# Patient Record
Sex: MALE | Race: BLACK OR AFRICAN AMERICAN | NOT HISPANIC OR LATINO | Employment: FULL TIME | URBAN - METROPOLITAN AREA
[De-identification: names, ages, dates, MRNs, and addresses within clinical notes are randomized per-mention and may not be internally consistent; named-entity substitution may affect disease eponyms.]

---

## 2019-03-11 ENCOUNTER — HOSPITAL ENCOUNTER (INPATIENT)
Facility: HOSPITAL | Age: 54
LOS: 3 days | Discharge: HOME | DRG: 065 | End: 2019-03-14
Attending: EMERGENCY MEDICINE | Admitting: EMERGENCY MEDICINE
Payer: COMMERCIAL

## 2019-03-11 DIAGNOSIS — R20.0 LEFT SIDED NUMBNESS: Primary | ICD-10-CM

## 2019-03-11 DIAGNOSIS — I63.9 STROKE: ICD-10-CM

## 2019-03-11 DIAGNOSIS — R53.1 WEAKNESS: ICD-10-CM

## 2019-03-11 DIAGNOSIS — I63.9 CVA (CEREBRAL VASCULAR ACCIDENT): ICD-10-CM

## 2019-03-11 DIAGNOSIS — R53.1 ACUTE LEFT-SIDED WEAKNESS: ICD-10-CM

## 2019-03-11 PROBLEM — E78.5 HYPERLIPIDEMIA: Status: ACTIVE | Noted: 2019-03-11

## 2019-03-11 LAB
ALBUMIN SERPL BCP-MCNC: 4.1 G/DL
ALP SERPL-CCNC: 128 U/L
ALT SERPL W/O P-5'-P-CCNC: 27 U/L
ANION GAP SERPL CALC-SCNC: 11 MMOL/L
AORTIC ROOT ANNULUS: 2.88 CM
AORTIC VALVE CUSP SEPERATION: 2.15 CM
APAP SERPL-MCNC: <3 UG/ML
ASCENDING AORTA: 2.88 CM
AST SERPL-CCNC: 18 U/L
AV INDEX (PROSTH): 0.93
AV MEAN GRADIENT: 3.34 MMHG
AV PEAK GRADIENT: 6.55 MMHG
AV VALVE AREA: 2.85 CM2
AV VELOCITY RATIO: 0.72
BASOPHILS # BLD AUTO: 0.05 K/UL
BASOPHILS NFR BLD: 0.4 %
BILIRUB SERPL-MCNC: 0.4 MG/DL
BSA FOR ECHO PROCEDURE: 2.24 M2
BUN SERPL-MCNC: 17 MG/DL
CALCIUM SERPL-MCNC: 10.1 MG/DL
CHLORIDE SERPL-SCNC: 104 MMOL/L
CHOLEST SERPL-MCNC: 195 MG/DL
CHOLEST/HDLC SERPL: 4.8 {RATIO}
CO2 SERPL-SCNC: 24 MMOL/L
CREAT SERPL-MCNC: 1.3 MG/DL
CV ECHO LV RWT: 0.59 CM
DIFFERENTIAL METHOD: NORMAL
DOP CALC AO PEAK VEL: 1.28 M/S
DOP CALC AO VTI: 20.61 CM
DOP CALC LVOT AREA: 3.08 CM2
DOP CALC LVOT DIAMETER: 1.98 CM
DOP CALC LVOT PEAK VEL: 0.92 M/S
DOP CALC LVOT STROKE VOLUME: 58.72 CM3
DOP CALCLVOT PEAK VEL VTI: 19.08 CM
E WAVE DECELERATION TIME: 251.79 MSEC
E/A RATIO: 0.85
E/E' RATIO: 6.63
ECHO LV POSTERIOR WALL: 1.25 CM (ref 0.6–1.1)
EOSINOPHIL # BLD AUTO: 0.1 K/UL
EOSINOPHIL NFR BLD: 1.1 %
ERYTHROCYTE [DISTWIDTH] IN BLOOD BY AUTOMATED COUNT: 13 %
EST. GFR  (AFRICAN AMERICAN): >60 ML/MIN/1.73 M^2
EST. GFR  (NON AFRICAN AMERICAN): >60 ML/MIN/1.73 M^2
ETHANOL SERPL-MCNC: <10 MG/DL
FRACTIONAL SHORTENING: 27 % (ref 28–44)
GLUCOSE SERPL-MCNC: 188 MG/DL
HCT VFR BLD AUTO: 43.3 %
HDLC SERPL-MCNC: 41 MG/DL
HDLC SERPL: 21 %
HGB BLD-MCNC: 14.2 G/DL
INR PPP: 1
INTERVENTRICULAR SEPTUM: 1.23 CM (ref 0.6–1.1)
IVRT: 0.12 MSEC
LA MAJOR: 4.82 CM
LA MINOR: 4.47 CM
LA WIDTH: 3.37 CM
LDLC SERPL CALC-MCNC: 117.4 MG/DL
LEFT ATRIUM SIZE: 4.1 CM
LEFT ATRIUM VOLUME INDEX: 24.8 ML/M2
LEFT ATRIUM VOLUME: 54.48 CM3
LEFT INTERNAL DIMENSION IN SYSTOLE: 3.13 CM (ref 2.1–4)
LEFT VENTRICLE DIASTOLIC VOLUME INDEX: 37.08 ML/M2
LEFT VENTRICLE DIASTOLIC VOLUME: 81.42 ML
LEFT VENTRICLE MASS INDEX: 87 G/M2
LEFT VENTRICLE SYSTOLIC VOLUME INDEX: 17.6 ML/M2
LEFT VENTRICLE SYSTOLIC VOLUME: 38.71 ML
LEFT VENTRICULAR INTERNAL DIMENSION IN DIASTOLE: 4.26 CM (ref 3.5–6)
LEFT VENTRICULAR MASS: 191.02 G
LV LATERAL E/E' RATIO: 4.82
LV SEPTAL E/E' RATIO: 10.6
LYMPHOCYTES # BLD AUTO: 3.9 K/UL
LYMPHOCYTES NFR BLD: 32.7 %
MCH RBC QN AUTO: 28 PG
MCHC RBC AUTO-ENTMCNC: 32.8 G/DL
MCV RBC AUTO: 85 FL
MONOCYTES # BLD AUTO: 0.8 K/UL
MONOCYTES NFR BLD: 7 %
MV PEAK A VEL: 0.62 M/S
MV PEAK E VEL: 0.53 M/S
NEUTROPHILS # BLD AUTO: 7 K/UL
NEUTROPHILS NFR BLD: 58.8 %
NONHDLC SERPL-MCNC: 154 MG/DL
PISA TR MAX VEL: 2.25 M/S
PLATELET # BLD AUTO: 331 K/UL
PMV BLD AUTO: 9.9 FL
POCT GLUCOSE: 202 MG/DL (ref 70–110)
POCT GLUCOSE: 94 MG/DL (ref 70–110)
POTASSIUM SERPL-SCNC: 3.8 MMOL/L
PROT SERPL-MCNC: 8 G/DL
PROTHROMBIN TIME: 10 SEC
PV PEAK VELOCITY: 0.96 CM/S
RA MAJOR: 4.93 CM
RA PRESSURE: 3 MMHG
RA WIDTH: 4.4 CM
RBC # BLD AUTO: 5.07 M/UL
RIGHT VENTRICULAR END-DIASTOLIC DIMENSION: 4.62 CM
RV TISSUE DOPPLER FREE WALL SYSTOLIC VELOCITY 1 (APICAL 4 CHAMBER VIEW): 11.33 M/S
SALICYLATES SERPL-MCNC: <5 MG/DL
SINUS: 2.94 CM
SODIUM SERPL-SCNC: 139 MMOL/L
STJ: 2.49 CM
TDI LATERAL: 0.11
TDI SEPTAL: 0.05
TDI: 0.08
TR MAX PG: 20.25 MMHG
TRICUSPID ANNULAR PLANE SYSTOLIC EXCURSION: 2.39 CM
TRIGL SERPL-MCNC: 183 MG/DL
TSH SERPL DL<=0.005 MIU/L-ACNC: 0.61 UIU/ML
TV REST PULMONARY ARTERY PRESSURE: 23 MMHG
WBC # BLD AUTO: 11.88 K/UL

## 2019-03-11 PROCEDURE — 84443 ASSAY THYROID STIM HORMONE: CPT

## 2019-03-11 PROCEDURE — 85610 PROTHROMBIN TIME: CPT

## 2019-03-11 PROCEDURE — 93010 ELECTROCARDIOGRAM REPORT: CPT | Mod: ,,, | Performed by: INTERNAL MEDICINE

## 2019-03-11 PROCEDURE — 80329 ANALGESICS NON-OPIOID 1 OR 2: CPT

## 2019-03-11 PROCEDURE — 25000003 PHARM REV CODE 250: Performed by: PHYSICIAN ASSISTANT

## 2019-03-11 PROCEDURE — 80307 DRUG TEST PRSMV CHEM ANLYZR: CPT

## 2019-03-11 PROCEDURE — 80320 DRUG SCREEN QUANTALCOHOLS: CPT

## 2019-03-11 PROCEDURE — 82962 GLUCOSE BLOOD TEST: CPT

## 2019-03-11 PROCEDURE — 85025 COMPLETE CBC W/AUTO DIFF WBC: CPT

## 2019-03-11 PROCEDURE — 80061 LIPID PANEL: CPT

## 2019-03-11 PROCEDURE — 99204 OFFICE O/P NEW MOD 45 MIN: CPT | Mod: GT,,, | Performed by: PSYCHIATRY & NEUROLOGY

## 2019-03-11 PROCEDURE — 93010 EKG 12-LEAD: ICD-10-PCS | Mod: ,,, | Performed by: INTERNAL MEDICINE

## 2019-03-11 PROCEDURE — 25000003 PHARM REV CODE 250: Performed by: EMERGENCY MEDICINE

## 2019-03-11 PROCEDURE — 99204 PR OFFICE/OUTPT VISIT, NEW, LEVL IV, 45-59 MIN: ICD-10-PCS | Mod: GT,,, | Performed by: PSYCHIATRY & NEUROLOGY

## 2019-03-11 PROCEDURE — 80053 COMPREHEN METABOLIC PANEL: CPT

## 2019-03-11 PROCEDURE — 11000001 HC ACUTE MED/SURG PRIVATE ROOM

## 2019-03-11 PROCEDURE — 99285 EMERGENCY DEPT VISIT HI MDM: CPT | Mod: 25

## 2019-03-11 PROCEDURE — 83036 HEMOGLOBIN GLYCOSYLATED A1C: CPT

## 2019-03-11 PROCEDURE — 93005 ELECTROCARDIOGRAM TRACING: CPT

## 2019-03-11 RX ORDER — SODIUM CHLORIDE 9 MG/ML
INJECTION, SOLUTION INTRAVENOUS CONTINUOUS
Status: DISCONTINUED | OUTPATIENT
Start: 2019-03-11 | End: 2019-03-13

## 2019-03-11 RX ORDER — ASPIRIN 600 MG/1
300 SUPPOSITORY RECTAL
Status: COMPLETED | OUTPATIENT
Start: 2019-03-11 | End: 2019-03-11

## 2019-03-11 RX ORDER — SODIUM CHLORIDE, SODIUM LACTATE, POTASSIUM CHLORIDE, CALCIUM CHLORIDE 600; 310; 30; 20 MG/100ML; MG/100ML; MG/100ML; MG/100ML
INJECTION, SOLUTION INTRAVENOUS CONTINUOUS
Status: DISCONTINUED | OUTPATIENT
Start: 2019-03-11 | End: 2019-03-11

## 2019-03-11 RX ORDER — ASPIRIN 300 MG/1
300 SUPPOSITORY RECTAL DAILY
Status: DISCONTINUED | OUTPATIENT
Start: 2019-03-12 | End: 2019-03-12

## 2019-03-11 RX ORDER — HYDRALAZINE HYDROCHLORIDE 20 MG/ML
10 INJECTION INTRAMUSCULAR; INTRAVENOUS EVERY 8 HOURS PRN
Status: DISCONTINUED | OUTPATIENT
Start: 2019-03-11 | End: 2019-03-14

## 2019-03-11 RX ORDER — NAPROXEN SODIUM 220 MG/1
81 TABLET, FILM COATED ORAL DAILY
Status: DISCONTINUED | OUTPATIENT
Start: 2019-03-12 | End: 2019-03-11

## 2019-03-11 RX ORDER — GLUCAGON 1 MG
1 KIT INJECTION
Status: DISCONTINUED | OUTPATIENT
Start: 2019-03-11 | End: 2019-03-14 | Stop reason: HOSPADM

## 2019-03-11 RX ORDER — ATORVASTATIN CALCIUM 10 MG/1
40 TABLET, FILM COATED ORAL DAILY
Status: DISCONTINUED | OUTPATIENT
Start: 2019-03-12 | End: 2019-03-11

## 2019-03-11 RX ORDER — INSULIN ASPART 100 [IU]/ML
1-10 INJECTION, SOLUTION INTRAVENOUS; SUBCUTANEOUS EVERY 6 HOURS PRN
Status: DISCONTINUED | OUTPATIENT
Start: 2019-03-11 | End: 2019-03-14 | Stop reason: HOSPADM

## 2019-03-11 RX ORDER — ASPIRIN 300 MG/1
300 SUPPOSITORY RECTAL DAILY
Status: DISCONTINUED | OUTPATIENT
Start: 2019-03-12 | End: 2019-03-11

## 2019-03-11 RX ORDER — SODIUM CHLORIDE 0.9 % (FLUSH) 0.9 %
5 SYRINGE (ML) INJECTION
Status: DISCONTINUED | OUTPATIENT
Start: 2019-03-11 | End: 2019-03-14 | Stop reason: HOSPADM

## 2019-03-11 RX ADMIN — ASPIRIN 300 MG: 600 SUPPOSITORY RECTAL at 01:03

## 2019-03-11 RX ADMIN — SODIUM CHLORIDE: 0.9 INJECTION, SOLUTION INTRAVENOUS at 07:03

## 2019-03-11 NOTE — ASSESSMENT & PLAN NOTE
Acute onset left sided weakness and numbness today at 10:30am today. He also reports numbness of left arm, however on exam he has tenderness of left bicep tendon and pain with flexion of left shoulder. CT head without acute abnormality.   MRI   Echo  Neurology consulted  PT/OT/SLP  Neuro checks, aspiration precautions  Will provide IVF as patient failed nursing swallow screen downstairs. Repeat pending.

## 2019-03-11 NOTE — ED PROVIDER NOTES
Encounter Date: 3/11/2019  This is a SORT/MSE of a 53 y.o. male with HIV, DM presenting to the ED with c/o left sided numbness and weakness x 1 hour.  Code STROKE called. Care will be transferred to an alternate provider when patient is roomed for a full evaluation and final disposition. Patient is aware that he/she is awaiting a room in the emergency department, where another provider will review results, evaluate and treat as needed. VIMAL Sifuentes DNP  SCRIBE #1 NOTE: IRob, am scribing for, and in the presence of,  Panfilo Johns MD. I have scribed the following portions of the note - Other sections scribed: HPI, ROS, PE .       History     Chief Complaint   Patient presents with    Numbness     Pt reports numbness and weakness to left side one hour ago while driving.      CC: Numbness    53 y.o. Male with a past medical history of Diabetes mellitus, HIV, and Hypertension presents to ED for emergent evaluation of acute onset left sided numbness and weakness x 1 hour. Pt reports experiencing symptoms while driving. He felt fine this morning. Pt's CD4 count is >500 and viral load is undetectable. No other associated symptoms.       The history is provided by the patient. No  was used.     Review of patient's allergies indicates:  No Known Allergies  Past Medical History:   Diagnosis Date    Diabetes mellitus     HIV (human immunodeficiency virus infection)     Hypertension      No past surgical history on file.  No family history on file.  Social History     Tobacco Use    Smoking status: Never Smoker   Substance Use Topics    Alcohol use: No    Drug use: No     Review of Systems   Constitutional: Negative for appetite change and fever.   HENT: Negative for rhinorrhea and sore throat.    Eyes: Negative for visual disturbance.   Respiratory: Negative for cough and shortness of breath.    Cardiovascular: Negative for chest pain.   Gastrointestinal: Negative for abdominal pain.    Genitourinary: Negative for dysuria.   Musculoskeletal: Negative for gait problem.   Skin: Negative for rash.   Neurological: Positive for weakness (left sided) and numbness (left sided). Negative for syncope.       Physical Exam     Initial Vitals [03/11/19 1156]   BP Pulse Resp Temp SpO2   (!) 203/101 88 18 98.2 °F (36.8 °C) 99 %      MAP       --         Physical Exam    Nursing note and vitals reviewed.  Constitutional: He is not diaphoretic. No distress.   HENT:   Head: Normocephalic and atraumatic.   Mouth/Throat: Oropharynx is clear and moist.   Eyes: Conjunctivae and EOM are normal. Pupils are equal, round, and reactive to light. No scleral icterus.   Intermittently lifts left eyebrow, while the other one won't move  No facial droop  Visual field intact    Neck: Normal range of motion. Neck supple. No JVD present.   Cardiovascular: Normal rate, regular rhythm and intact distal pulses.   Pulmonary/Chest: Breath sounds normal. No stridor. No respiratory distress.   Abdominal: Soft. Bowel sounds are normal. He exhibits no distension. There is no tenderness.   Musculoskeletal: Normal range of motion. He exhibits no edema or tenderness.   Neurological: He is alert. He has normal strength. No cranial nerve deficit.   Mild left sided weakness  Decreased left side sensation    Skin: Skin is warm and dry. No rash noted.         ED Course   Procedures  Labs Reviewed   COMPREHENSIVE METABOLIC PANEL - Abnormal; Notable for the following components:       Result Value    Glucose 188 (*)     All other components within normal limits   LIPID PANEL - Abnormal; Notable for the following components:    Triglycerides 183 (*)     All other components within normal limits   POCT GLUCOSE - Abnormal; Notable for the following components:    POCT Glucose 202 (*)     All other components within normal limits   CBC W/ AUTO DIFFERENTIAL   PROTIME-INR   TSH   ALCOHOL,MEDICAL (ETHANOL)   SALICYLATE LEVEL   ACETAMINOPHEN LEVEL   DRUG  SCREEN PANEL, URINE EMERGENCY   POCT GLUCOSE, HAND-HELD DEVICE     EKG Readings: (Independently Interpreted)   EKG done at 12:08 p.m. showed normal sinus rhythm with a rate 88.  No ST elevation or T-wave abnormality.  Normal axis and QRS is normal EKG.       Imaging Results          X-Ray Chest AP Portable (Final result)  Result time 03/11/19 13:03:33    Final result by Elias Handley MD (03/11/19 13:03:33)                 Impression:      1. No acute cardiopulmonary process, hypoventilatory exam.      Electronically signed by: Elias Handley MD  Date:    03/11/2019  Time:    13:03             Narrative:    EXAMINATION:  XR CHEST AP PORTABLE    CLINICAL HISTORY:  Stroke;    TECHNIQUE:  Single frontal view of the chest was performed.    COMPARISON:  04/08/2013    FINDINGS:  The cardiomediastinal silhouette is not enlarged, magnified by technique..  There is no pleural effusion.  The trachea is midline.  The lungs are symmetrically expanded bilaterally without evidence of acute parenchymal process. No large focal consolidation seen.  There is no pneumothorax.  The osseous structures are remarkable for degenerative changes..                               CT Head Without Contrast (Final result)  Result time 03/11/19 12:19:19    Final result by Zach Ackerman MD (03/11/19 12:19:19)                 Impression:      No acute intracranial abnormalities are identified.  No evidence for intracranial hemorrhage.      Electronically signed by: Zach Ackerman MD  Date:    03/11/2019  Time:    12:19             Narrative:    EXAMINATION:  CT HEAD WITHOUT CONTRAST    CLINICAL HISTORY:  Stroke;left sided weakness;    TECHNIQUE:  Low dose axial images were obtained through the head.  Coronal and sagittal reformations were also performed. Contrast was not administered.    COMPARISON:  None.    FINDINGS:  The ventricles are normal in size and configuration.  There is normal gray-white matter differentiation maintained.  There is  no evidence for abnormal masses, mass effect, or midline shift.  No abnormal intra or extra-axial fluid collections are identified, specifically there is no evidence for intracranial hemorrhage.  Mastoid air cells and visualized paranasal sinuses are clear.  Bony calvarium is intact.                                 Medical Decision Making:   Initial Assessment:   83-year-old male coming in secondary concern for acute stroke.  Having left-sided weakness and numbness.  The weakness has improved between my and the neurologist exam but he is stating increased sensation.  Patient has some with delay in answeringQuestions but can fully enunciate words.  Additionally he is sometimes lifting his eyebrows up bilaterally and then sometimes only and laterally.  This exam is inconsistent whenever you asking him to do different things.  Unsure if this was acute stroke and/or psychogenic.  Neurology evaluated the patient and stated that he is not a tPA candidate.  Given the patient aspirin after his CT head came back normal. Chest x-ray is reassuring.  Patient stroke labs.  Due to the inconsistency of exam I added on blood tox and urine tox on the patient.  Neurology recommended admission and MRI and observation.  I spoke with Kurt and the patient was admitted.  I did consider tPA in this patient due to the fact he was within the time zone of administrating tPA.  Blood pressure came down 132/82 without intervention.    Please put in 35 minutes of critical care due to patient having a high risk of neurological failure.   Separate from teaching and exclusive of procedure and ekg time  Includes:  Time at bedside  Time reviewing test results  Time discussing case with staff  Time documenting the medical record  Time spent with family members  Time spent with consults  Management    Clinical Tests:   Lab Tests: Ordered and Reviewed  Radiological Study: Reviewed and Ordered  Medical Tests: Ordered and Reviewed            Scribe  Attestation:   Scribe #1: I performed the above scribed service and the documentation accurately describes the services I performed. I attest to the accuracy of the note.    Attending Attestation:           Physician Attestation for Scribe:  Physician Attestation Statement for Scribe #1: I, Panfilo Johns MD, reviewed documentation, as scribed by Rob Funes in my presence, and it is both accurate and complete.                    Clinical Impression:       ICD-10-CM ICD-9-CM   1. Left sided numbness R20.0 782.0   2. Stroke I63.9 434.91                                Panfilo Johns MD  03/11/19 [id number]

## 2019-03-11 NOTE — ASSESSMENT & PLAN NOTE
Will continue home intelence, prerzista, and norvir when tolerating PO. Failed swallow screen in the ED.

## 2019-03-11 NOTE — ASSESSMENT & PLAN NOTE
Hold home oral hypoglycemics. Sliding scale insulin. Hypoglycemic protocol. POCT glucose checks. Patient failed swallow study in ED.

## 2019-03-11 NOTE — SUBJECTIVE & OBJECTIVE
"  Woke up with symptoms?: no  Last known normal: Last Known Normal Date: 03/11/19 Last Known Normal Time: 1045    Recent bleeding noted: no  Does the patient take any Blood Thinners? yes  Medications: No relevant medications      Past Medical History: hypertension, diabetes and HIV    Past Surgical History: no major surgeries within the last 2 weeks    Family History: no relevant history    Social History: no smoking, no drinking, no drugs    Allergies: No Known Allergies     Review of Systems   Neurological: Positive for weakness and numbness.   All other systems reviewed and are negative.    Objective:   Vitals: Blood pressure (!) 159/73, pulse 83, temperature 98.2 °F (36.8 °C), temperature source Oral, resp. rate 19, height 5' 11" (1.803 m), weight 99.8 kg (220 lb), SpO2 98 %.     CT READ: Yes  No hemmorhage. No mass effect. No early infarct signs.     Physical Exam   Constitutional: He is oriented to person, place, and time. He appears well-developed and well-nourished.   HENT:   Head: Normocephalic and atraumatic.   Eyes: EOM are normal. Pupils are equal, round, and reactive to light.   Cardiovascular: Normal rate and regular rhythm.   Pulmonary/Chest: Effort normal.   Neurological: He is alert and oriented to person, place, and time. A sensory deficit is present.   Vitals reviewed.        "

## 2019-03-11 NOTE — HOSPITAL COURSE
Lore Shrestha 53 y.o. male placed in observation for left sided weakness. In the ED, vascular neuro consulted and recommended admission for MRI, CT head without acute abnormality, chest x-ray without acute process, and EKG of NSR. MRI show,Small focus of acute infarction in the right thalamus.  No evidence of hemorrhagic conversion.PT,OT,ST was consulted,neurology as well.  He was on ASA,statin. carotid doppler,echo,MRA brain,unemarkable.  Was on permissive HTN.started gradually on BP med;s,still has some left face and arm numbness,he was monitored with improvement.  He did well with PT,OT and ST and out patient PT,OT recommended,  He has been discharged home with increased BP med's,ASA, and follow up with PCP in next few days.

## 2019-03-11 NOTE — SUBJECTIVE & OBJECTIVE
Past Medical History:   Diagnosis Date    Diabetes mellitus     HIV (human immunodeficiency virus infection)     Hypertension        No past surgical history on file.    Review of patient's allergies indicates:  No Known Allergies    No current facility-administered medications on file prior to encounter.      Current Outpatient Medications on File Prior to Encounter   Medication Sig    aspirin 81 MG Chew Take 1 tablet (81 mg total) by mouth once daily.    atorvastatin (LIPITOR) 40 MG tablet Take 40 mg by mouth once daily.    etravirine (INTELENCE) 100 mg Tab Take 200 mg by mouth 2 (two) times daily with meals.    hydrochlorothiazide (HYDRODIURIL) 25 MG tablet Take 1 tablet (25 mg total) by mouth once daily.    insulin lispro protamine-insulin lispro (HUMALOG 75/25) 100 unit/mL (75-25) Susp Inject 100 Units into the skin 2 (two) times daily before meals.    lisinopril (PRINIVIL,ZESTRIL) 20 MG tablet Take 20 mg by mouth once daily.    metformin (GLUCOPHAGE) 500 MG tablet Take 500 mg by mouth 2 (two) times daily with meals.    amitriptyline (ELAVIL) 25 MG tablet Take 25 mg by mouth nightly as needed.    darunavir (PREZISTA) 400 MG Tab Take by mouth.    ibuprofen (ADVIL,MOTRIN) 600 MG tablet Take 1 tablet (600 mg total) by mouth 3 (three) times daily.    ritonavir (NORVIR) 100 mg Cap Take 600 mg by mouth 2 (two) times daily.    tenofovir (VIREAD) 300 mg Tab Take 300 mg by mouth once daily.     Family History     None        Tobacco Use    Smoking status: Never Smoker   Substance and Sexual Activity    Alcohol use: No    Drug use: No    Sexual activity: Not on file     Review of Systems   Constitutional: Negative for chills and fever.   HENT: Negative for nosebleeds and tinnitus.    Eyes: Negative for photophobia and visual disturbance.   Respiratory: Negative for shortness of breath and wheezing.    Cardiovascular: Negative for chest pain, palpitations and leg swelling.   Gastrointestinal: Negative  for abdominal distention, nausea and vomiting.   Genitourinary: Negative for dysuria, flank pain and hematuria.   Musculoskeletal: Positive for arthralgias (left shoulder). Negative for gait problem and joint swelling.   Skin: Negative for rash and wound.   Neurological: Positive for weakness and numbness. Negative for seizures, syncope and facial asymmetry.     Objective:     Vital Signs (Most Recent):  Temp: 98.4 °F (36.9 °C) (03/11/19 1357)  Pulse: 79 (03/11/19 1432)  Resp: (!) 21 (03/11/19 1329)  BP: (!) 170/96 (03/11/19 1432)  SpO2: 97 % (03/11/19 1432) Vital Signs (24h Range):  Temp:  [98.2 °F (36.8 °C)-98.4 °F (36.9 °C)] 98.4 °F (36.9 °C)  Pulse:  [78-88] 79  Resp:  [18-21] 21  SpO2:  [96 %-99 %] 97 %  BP: (132-203)/() 170/96     Weight: 99.8 kg (220 lb)  Body mass index is 30.68 kg/m².    Physical Exam   Constitutional: He is oriented to person, place, and time. He appears well-developed and well-nourished. No distress.   HENT:   Head: Normocephalic and atraumatic.   Right Ear: External ear normal.   Left Ear: External ear normal.   Eyes: Conjunctivae and EOM are normal. Right eye exhibits no discharge. Left eye exhibits no discharge.   Neck: Normal range of motion. No thyromegaly present.   Cardiovascular: Normal rate and regular rhythm.   No murmur heard.  Pulmonary/Chest: Effort normal and breath sounds normal. No respiratory distress.   Abdominal: Soft. Bowel sounds are normal. He exhibits no distension and no mass. There is no tenderness.   Musculoskeletal: He exhibits tenderness. He exhibits no edema or deformity.   At left bicep extending into shoulder. Patient reports pain with left shoulder flexion.   Neurological: He is alert and oriented to person, place, and time. A sensory deficit (reports decreased sensation to left side) is present. He exhibits normal muscle tone.   5/5 strength on RUE with finger strength and elbow flexion. LUE 4/5 strength with  strength, elbow flexion and shoulder  abduction. Pain with shoulder flexion. Finger to nose intact bilaterally, heel to shin intact bilaterally, thumb to finger intact bilaterally, negative romberg.   Intermittently raises both eyebrows, able to furrow both eyebrows.    Skin: Skin is warm and dry.   Psychiatric: He has a normal mood and affect. His behavior is normal.   Nursing note and vitals reviewed.        CRANIAL NERVES     CN III, IV, VI   Extraocular motions are normal.        Significant Labs:   CBC:   Recent Labs   Lab 03/11/19  1210   WBC 11.88   HGB 14.2   HCT 43.3        CMP:   Recent Labs   Lab 03/11/19  1210      K 3.8      CO2 24   *   BUN 17   CREATININE 1.3   CALCIUM 10.1   PROT 8.0   ALBUMIN 4.1   BILITOT 0.4   ALKPHOS 128   AST 18   ALT 27   ANIONGAP 11   EGFRNONAA >60     Lipid Panel:   Recent Labs   Lab 03/11/19  1210   CHOL 195   HDL 41   LDLCALC 117.4   TRIG 183*   CHOLHDL 21.0     TSH:   Recent Labs   Lab 03/11/19  1210   TSH 0.611       Significant Imaging:   Imaging Results          X-Ray Chest AP Portable (Final result)  Result time 03/11/19 13:03:33    Final result by Elias Handley MD (03/11/19 13:03:33)                 Impression:      1. No acute cardiopulmonary process, hypoventilatory exam.      Electronically signed by: Elias Handley MD  Date:    03/11/2019  Time:    13:03             Narrative:    EXAMINATION:  XR CHEST AP PORTABLE    CLINICAL HISTORY:  Stroke;    TECHNIQUE:  Single frontal view of the chest was performed.    COMPARISON:  04/08/2013    FINDINGS:  The cardiomediastinal silhouette is not enlarged, magnified by technique..  There is no pleural effusion.  The trachea is midline.  The lungs are symmetrically expanded bilaterally without evidence of acute parenchymal process. No large focal consolidation seen.  There is no pneumothorax.  The osseous structures are remarkable for degenerative changes..                               CT Head Without Contrast (Final result)   Result time 03/11/19 12:19:19    Final result by Zach Ackerman MD (03/11/19 12:19:19)                 Impression:      No acute intracranial abnormalities are identified.  No evidence for intracranial hemorrhage.      Electronically signed by: Zach Ackerman MD  Date:    03/11/2019  Time:    12:19             Narrative:    EXAMINATION:  CT HEAD WITHOUT CONTRAST    CLINICAL HISTORY:  Stroke;left sided weakness;    TECHNIQUE:  Low dose axial images were obtained through the head.  Coronal and sagittal reformations were also performed. Contrast was not administered.    COMPARISON:  None.    FINDINGS:  The ventricles are normal in size and configuration.  There is normal gray-white matter differentiation maintained.  There is no evidence for abnormal masses, mass effect, or midline shift.  No abnormal intra or extra-axial fluid collections are identified, specifically there is no evidence for intracranial hemorrhage.  Mastoid air cells and visualized paranasal sinuses are clear.  Bony calvarium is intact.

## 2019-03-11 NOTE — ASSESSMENT & PLAN NOTE
Acute left-sided weakness does not appear to be related to stroke but he does have significant risk factors thus he deserves a MRI of brain

## 2019-03-11 NOTE — H&P
[facility] Medicine  History & Physical    Patient Name: Lore Shrestha  MRN: [id number]  Admission Date: 3/11/2019  Attending Physician: Nupur Truong MD   Primary Care Provider: Helen Spencer NP         Patient information was obtained from patient, past medical records and ER records.     Subjective:     Principal Problem:Acute left-sided weakness    Chief Complaint:   Chief Complaint   Patient presents with    Numbness     Pt reports numbness and weakness to left side one hour ago while driving.         HPI: Lore Shrestha 53 y.o. male with HTN, HLD, HIV, and DM2 presents to the hospital with a chief complaint of left sided weakness and numbness which began suddenly at 10:30am. He reports the numbness on his left side has been constant since that time and is still present now. He denies any recent head trauma or falls. He also reports he feels as though his left arm is weaker. He denies any slurred speech, difficulty swallowing, or facial droop. His sister reports she felt as though he was slurring his speech this morning. He denies any weakness or altered sensation to his lower extremities. His last CD4 count was greater than 500 and viral load undetectable he follows with [facility] for treatment. He has juana receiving treatment with orthopedics for left shoulder tendinitis. He reports his left shoulder feels as though it is stiff. He reports on 3/6 he received an injection in his left shoulder. He endorses left sided weakness and numbness. He denies fever, chest pain, SOB, N/V/D, abdominal pain, dysuria, syncope, and head trauma.     In the ED, vascular neuro consulted and recommended admission for MRI, CT head without acute abnormality, chest x-ray without acute process, and EKG of NSR.     Past Medical History:   Diagnosis Date    Diabetes mellitus     HIV (human immunodeficiency virus infection)     Hypertension        No past surgical history on file.    Review of  patient's allergies indicates:  No Known Allergies    No current facility-administered medications on file prior to encounter.      Current Outpatient Medications on File Prior to Encounter   Medication Sig    aspirin 81 MG Chew Take 1 tablet (81 mg total) by mouth once daily.    atorvastatin (LIPITOR) 40 MG tablet Take 40 mg by mouth once daily.    etravirine (INTELENCE) 100 mg Tab Take 200 mg by mouth 2 (two) times daily with meals.    hydrochlorothiazide (HYDRODIURIL) 25 MG tablet Take 1 tablet (25 mg total) by mouth once daily.    insulin lispro protamine-insulin lispro (HUMALOG 75/25) 100 unit/mL (75-25) Susp Inject 100 Units into the skin 2 (two) times daily before meals.    lisinopril (PRINIVIL,ZESTRIL) 20 MG tablet Take 20 mg by mouth once daily.    metformin (GLUCOPHAGE) 500 MG tablet Take 500 mg by mouth 2 (two) times daily with meals.    amitriptyline (ELAVIL) 25 MG tablet Take 25 mg by mouth nightly as needed.    darunavir (PREZISTA) 400 MG Tab Take by mouth.    ibuprofen (ADVIL,MOTRIN) 600 MG tablet Take 1 tablet (600 mg total) by mouth 3 (three) times daily.    ritonavir (NORVIR) 100 mg Cap Take 600 mg by mouth 2 (two) times daily.    tenofovir (VIREAD) 300 mg Tab Take 300 mg by mouth once daily.     Family History     None        Tobacco Use    Smoking status: Never Smoker   Substance and Sexual Activity    Alcohol use: No    Drug use: No    Sexual activity: Not on file     Review of Systems   Constitutional: Negative for chills and fever.   HENT: Negative for nosebleeds and tinnitus.    Eyes: Negative for photophobia and visual disturbance.   Respiratory: Negative for shortness of breath and wheezing.    Cardiovascular: Negative for chest pain, palpitations and leg swelling.   Gastrointestinal: Negative for abdominal distention, nausea and vomiting.   Genitourinary: Negative for dysuria, flank pain and hematuria.   Musculoskeletal: Positive for arthralgias (left shoulder). Negative  for gait problem and joint swelling.   Skin: Negative for rash and wound.   Neurological: Positive for weakness and numbness. Negative for seizures, syncope and facial asymmetry.     Objective:     Vital Signs (Most Recent):  Temp: 98.4 °F (36.9 °C) (03/11/19 1357)  Pulse: 79 (03/11/19 1432)  Resp: (!) 21 (03/11/19 1329)  BP: (!) 170/96 (03/11/19 1432)  SpO2: 97 % (03/11/19 1432) Vital Signs (24h Range):  Temp:  [98.2 °F (36.8 °C)-98.4 °F (36.9 °C)] 98.4 °F (36.9 °C)  Pulse:  [78-88] 79  Resp:  [18-21] 21  SpO2:  [96 %-99 %] 97 %  BP: (132-203)/() 170/96     Weight: 99.8 kg (220 lb)  Body mass index is 30.68 kg/m².    Physical Exam   Constitutional: He is oriented to person, place, and time. He appears well-developed and well-nourished. No distress.   HENT:   Head: Normocephalic and atraumatic.   Right Ear: External ear normal.   Left Ear: External ear normal.   Eyes: Conjunctivae and EOM are normal. Right eye exhibits no discharge. Left eye exhibits no discharge.   Neck: Normal range of motion. No thyromegaly present.   Cardiovascular: Normal rate and regular rhythm.   No murmur heard.  Pulmonary/Chest: Effort normal and breath sounds normal. No respiratory distress.   Abdominal: Soft. Bowel sounds are normal. He exhibits no distension and no mass. There is no tenderness.   Musculoskeletal: He exhibits tenderness. He exhibits no edema or deformity.   At left bicep extending into shoulder. Patient reports pain with left shoulder flexion.   Neurological: He is alert and oriented to person, place, and time. A sensory deficit (reports decreased sensation to left side) is present. He exhibits normal muscle tone.   5/5 strength on RUE with finger strength and elbow flexion. LUE 4/5 strength with  strength, elbow flexion and shoulder abduction. Pain with shoulder flexion. Finger to nose intact bilaterally, heel to shin intact bilaterally, thumb to finger intact bilaterally, negative romberg.   Intermittently  raises both eyebrows, able to furrow both eyebrows.    Skin: Skin is warm and dry.   Psychiatric: He has a normal mood and affect. His behavior is normal.   Nursing note and vitals reviewed.        CRANIAL NERVES     CN III, IV, VI   Extraocular motions are normal.        Significant Labs:   CBC:   Recent Labs   Lab 03/11/19  1210   WBC 11.88   HGB 14.2   HCT 43.3        CMP:   Recent Labs   Lab 03/11/19  1210      K 3.8      CO2 24   *   BUN 17   CREATININE 1.3   CALCIUM 10.1   PROT 8.0   ALBUMIN 4.1   BILITOT 0.4   ALKPHOS 128   AST 18   ALT 27   ANIONGAP 11   EGFRNONAA >60     Lipid Panel:   Recent Labs   Lab 03/11/19  1210   CHOL 195   HDL 41   LDLCALC 117.4   TRIG 183*   CHOLHDL 21.0     TSH:   Recent Labs   Lab 03/11/19  1210   TSH 0.611       Significant Imaging:   Imaging Results          X-Ray Chest AP Portable (Final result)  Result time 03/11/19 13:03:33    Final result by Elias Handley MD (03/11/19 13:03:33)                 Impression:      1. No acute cardiopulmonary process, hypoventilatory exam.      Electronically signed by: Elias Handley MD  Date:    03/11/2019  Time:    13:03             Narrative:    EXAMINATION:  XR CHEST AP PORTABLE    CLINICAL HISTORY:  Stroke;    TECHNIQUE:  Single frontal view of the chest was performed.    COMPARISON:  04/08/2013    FINDINGS:  The cardiomediastinal silhouette is not enlarged, magnified by technique..  There is no pleural effusion.  The trachea is midline.  The lungs are symmetrically expanded bilaterally without evidence of acute parenchymal process. No large focal consolidation seen.  There is no pneumothorax.  The osseous structures are remarkable for degenerative changes..                               CT Head Without Contrast (Final result)  Result time 03/11/19 12:19:19    Final result by Zach Ackerman MD (03/11/19 12:19:19)                 Impression:      No acute intracranial abnormalities are identified.  No  evidence for intracranial hemorrhage.      Electronically signed by: Zach Ackerman MD  Date:    03/11/2019  Time:    12:19             Narrative:    EXAMINATION:  CT HEAD WITHOUT CONTRAST    CLINICAL HISTORY:  Stroke;left sided weakness;    TECHNIQUE:  Low dose axial images were obtained through the head.  Coronal and sagittal reformations were also performed. Contrast was not administered.    COMPARISON:  None.    FINDINGS:  The ventricles are normal in size and configuration.  There is normal gray-white matter differentiation maintained.  There is no evidence for abnormal masses, mass effect, or midline shift.  No abnormal intra or extra-axial fluid collections are identified, specifically there is no evidence for intracranial hemorrhage.  Mastoid air cells and visualized paranasal sinuses are clear.  Bony calvarium is intact.                                  Assessment/Plan:     * CVA (cerebral vascular accident)    Acute onset left sided weakness and numbness today at 10:30am today. He also reports numbness of left arm, however on exam he has tenderness of left bicep tendon and pain with flexion of left shoulder. CT head without acute abnormality.   MRI   Echo  Neurology consulted  PT/OT/SLP  Neuro checks, aspiration precautions  Will provide IVF as patient failed nursing swallow screen downstairs. Repeat pending.     Hyperlipidemia    Will continue home statin when able to tolerate PO.       Left sided numbness    See above       Acute left-sided weakness    Due to CVA     HTN (hypertension)    Poorly controlled. Will hold home medications as patient failed swallow study down stairs and until MRI for stroke rule out. Allow permissive HTN to 220, patient reports ongoing numbness of left arm. PRN hydralazine for above 220.      Diabetes mellitus type II, uncontrolled    Hold home oral hypoglycemics. Sliding scale insulin. Hypoglycemic protocol. POCT glucose checks. Patient failed swallow study in ED.      HIV  (human immunodeficiency virus infection)    Will continue home intelence, prerzista, and norvir when tolerating PO. Failed swallow screen in the ED.        VTE Risk Mitigation (From admission, onward)        Ordered     IP VTE HIGH RISK PATIENT  Once      03/11/19 1519     Place sequential compression device  Until discontinued      03/11/19 1519     Place JOSE E hose  Until discontinued      03/11/19 1519        VTE: JOSE E/SCD  Code: full  Diet: NPO as failed swallow study. Holding home PO medications until able to swallow  Dispo: pending MRI and neuro recs       ADDENDUM 1906:  MRI with signs of acute CVA on thalamus. Continued symptoms of numbness. Will allow permissive HTN. Patient has not passed swallow assessment and remains NPO. Neurology consulted. Echo in the morning. PT/OT/SLP consulted.   Neuro checks and aspiration precautions.     Kurt Eugene PA-C  Department of Hospital Medicine   [facility]

## 2019-03-11 NOTE — ASSESSMENT & PLAN NOTE
Poorly controlled. Will hold home medications as patient failed swallow study down stairs and until MRI for stroke rule out. Allow permissive HTN to 220, patient reports ongoing numbness of left arm. PRN hydralazine for above 220.

## 2019-03-11 NOTE — HPI
Lore Shrestha 53 y.o. male with HTN, HLD, HIV, and DM2 presents to the hospital with a chief complaint of left sided weakness and numbness which began suddenly at 10:30am. He reports the numbness on his left side has been constant since that time and is still present now. He denies any recent head trauma or falls. He also reports he feels as though his left arm is weaker. He denies any slurred speech, difficulty swallowing, or facial droop. His sister reports she felt as though he was slurring his speech this morning. He denies any weakness or altered sensation to his lower extremities. His last CD4 count was greater than 500 and viral load undetectable he follows with [facility] for treatment. He has juana receiving treatment with orthopedics for left shoulder tendinitis. He reports his left shoulder feels as though it is stiff. He reports on 3/6 he received an injection in his left shoulder. He endorses left sided weakness and numbness. He denies fever, chest pain, SOB, N/V/D, abdominal pain, dysuria, syncope, and head trauma.     In the ED, vascular neuro consulted and recommended admission for MRI, CT head without acute abnormality, chest x-ray without acute process, and EKG of NSR.

## 2019-03-11 NOTE — CONSULTS
[facility]  Vascular Neurology  Comprehensive Stroke Center  Tele-Consultation Note      Inpatient consult to Telemedicine-Stroke  Consult performed by: Lisa Mendez MD  Consult ordered by: Lucia Sifuentes NP  Reason for consult: stroke          Consulting Provider: Елена Physician:: Panfilo Johns  Current Providers  No providers found    Patient Location: [facility]  Spoke hospital nurse at bedside with patient assisting consultant.     Patient information was obtained from patient.       Assessment/Plan:     STROKE DOCUMENTATION     Acute Stroke Times:   Acute Stroke Times   Last Known Normal Date: 03/11/19  Last Known Normal Time: 1045  Symptom Onset Date: 03/11/19  Symptom Onset Time: 1045  Stroke Team Called Date: 03/11/19  Stroke Team Called Time: 1203  Stroke Team Arrival Date: 03/11/19  Stroke Team Arrival Time: 1211  CT Interpretation Time: 1211    NIH Scale:  Interval: baseline  1a. Level of Consciousness: 0-->Alert, keenly responsive  1b. LOC Questions: 0-->Answers both questions correctly  1c. LOC Commands: 0-->Performs both tasks correctly  2. Best Gaze: 0-->Normal  3. Visual: 0-->No visual loss  4. Facial Palsy: 0-->Normal symmetrical movements  5a. Motor Arm, Left: 0-->No drift, limb holds 90 (or 45) degrees for full 10 secs  5b. Motor Arm, Right: 0-->No drift, limb holds 90 (or 45) degrees for full 10 secs  6a. Motor Leg, Left: 0-->No drift, leg holds 30 degree position for full 5 secs  6b. Motor Leg, Right: 0-->No drift, leg holds 30 degree position for full 5 secs  7. Limb Ataxia: 0-->Absent  8. Sensory: 1-->Mild-to-moderate sensory loss, patient feels pinprick is less sharp or is dull on the affected side, or there is a loss of superficial pain with pinprick, but patient is aware of being touched  9. Best Language: 0-->No aphasia, normal  10. Dysarthria: 0-->Normal  11. Extinction and Inattention (formerly Neglect): 0-->No  "abnormality  Total (NIH Stroke Scale): 1     Modified Morris Score: 0  Cody Coma Scale:    ABCD2 Score: 6  [id number] Score:   HAS -BLED Score:   ICH Score:   Hunt & Sheppard Classification:       Diagnoses:   Acute left-sided weakness    Acute left-sided weakness does not appear to be related to stroke but he does have significant risk factors thus he deserves a MRI of brain         Blood pressure (!) 159/73, pulse 83, temperature 98.2 °F (36.8 °C), temperature source Oral, resp. rate 19, height 5' 11" (1.803 m), weight 99.8 kg (220 lb), SpO2 98 %.  Alteplase Eligible?: No  Alteplase Recommendation: Alteplase not recommended due to Outside of treatment window   Possible Interventional Revascularization Candidate? No; No significant neurological deficit    Disposition Recommendation: pending further studies  admit to inpatient  do not transfer      Subjective:     History of Present Illness:  52 y/o male LKN at 1050 today when he developed left hemiparesis and numbness.      Woke up with symptoms?: no  Last known normal: Last Known Normal Date: 03/11/19 Last Known Normal Time: 1045    Recent bleeding noted: no  Does the patient take any Blood Thinners? yes  Medications: No relevant medications      Past Medical History: hypertension, diabetes and HIV    Past Surgical History: no major surgeries within the last 2 weeks    Family History: no relevant history    Social History: no smoking, no drinking, no drugs    Allergies: No Known Allergies     Review of Systems   Neurological: Positive for weakness and numbness.   All other systems reviewed and are negative.    Objective:   Vitals: Blood pressure (!) 159/73, pulse 83, temperature 98.2 °F (36.8 °C), temperature source Oral, resp. rate 19, height 5' 11" (1.803 m), weight 99.8 kg (220 lb), SpO2 98 %.     CT READ: Yes  No hemmorhage. No mass effect. No early infarct signs.     Physical Exam   Constitutional: He is oriented to person, place, and time. He appears " well-developed and well-nourished.   HENT:   Head: Normocephalic and atraumatic.   Eyes: EOM are normal. Pupils are equal, round, and reactive to light.   Cardiovascular: Normal rate and regular rhythm.   Pulmonary/Chest: Effort normal.   Neurological: He is alert and oriented to person, place, and time. A sensory deficit is present.   Vitals reviewed.            Recommended the emergency room physician to have a brief discussion with the patient and/or family if available regarding the risks and benefits of treatment, and to briefly document the occurrence of that discussion in his clinical encounter note.     The attending portion of this evaluation, treatment, and documentation was performed per Lisa Mendez MD via audiovisual.    Billing code:  (non-stroke, some mimics)    · This patient has neurological symptom(s)/condition/illness, with minimal potential for morbidity and mortality.  · There is a low probability for acute neurological change leading to clinical and possibly life-threatening deterioration requiring highest level of physician preparedness for urgent intervention.  · Care was coordinated with other physicians involved in the patient's care.  · Radiologic studies and laboratory data were reviewed and interpreted, and plan of care was re-assessed based on the results.  · Diagnosis, treatment options and prognosis may have been discussed with the patient and/or family members or caregiver.      In your opinion, this was a: Tier 1    Consult End Time: 7916     Lisa Mendez MD  Comprehensive Stroke Center  Vascular Neurology   [facility]

## 2019-03-12 LAB
ALBUMIN SERPL BCP-MCNC: 3.9 G/DL
ALP SERPL-CCNC: 95 U/L
ALT SERPL W/O P-5'-P-CCNC: 21 U/L
AMPHET+METHAMPHET UR QL: NEGATIVE
ANION GAP SERPL CALC-SCNC: 8 MMOL/L
AST SERPL-CCNC: 15 U/L
BARBITURATES UR QL SCN>200 NG/ML: NEGATIVE
BASOPHILS # BLD AUTO: 0.03 K/UL
BASOPHILS NFR BLD: 0.2 %
BENZODIAZ UR QL SCN>200 NG/ML: NEGATIVE
BILIRUB SERPL-MCNC: 0.7 MG/DL
BUN SERPL-MCNC: 15 MG/DL
BZE UR QL SCN: NEGATIVE
CALCIUM SERPL-MCNC: 9.5 MG/DL
CANNABINOIDS UR QL SCN: NEGATIVE
CHLORIDE SERPL-SCNC: 106 MMOL/L
CK MB SERPL-MCNC: 2.1 NG/ML
CK MB SERPL-RTO: 1.4 %
CK SERPL-CCNC: 149 U/L
CO2 SERPL-SCNC: 26 MMOL/L
CREAT SERPL-MCNC: 1.1 MG/DL
CREAT UR-MCNC: 206.5 MG/DL
DIFFERENTIAL METHOD: NORMAL
EOSINOPHIL # BLD AUTO: 0.1 K/UL
EOSINOPHIL NFR BLD: 1.1 %
ERYTHROCYTE [DISTWIDTH] IN BLOOD BY AUTOMATED COUNT: 13.3 %
EST. GFR  (AFRICAN AMERICAN): >60 ML/MIN/1.73 M^2
EST. GFR  (NON AFRICAN AMERICAN): >60 ML/MIN/1.73 M^2
ESTIMATED AVG GLUCOSE: 197 MG/DL
GLUCOSE SERPL-MCNC: 94 MG/DL
HBA1C MFR BLD HPLC: 8.5 %
HCT VFR BLD AUTO: 43.6 %
HGB BLD-MCNC: 14.2 G/DL
LYMPHOCYTES # BLD AUTO: 3.8 K/UL
LYMPHOCYTES NFR BLD: 30.4 %
MAGNESIUM SERPL-MCNC: 2.2 MG/DL
MCH RBC QN AUTO: 28.1 PG
MCHC RBC AUTO-ENTMCNC: 32.6 G/DL
MCV RBC AUTO: 86 FL
METHADONE UR QL SCN>300 NG/ML: NEGATIVE
MONOCYTES # BLD AUTO: 0.8 K/UL
MONOCYTES NFR BLD: 6.1 %
NEUTROPHILS # BLD AUTO: 7.7 K/UL
NEUTROPHILS NFR BLD: 62.2 %
OPIATES UR QL SCN: NEGATIVE
PCP UR QL SCN>25 NG/ML: NEGATIVE
PHOSPHATE SERPL-MCNC: 3.8 MG/DL
PLATELET # BLD AUTO: 331 K/UL
PMV BLD AUTO: 10.1 FL
POCT GLUCOSE: 106 MG/DL (ref 70–110)
POCT GLUCOSE: 124 MG/DL (ref 70–110)
POCT GLUCOSE: 203 MG/DL (ref 70–110)
POCT GLUCOSE: 86 MG/DL (ref 70–110)
POTASSIUM SERPL-SCNC: 3.8 MMOL/L
PROT SERPL-MCNC: 7.5 G/DL
RBC # BLD AUTO: 5.05 M/UL
SODIUM SERPL-SCNC: 140 MMOL/L
TOXICOLOGY INFORMATION: NORMAL
WBC # BLD AUTO: 12.35 K/UL

## 2019-03-12 PROCEDURE — 99222 1ST HOSP IP/OBS MODERATE 55: CPT | Mod: ,,, | Performed by: PSYCHIATRY & NEUROLOGY

## 2019-03-12 PROCEDURE — 82962 GLUCOSE BLOOD TEST: CPT

## 2019-03-12 PROCEDURE — 80053 COMPREHEN METABOLIC PANEL: CPT

## 2019-03-12 PROCEDURE — 82553 CREATINE MB FRACTION: CPT

## 2019-03-12 PROCEDURE — 97535 SELF CARE MNGMENT TRAINING: CPT

## 2019-03-12 PROCEDURE — 21400001 HC TELEMETRY ROOM

## 2019-03-12 PROCEDURE — 36415 COLL VENOUS BLD VENIPUNCTURE: CPT

## 2019-03-12 PROCEDURE — 85025 COMPLETE CBC W/AUTO DIFF WBC: CPT

## 2019-03-12 PROCEDURE — G8997 SWALLOW GOAL STATUS: HCPCS | Mod: CI

## 2019-03-12 PROCEDURE — 80307 DRUG TEST PRSMV CHEM ANLYZR: CPT

## 2019-03-12 PROCEDURE — G8996 SWALLOW CURRENT STATUS: HCPCS | Mod: CI

## 2019-03-12 PROCEDURE — 63600175 PHARM REV CODE 636 W HCPCS: Performed by: PHYSICIAN ASSISTANT

## 2019-03-12 PROCEDURE — 84100 ASSAY OF PHOSPHORUS: CPT

## 2019-03-12 PROCEDURE — 97161 PT EVAL LOW COMPLEX 20 MIN: CPT

## 2019-03-12 PROCEDURE — 83735 ASSAY OF MAGNESIUM: CPT

## 2019-03-12 PROCEDURE — 25000003 PHARM REV CODE 250: Performed by: HOSPITALIST

## 2019-03-12 PROCEDURE — 99222 PR INITIAL HOSPITAL CARE,LEVL II: ICD-10-PCS | Mod: ,,, | Performed by: PSYCHIATRY & NEUROLOGY

## 2019-03-12 PROCEDURE — 97165 OT EVAL LOW COMPLEX 30 MIN: CPT

## 2019-03-12 PROCEDURE — 82550 ASSAY OF CK (CPK): CPT

## 2019-03-12 PROCEDURE — 92610 EVALUATE SWALLOWING FUNCTION: CPT

## 2019-03-12 RX ORDER — ASPIRIN 325 MG
325 TABLET ORAL DAILY
Status: DISCONTINUED | OUTPATIENT
Start: 2019-03-13 | End: 2019-03-14 | Stop reason: HOSPADM

## 2019-03-12 RX ORDER — ACETAMINOPHEN 325 MG/1
650 TABLET ORAL EVERY 6 HOURS PRN
Status: DISCONTINUED | OUTPATIENT
Start: 2019-03-12 | End: 2019-03-14 | Stop reason: HOSPADM

## 2019-03-12 RX ORDER — ATORVASTATIN CALCIUM 40 MG/1
40 TABLET, FILM COATED ORAL DAILY
Status: DISCONTINUED | OUTPATIENT
Start: 2019-03-12 | End: 2019-03-14 | Stop reason: HOSPADM

## 2019-03-12 RX ADMIN — ACETAMINOPHEN 650 MG: 325 TABLET, FILM COATED ORAL at 12:03

## 2019-03-12 RX ADMIN — INSULIN ASPART 4 UNITS: 100 INJECTION, SOLUTION INTRAVENOUS; SUBCUTANEOUS at 05:03

## 2019-03-12 RX ADMIN — ATORVASTATIN CALCIUM 40 MG: 40 TABLET, FILM COATED ORAL at 12:03

## 2019-03-12 NOTE — CONSULTS
"RN generated nutr consult received re: "questionable safe swallowing".  ST curtis notes reviewed; pt's diet advanced per their recommendations.  No nutr interventions re: dysphagia needed at this time in light of this. Should other nutr issues arise, please re-consult RD. Thank you.   "

## 2019-03-12 NOTE — PT/OT/SLP EVAL
Speech Language Pathology Evaluation  Bedside Swallow    Patient Name:  Lore Shrestha   MRN:  [id number]  Admitting Diagnosis: CVA (cerebral vascular accident)    Recommendations:                 General Recommendations:  Dysphagia therapy  Diet recommendations:  Dental Soft, Thin   Aspiration Precautions: 1 bite/sip at a time, Alternating bites/sips, HOB to 90 degrees, Meds whole 1 at a time, Remain upright 30 minutes post meal, Small bites/sips and Standard aspiration precautions   General Precautions: Standard, fall, dental soft  Communication strategies:  none    History:     Past Medical History:   Diagnosis Date    Diabetes mellitus     HIV (human immunodeficiency virus infection)     Hypertension        Past Surgical History:   Procedure Laterality Date    NO PAST SURGERIES  03/11/2019       Social History: Patient lives at home.    Chest X-Rays: 3/1/2019: There is no pleural effusion.  The trachea is midline.  The lungs are symmetrically expanded bilaterally without evidence of acute parenchymal process. No large focal consolidation seen.    Prior diet: Unrestricted      Subjective     Pt seen for swallow eval seated in chair at bedside. Pt reported slightly decreased sensation of L facial musculature    Patient goals: To get better and go home    Pain/Comfort:  · Pain Rating 1: 0/10    Objective:     Oral Musculature Evaluation  · Dentition: present and adequate  · Secretion Management: adequate  · Mucosal Quality: good  · Mandibular Strength and Mobility: WFL  · Oral Labial Strength and Mobility: WFL  · Lingual Strength and Mobility: WFL  · Velar Elevation: WFL  · Buccal Strength and Mobility: WFL  · Volitional Cough: strong/ productive  · Voice Prior to PO Intake: clear/dry  · Oral Musculature Comments: Labial & lingual musculature is WFL. Slightly decreased sensation on L face    Bedside Swallow Eval:   Consistencies Assessed:  · Thin liquids via straw x 4  · Puree x 3 trials  · Soft solids x 3 trials      Oral Phase:   · WFL    Pharyngeal Phase:   · no overt clinical signs/symptoms of aspiration    Compensatory Strategies  · None    Treatment: Rec: ST to monitor pt's diet tolerance    Assessment:     Lore Shrestha is a 53 y.o. male with a dx of CVA.  He presents with functional swallow with no overt s/s of aspiration across consistencies. Rec: Dental soft diet and thin liquids. ST will follow pt to monitor diet tolerance.    Goals:   Multidisciplinary Problems     SLP Goals        Problem: SLP Goal    Goal Priority Disciplines Outcome   SLP Goal    Low SLP Ongoing (interventions implemented as appropriate)   Description:  ST. Pt will participate in swallow eval to determine least restrictive diet with no overt s/s of aspiration.-GOAL MET 3/12  2. Pt will tolerate PO diet of dental soft and thin liquids with no overt s/s of aspiration over 1-2 sessions.                    Plan:     · Patient to be seen:  2 x/week   · Plan of Care expires:  19  · Plan of Care reviewed with:  patient   · SLP Follow-Up:  Yes       Discharge recommendations:  (TBD)   Barriers to Discharge:  None    Time Tracking:     SLP Treatment Date:   19  Speech Start Time:  1332  Speech Stop Time:  1354     Speech Total Time (min):  22 min    Billable Minutes: Eval Swallow and Oral Function 12 min : self care 10 min    Negar Patterson CCC-SLP  2019

## 2019-03-12 NOTE — PLAN OF CARE
Problem: Occupational Therapy Goal  Goal: Occupational Therapy Goal  Goals to be met by: 3/26/19    Patient will increase functional independence with ADLs by performing:    UE Dressing with Modified [place].  LE Dressing with Modified [place].  Grooming while standing at sink with Modified [place].  Supine to sit with Modified [place].  Toilet transfer to toilet with Modified Indepen dence.  Upper extremity exercise program x15 reps per handout, with independence.    Outcome: Ongoing (interventions implemented as appropriate)  OT eval is complete. The patient will benefit from OT to address functional deficits    Comments: The patient will benefit from Out patient OT.

## 2019-03-12 NOTE — PLAN OF CARE
Problem: SLP Goal  Goal: SLP Goal  ST. Pt will participate in swallow eval to determine least restrictive diet with no overt s/s of aspiration.-GOAL MET 3/12  2. Pt will tolerate PO diet of dental soft and thin liquids with no overt s/s of aspiration over 1-2 sessions.  Outcome: Ongoing (interventions implemented as appropriate)  3/12  ST: Swallow Eval completed. Swallow WFL. No overt s/s of aspiration. Rec: Dental soft with thin liquids. ST will follow pt for diet tolerance. Negar Patterson CCC-SLP

## 2019-03-12 NOTE — PROGRESS NOTES
[facility] Hospital Medicine  Progress Note    Patient Name: Lore Shrestha  MRN: [id number]  Patient Class: IP- Inpatient   Admission Date: 3/11/2019  Length of Stay: 1 days  Attending Physician: Patricia Boyle MD  Primary Care Provider: Helen Spencer NP        Subjective:     Principal Problem:CVA (cerebral vascular accident)    HPI:  Lore Shrestha 53 y.o. male with HTN, HLD, HIV, and DM2 presents to the hospital with a chief complaint of left sided weakness and numbness which began suddenly at 10:30am. He reports the numbness on his left side has been constant since that time and is still present now. He denies any recent head trauma or falls. He also reports he feels as though his left arm is weaker. He denies any slurred speech, difficulty swallowing, or facial droop. His sister reports she felt as though he was slurring his speech this morning. He denies any weakness or altered sensation to his lower extremities. His last CD4 count was greater than 500 and viral load undetectable he follows with [facility] for treatment. He has juana receiving treatment with orthopedics for left shoulder tendinitis. He reports his left shoulder feels as though it is stiff. He reports on 3/6 he received an injection in his left shoulder. He endorses left sided weakness and numbness. He denies fever, chest pain, SOB, N/V/D, abdominal pain, dysuria, syncope, and head trauma.     In the ED, vascular neuro consulted and recommended admission for MRI, CT head without acute abnormality, chest x-ray without acute process, and EKG of NSR.     Hospital Course:  Lore Shrestha 53 y.o. male placed in observation for left sided weakness. In the ED, vascular neuro consulted and recommended admission for MRI, CT head without acute abnormality, chest x-ray without acute process, and EKG of NSR. MRI show,Small focus of acute infarction in the right thalamus.  No evidence of hemorrhagic conversion.PT,OT,ST has juana  consulted,neurology as well.  On ASA,statin,check also carotid doppler,echo,MRA brain,  on permissive HTN.    Past Medical History:   Diagnosis Date    Diabetes mellitus     HIV (human immunodeficiency virus infection)     Hypertension        Past Surgical History:   Procedure Laterality Date    NO PAST SURGERIES  03/11/2019       Review of patient's allergies indicates:  No Known Allergies    No current facility-administered medications on file prior to encounter.      Current Outpatient Medications on File Prior to Encounter   Medication Sig    aspirin 81 MG Chew Take 1 tablet (81 mg total) by mouth once daily.    atorvastatin (LIPITOR) 40 MG tablet Take 40 mg by mouth once daily.    etravirine (INTELENCE) 100 mg Tab Take 200 mg by mouth 2 (two) times daily with meals.    hydrochlorothiazide (HYDRODIURIL) 25 MG tablet Take 1 tablet (25 mg total) by mouth once daily.    insulin lispro protamine-insulin lispro (HUMALOG 75/25) 100 unit/mL (75-25) Susp Inject 100 Units into the skin 2 (two) times daily before meals.    lisinopril (PRINIVIL,ZESTRIL) 20 MG tablet Take 20 mg by mouth once daily.    metformin (GLUCOPHAGE) 500 MG tablet Take 500 mg by mouth 2 (two) times daily with meals.    amitriptyline (ELAVIL) 25 MG tablet Take 25 mg by mouth nightly as needed.    darunavir (PREZISTA) 400 MG Tab Take by mouth.    ibuprofen (ADVIL,MOTRIN) 600 MG tablet Take 1 tablet (600 mg total) by mouth 3 (three) times daily.    ritonavir (NORVIR) 100 mg Cap Take 600 mg by mouth 2 (two) times daily.    tenofovir (VIREAD) 300 mg Tab Take 300 mg by mouth once daily.     Family History     None        Tobacco Use    Smoking status: Never Smoker    Smokeless tobacco: Never Used   Substance and Sexual Activity    Alcohol use: No    Drug use: No    Sexual activity: Not on file     Review of Systems   Constitutional: Negative for chills and fever.   HENT: Negative for nosebleeds and tinnitus.    Eyes: Negative for  photophobia and visual disturbance.   Respiratory: Negative for shortness of breath and wheezing.    Cardiovascular: Negative for chest pain, palpitations and leg swelling.   Gastrointestinal: Negative for abdominal distention, nausea and vomiting.   Genitourinary: Negative for dysuria, flank pain and hematuria.   Musculoskeletal: Positive for arthralgias (left shoulder). Negative for gait problem and joint swelling.   Skin: Negative for rash and wound.   Neurological: Positive for weakness and numbness. Negative for seizures, syncope and facial asymmetry.     Objective:     Vital Signs (Most Recent):  Temp: 97.9 °F (36.6 °C) (03/12/19 0738)  Pulse: 86 (03/12/19 0738)  Resp: 18 (03/12/19 0738)  BP: (!) 146/82 (03/12/19 0738)  SpO2: (!) 94 % (03/12/19 0738) Vital Signs (24h Range):  Temp:  [97.6 °F (36.4 °C)-98.4 °F (36.9 °C)] 97.9 °F (36.6 °C)  Pulse:  [72-88] 86  Resp:  [18-21] 18  SpO2:  [94 %-99 %] 94 %  BP: (132-203)/() 146/82     Weight: 98 kg (216 lb 0.8 oz)  Body mass index is 30.13 kg/m².    Physical Exam   Constitutional: He is oriented to person, place, and time. He appears well-developed and well-nourished. No distress.   HENT:   Head: Normocephalic and atraumatic.   Right Ear: External ear normal.   Left Ear: External ear normal.   Eyes: Conjunctivae and EOM are normal. Right eye exhibits no discharge. Left eye exhibits no discharge.   Neck: Normal range of motion. No thyromegaly present.   Cardiovascular: Normal rate and regular rhythm.   No murmur heard.  Pulmonary/Chest: Effort normal and breath sounds normal. No respiratory distress.   Abdominal: Soft. Bowel sounds are normal. He exhibits no distension and no mass. There is no tenderness.   Musculoskeletal: He exhibits tenderness. He exhibits no edema or deformity.   At left bicep extending into shoulder. Patient reports pain with left shoulder flexion.   Neurological: He is alert and oriented to person, place, and time. A sensory deficit  (reports decreased sensation to left side) is present. He exhibits normal muscle tone.   5/5 strength on RUE with finger strength and elbow flexion. LUE 4/5 strength with  strength, elbow flexion and shoulder abduction. Pain with shoulder flexion. Finger to nose intact bilaterally, heel to shin intact bilaterally, thumb to finger intact bilaterally, negative romberg.   Intermittently raises both eyebrows, able to furrow both eyebrows.    Skin: Skin is warm and dry.   Psychiatric: He has a normal mood and affect. His behavior is normal.   Nursing note and vitals reviewed.        CRANIAL NERVES     CN III, IV, VI   Extraocular motions are normal.        Significant Labs:   CBC:   Recent Labs   Lab 03/11/19 1210 03/12/19  0440   WBC 11.88 12.35   HGB 14.2 14.2   HCT 43.3 43.6    331     CMP:   Recent Labs   Lab 03/11/19  1210 03/12/19  0440    140   K 3.8 3.8    106   CO2 24 26   * 94   BUN 17 15   CREATININE 1.3 1.1   CALCIUM 10.1 9.5   PROT 8.0 7.5   ALBUMIN 4.1 3.9   BILITOT 0.4 0.7   ALKPHOS 128 95   AST 18 15   ALT 27 21   ANIONGAP 11 8   EGFRNONAA >60 >60     Lipid Panel:   Recent Labs   Lab 03/11/19  1210   CHOL 195   HDL 41   LDLCALC 117.4   TRIG 183*   CHOLHDL 21.0     TSH:   Recent Labs   Lab 03/11/19  1210   TSH 0.611       Significant Imaging:   Imaging Results          MRI Brain Without Contrast (Final result)     Abnormal  Result time 03/11/19 18:54:49    Final result by Mike Carr MD (03/11/19 18:54:49)                 Impression:      Small focus of acute infarction in the right thalamus.  No evidence of hemorrhagic conversion.    This report was flagged in Epic as abnormal.      Electronically signed by: Mike Carr MD  Date:    03/11/2019  Time:    18:54             Narrative:    EXAMINATION:  MRI BRAIN WITHOUT CONTRAST    CLINICAL HISTORY:  left sided numbness; Weakness    TECHNIQUE:  Multiplanar multisequence MR imaging of the brain was performed without contrast.   Evaluation of the anterior portions of the brain is limited due to streak artifact from possible dental hardware.    COMPARISON:  CT scan of the head dated 03/11/2019.    FINDINGS:  The craniocervical junction is unremarkable.  The intracranial flow voids are within normal limits.    There is a small focus of diffusion restriction in the right thalamus.  There is associated T2/FLAIR signal hyperintensity corresponding to the region of diffusion restriction.  There are two foci of subcentimeter T2/FLAIR signal hyperintensity in the anterior periventricular white matter.  The ventricles and sulci are otherwise unremarkable.  There are no extra-axial fluid collections.  There is no evidence of intracranial hemorrhage.  There is no evidence of mass effect.    The orbits and intraorbital contents are unremarkable.  The paranasal sinuses and mastoid air cells are clear.  The calvarium is intact.                               X-Ray Chest AP Portable (Final result)  Result time 03/11/19 13:03:33    Final result by Elias Handley MD (03/11/19 13:03:33)                 Impression:      1. No acute cardiopulmonary process, hypoventilatory exam.      Electronically signed by: Elias Handley MD  Date:    03/11/2019  Time:    13:03             Narrative:    EXAMINATION:  XR CHEST AP PORTABLE    CLINICAL HISTORY:  Stroke;    TECHNIQUE:  Single frontal view of the chest was performed.    COMPARISON:  04/08/2013    FINDINGS:  The cardiomediastinal silhouette is not enlarged, magnified by technique..  There is no pleural effusion.  The trachea is midline.  The lungs are symmetrically expanded bilaterally without evidence of acute parenchymal process. No large focal consolidation seen.  There is no pneumothorax.  The osseous structures are remarkable for degenerative changes..                               CT Head Without Contrast (Final result)  Result time 03/11/19 12:19:19    Final result by Zach Ackerman MD (03/11/19  12:19:19)                 Impression:      No acute intracranial abnormalities are identified.  No evidence for intracranial hemorrhage.      Electronically signed by: Zach Ackerman MD  Date:    03/11/2019  Time:    12:19             Narrative:    EXAMINATION:  CT HEAD WITHOUT CONTRAST    CLINICAL HISTORY:  Stroke;left sided weakness;    TECHNIQUE:  Low dose axial images were obtained through the head.  Coronal and sagittal reformations were also performed. Contrast was not administered.    COMPARISON:  None.    FINDINGS:  The ventricles are normal in size and configuration.  There is normal gray-white matter differentiation maintained.  There is no evidence for abnormal masses, mass effect, or midline shift.  No abnormal intra or extra-axial fluid collections are identified, specifically there is no evidence for intracranial hemorrhage.  Mastoid air cells and visualized paranasal sinuses are clear.  Bony calvarium is intact.                                  Assessment/Plan:      * CVA (cerebral vascular accident)    Acute onset left sided weakness and numbness today at 10:30am today. He also reports numbness of left arm, however on exam he has tenderness of left bicep tendon and pain with flexion of left shoulder. CT head without acute abnormality.   MRI  Echo  Neurology consulted  PT/OT/SLP  Neuro checks, aspiration precautions  Will provide IVF as patient failed nursing swallow screen downstairs. Repeat pending.   MRI show,Small focus of acute infarction in the right thalamus.  No evidence of hemorrhagic conversion.PT,OT,ST has juana consulted,neurology as well.  On ASA,statin,check also carotid doppler,echo,MRA brain,     Hyperlipidemia    Will continue home statin when able to tolerate PO.       Left sided numbness    See above       Acute left-sided weakness    Due to CVA     Essential hypertension    Poorly controlled. Will hold home medications as patient failed swallow study down stairs and until MRI for  stroke rule out. Allow permissive HTN to 220, patient reports ongoing numbness of left arm. PRN hydralazine for above 220.      Diabetes mellitus type II, uncontrolled    Hold home oral hypoglycemics. Sliding scale insulin. Hypoglycemic protocol. POCT glucose checks. Patient failed swallow study in ED.      HIV (human immunodeficiency virus infection)    Will continue home intelence, prerzista, and norvir when tolerating PO. Failed swallow screen in the ED.        VTE Risk Mitigation (From admission, onward)        Ordered     IP VTE HIGH RISK PATIENT  Once      03/11/19 1519     Place sequential compression device  Until discontinued      03/11/19 1519     Place JOSE E hose  Until discontinued      03/11/19 1519              Patricia Boyle MD  Department of Hospital Medicine   [facility]

## 2019-03-12 NOTE — PLAN OF CARE
Problem: Physical Therapy Goal  Goal: Physical Therapy Goal  Outcome: Outcome(s) achieved Date Met: 03/12/19    Patient ambulated 100ft with no device and supervision. He was limited in ambulation due to left calf cramping. Nurse Winter notified. He has no needs for PT while in the hospital. He is okay to ambulate in hallway with nursing staff supervision.

## 2019-03-12 NOTE — PT/OT/SLP EVAL
Occupational Therapy   Evaluation    Name: Lore Shrestha  MRN: [id number]  Admitting Diagnosis:  CVA (cerebral vascular accident)      Recommendations:     Discharge Recommendations: other (see comments)(out patient OT)  Discharge Equipment Recommendations:  none  Barriers to discharge:  Decreased caregiver support, Inaccessible home environment    Assessment:     Lore Shrestha is a 53 y.o. male with a medical diagnosis of CVA (cerebral vascular accident).  He presents with decreased coordination and sensation in the left UE.. Performance deficits affecting function: weakness, pain, impaired fine motor, impaired functional mobilty, impaired sensation, impaired balance, decreased upper extremity function, impaired self care skills, impaired endurance, decreased coordination.      Rehab Prognosis: Good; patient would benefit from acute skilled OT services to address these deficits and reach maximum level of function.       Plan:     Patient to be seen 3 x/week to address the above listed problems via self-care/home management, therapeutic activities, therapeutic exercises, neuromuscular re-education  · Plan of Care Expires: 03/26/19  · Plan of Care Reviewed with:      Subjective     Chief Complaint: left arm is weak and numb  Patient/Family Comments/goals: return to [facility]    Occupational Profile:  Living Environment: The patient lives alone [address]les [address] floor apt with an elevator present.  Previous level of function: (I) self care and amb without AD  Roles and Routines: drives and works at [employer]  Equipment Used at Home:  none  Assistance upon Discharge: none stated    Pain/Comfort:  · Pain Rating 1: 9/10  · Location 1: (headache)  · Pain Addressed 1: Pre-medicate for activity(pain meds received during OT eval)    Patients cultural, spiritual, Sabianist conflicts given the current situation: no    Objective:     Communicated with: Winter burgess prior to session.  Patient found HOB elevated with telemetry, peripheral IV upon  "OT entry to room.    General Precautions: Standard, diabetic, fall   Orthopedic Precautions:N/A   Braces: N/A     Occupational Performance:    Bed Mobility:    · Patient completed Scooting/Bridging with stand by assistance  · Patient completed Supine to Sit with stand by assistance    Functional Mobility/Transfers:  · Patient completed Sit <> Stand Transfer with stand by assistance  with  no assistive device   · Functional Mobility: The patient amb without AD with SBA in the hallway. The patient c/o cramping in the left leg and stopped amb.    Activities of Daily Living:  · Upper Body Dressing: contact guard assistance to don back gown  · Lower Body Dressing: stand by assistance      Cognitive/Visual Perceptual:  Cognitive/Psychosocial Skills:     -       Oriented to: Person, Place, Time and Situation   -       Follows Commands/attention:Follows multistep  commands  -       Communication: clear/fluent  -       Memory: No Deficits noted  -       Safety awareness/insight to disability: intact   -       Mood/Affect/Coping skills/emotional control: Appropriate to situation  Visual/Perceptual:      -Intact No c/o visual deficits    Physical Exam:  Balance: -       good  Postural examination/scapula alignment:    -       No postural abnormalities identified  Skin integrity: Visible skin intact  Edema:  None noted  Sensation:    -       Impaired  c/o numbness in the left arm  Dominant hand: -       right  Upper Extremity Range of Motion:     -       Right Upper Extremity: WFL  -       Left Upper Extremity: WFL except left shoulder from previous injury with injection for "tendonitis"  Upper Extremity Strength:    -       Right Upper Extremity: WFL  -       Left Upper Extremity: weakness throughout LUE with c/o numbness   Strength:    -       Right Upper Extremity: WFL  -       Left Upper Extremity: slightly weak    AMPAC 6 Click ADL:  AMPAC Total Score: 23    Treatment & Education:  OT eval is complete. The patient was " educated re: OT role and POC.  Education:    Patient left up in chair with all lines intact, call button in reach and nurseWinter notified    GOALS:   Multidisciplinary Problems     Occupational Therapy Goals        Problem: Occupational Therapy Goal    Goal Priority Disciplines Outcome Interventions   Occupational Therapy Goal     OT, PT/OT Ongoing (interventions implemented as appropriate)    Description:  Goals to be met by: 3/26/19    Patient will increase functional independence with ADLs by performing:    UE Dressing with Modified [place].  LE Dressing with Modified [place].  Grooming while standing at sink with Modified [place].  Supine to sit with Modified [place].  Toilet transfer to toilet with Modified [place].  Upper extremity exercise program x15 reps per handout, with independence.                      History:     Past Medical History:   Diagnosis Date    Diabetes mellitus     HIV (human immunodeficiency virus infection)     Hypertension        Past Surgical History:   Procedure Laterality Date    NO PAST SURGERIES  03/11/2019       Time Tracking:     OT Date of Treatment: 03/12/19  OT Start Time: 1206  OT Stop Time: 1231  OT Total Time (min): 25 min    Billable Minutes:Evaluation 15 (with PT)  Total Time 25    Jannette Charles OT  3/12/2019

## 2019-03-12 NOTE — SUBJECTIVE & OBJECTIVE
Past Medical History:   Diagnosis Date    Diabetes mellitus     HIV (human immunodeficiency virus infection)     Hypertension        Past Surgical History:   Procedure Laterality Date    NO PAST SURGERIES  03/11/2019       Review of patient's allergies indicates:  No Known Allergies    No current facility-administered medications on file prior to encounter.      Current Outpatient Medications on File Prior to Encounter   Medication Sig    aspirin 81 MG Chew Take 1 tablet (81 mg total) by mouth once daily.    atorvastatin (LIPITOR) 40 MG tablet Take 40 mg by mouth once daily.    etravirine (INTELENCE) 100 mg Tab Take 200 mg by mouth 2 (two) times daily with meals.    hydrochlorothiazide (HYDRODIURIL) 25 MG tablet Take 1 tablet (25 mg total) by mouth once daily.    insulin lispro protamine-insulin lispro (HUMALOG 75/25) 100 unit/mL (75-25) Susp Inject 100 Units into the skin 2 (two) times daily before meals.    lisinopril (PRINIVIL,ZESTRIL) 20 MG tablet Take 20 mg by mouth once daily.    metformin (GLUCOPHAGE) 500 MG tablet Take 500 mg by mouth 2 (two) times daily with meals.    amitriptyline (ELAVIL) 25 MG tablet Take 25 mg by mouth nightly as needed.    darunavir (PREZISTA) 400 MG Tab Take by mouth.    ibuprofen (ADVIL,MOTRIN) 600 MG tablet Take 1 tablet (600 mg total) by mouth 3 (three) times daily.    ritonavir (NORVIR) 100 mg Cap Take 600 mg by mouth 2 (two) times daily.    tenofovir (VIREAD) 300 mg Tab Take 300 mg by mouth once daily.     Family History     None        Tobacco Use    Smoking status: Never Smoker    Smokeless tobacco: Never Used   Substance and Sexual Activity    Alcohol use: No    Drug use: No    Sexual activity: Not on file     Review of Systems   Constitutional: Negative for chills and fever.   HENT: Negative for nosebleeds and tinnitus.    Eyes: Negative for photophobia and visual disturbance.   Respiratory: Negative for shortness of breath and wheezing.     Cardiovascular: Negative for chest pain, palpitations and leg swelling.   Gastrointestinal: Negative for abdominal distention, nausea and vomiting.   Genitourinary: Negative for dysuria, flank pain and hematuria.   Musculoskeletal: Positive for arthralgias (left shoulder). Negative for gait problem and joint swelling.   Skin: Negative for rash and wound.   Neurological: Positive for weakness and numbness. Negative for seizures, syncope and facial asymmetry.     Objective:     Vital Signs (Most Recent):  Temp: 97.9 °F (36.6 °C) (03/12/19 0738)  Pulse: 86 (03/12/19 0738)  Resp: 18 (03/12/19 0738)  BP: (!) 146/82 (03/12/19 0738)  SpO2: (!) 94 % (03/12/19 0738) Vital Signs (24h Range):  Temp:  [97.6 °F (36.4 °C)-98.4 °F (36.9 °C)] 97.9 °F (36.6 °C)  Pulse:  [72-88] 86  Resp:  [18-21] 18  SpO2:  [94 %-99 %] 94 %  BP: (132-203)/() 146/82     Weight: 98 kg (216 lb 0.8 oz)  Body mass index is 30.13 kg/m².    Physical Exam   Constitutional: He is oriented to person, place, and time. He appears well-developed and well-nourished. No distress.   HENT:   Head: Normocephalic and atraumatic.   Right Ear: External ear normal.   Left Ear: External ear normal.   Eyes: Conjunctivae and EOM are normal. Right eye exhibits no discharge. Left eye exhibits no discharge.   Neck: Normal range of motion. No thyromegaly present.   Cardiovascular: Normal rate and regular rhythm.   No murmur heard.  Pulmonary/Chest: Effort normal and breath sounds normal. No respiratory distress.   Abdominal: Soft. Bowel sounds are normal. He exhibits no distension and no mass. There is no tenderness.   Musculoskeletal: He exhibits tenderness. He exhibits no edema or deformity.   At left bicep extending into shoulder. Patient reports pain with left shoulder flexion.   Neurological: He is alert and oriented to person, place, and time. A sensory deficit (reports decreased sensation to left side) is present. He exhibits normal muscle tone.   5/5 strength on  RUE with finger strength and elbow flexion. LUE 4/5 strength with  strength, elbow flexion and shoulder abduction. Pain with shoulder flexion. Finger to nose intact bilaterally, heel to shin intact bilaterally, thumb to finger intact bilaterally, negative romberg.   Intermittently raises both eyebrows, able to furrow both eyebrows.    Skin: Skin is warm and dry.   Psychiatric: He has a normal mood and affect. His behavior is normal.   Nursing note and vitals reviewed.        CRANIAL NERVES     CN III, IV, VI   Extraocular motions are normal.        Significant Labs:   CBC:   Recent Labs   Lab 03/11/19  1210 03/12/19  0440   WBC 11.88 12.35   HGB 14.2 14.2   HCT 43.3 43.6    331     CMP:   Recent Labs   Lab 03/11/19  1210 03/12/19  0440    140   K 3.8 3.8    106   CO2 24 26   * 94   BUN 17 15   CREATININE 1.3 1.1   CALCIUM 10.1 9.5   PROT 8.0 7.5   ALBUMIN 4.1 3.9   BILITOT 0.4 0.7   ALKPHOS 128 95   AST 18 15   ALT 27 21   ANIONGAP 11 8   EGFRNONAA >60 >60     Lipid Panel:   Recent Labs   Lab 03/11/19  1210   CHOL 195   HDL 41   LDLCALC 117.4   TRIG 183*   CHOLHDL 21.0     TSH:   Recent Labs   Lab 03/11/19  1210   TSH 0.611       Significant Imaging:   Imaging Results          MRI Brain Without Contrast (Final result)     Abnormal  Result time 03/11/19 18:54:49    Final result by Mike Carr MD (03/11/19 18:54:49)                 Impression:      Small focus of acute infarction in the right thalamus.  No evidence of hemorrhagic conversion.    This report was flagged in Epic as abnormal.      Electronically signed by: Mike Carr MD  Date:    03/11/2019  Time:    18:54             Narrative:    EXAMINATION:  MRI BRAIN WITHOUT CONTRAST    CLINICAL HISTORY:  left sided numbness; Weakness    TECHNIQUE:  Multiplanar multisequence MR imaging of the brain was performed without contrast.  Evaluation of the anterior portions of the brain is limited due to streak artifact from possible dental  hardware.    COMPARISON:  CT scan of the head dated 03/11/2019.    FINDINGS:  The craniocervical junction is unremarkable.  The intracranial flow voids are within normal limits.    There is a small focus of diffusion restriction in the right thalamus.  There is associated T2/FLAIR signal hyperintensity corresponding to the region of diffusion restriction.  There are two foci of subcentimeter T2/FLAIR signal hyperintensity in the anterior periventricular white matter.  The ventricles and sulci are otherwise unremarkable.  There are no extra-axial fluid collections.  There is no evidence of intracranial hemorrhage.  There is no evidence of mass effect.    The orbits and intraorbital contents are unremarkable.  The paranasal sinuses and mastoid air cells are clear.  The calvarium is intact.                               X-Ray Chest AP Portable (Final result)  Result time 03/11/19 13:03:33    Final result by Elias Handley MD (03/11/19 13:03:33)                 Impression:      1. No acute cardiopulmonary process, hypoventilatory exam.      Electronically signed by: Elias Handley MD  Date:    03/11/2019  Time:    13:03             Narrative:    EXAMINATION:  XR CHEST AP PORTABLE    CLINICAL HISTORY:  Stroke;    TECHNIQUE:  Single frontal view of the chest was performed.    COMPARISON:  04/08/2013    FINDINGS:  The cardiomediastinal silhouette is not enlarged, magnified by technique..  There is no pleural effusion.  The trachea is midline.  The lungs are symmetrically expanded bilaterally without evidence of acute parenchymal process. No large focal consolidation seen.  There is no pneumothorax.  The osseous structures are remarkable for degenerative changes..                               CT Head Without Contrast (Final result)  Result time 03/11/19 12:19:19    Final result by Zach Ackerman MD (03/11/19 12:19:19)                 Impression:      No acute intracranial abnormalities are identified.  No evidence  for intracranial hemorrhage.      Electronically signed by: Zach Ackerman MD  Date:    03/11/2019  Time:    12:19             Narrative:    EXAMINATION:  CT HEAD WITHOUT CONTRAST    CLINICAL HISTORY:  Stroke;left sided weakness;    TECHNIQUE:  Low dose axial images were obtained through the head.  Coronal and sagittal reformations were also performed. Contrast was not administered.    COMPARISON:  None.    FINDINGS:  The ventricles are normal in size and configuration.  There is normal gray-white matter differentiation maintained.  There is no evidence for abnormal masses, mass effect, or midline shift.  No abnormal intra or extra-axial fluid collections are identified, specifically there is no evidence for intracranial hemorrhage.  Mastoid air cells and visualized paranasal sinuses are clear.  Bony calvarium is intact.

## 2019-03-12 NOTE — CONSULTS
[facility]  Neurology  Consult Note    Patient Name: Lore Shrestha  MRN: [id number]  Admission Date: 3/11/2019  Hospital Length of Stay: 1 days  Code Status: Full Code   Attending Provider: Patricia Boyle MD   Consulting Provider: Mayur Guzman MD  Primary Care Physician: Helen Spencer NP  Principal Problem:CVA (cerebral vascular accident)    Consults  Subjective:     Chief Complaint: Numbness on my left side     HPI: 52 y/o male with medical Hx as listed below comes to ED after sudden onset of numbness on the left side of his body. Pt jacque headaches, visual or speech disturbances, vertigo, weakness. No aggravating or alleviating factors. No previous episodes. Mr. Shrestha tells me that his BP has been high.    Past Medical History:   Diagnosis Date    Diabetes mellitus     HIV (human immunodeficiency virus infection)     Hypertension        Past Surgical History:   Procedure Laterality Date    NO PAST SURGERIES  03/11/2019       Review of patient's allergies indicates:  No Known Allergies    Current Neurological Medications:     No current facility-administered medications on file prior to encounter.      Current Outpatient Medications on File Prior to Encounter   Medication Sig    aspirin 81 MG Chew Take 1 tablet (81 mg total) by mouth once daily.    atorvastatin (LIPITOR) 40 MG tablet Take 40 mg by mouth once daily.    etravirine (INTELENCE) 100 mg Tab Take 200 mg by mouth 2 (two) times daily with meals.    hydrochlorothiazide (HYDRODIURIL) 25 MG tablet Take 1 tablet (25 mg total) by mouth once daily.    insulin lispro protamine-insulin lispro (HUMALOG 75/25) 100 unit/mL (75-25) Susp Inject 100 Units into the skin 2 (two) times daily before meals.    lisinopril (PRINIVIL,ZESTRIL) 20 MG tablet Take 20 mg by mouth once daily.    metformin (GLUCOPHAGE) 500 MG tablet Take 500 mg by mouth 2 (two) times daily with meals.    amitriptyline (ELAVIL) 25 MG tablet Take 25 mg by mouth  nightly as needed.    darunavir (PREZISTA) 400 MG Tab Take by mouth.    ibuprofen (ADVIL,MOTRIN) 600 MG tablet Take 1 tablet (600 mg total) by mouth 3 (three) times daily.    ritonavir (NORVIR) 100 mg Cap Take 600 mg by mouth 2 (two) times daily.    tenofovir (VIREAD) 300 mg Tab Take 300 mg by mouth once daily.      Family History     None        Tobacco Use    Smoking status: Never Smoker    Smokeless tobacco: Never Used   Substance and Sexual Activity    Alcohol use: No    Drug use: No    Sexual activity: Not on file     Review of Systems   Constitutional: Negative for fever.   HENT: Negative for trouble swallowing.    Eyes: Negative for photophobia.   Respiratory: Negative for shortness of breath.    Cardiovascular: Negative for chest pain.   Gastrointestinal: Negative for abdominal pain.   Genitourinary: Negative for dysuria.   Musculoskeletal: Negative for back pain.   Neurological: Negative for headaches.   Psychiatric/Behavioral: Negative for confusion.     Objective:     Vital Signs (Most Recent):  Temp: 97.9 °F (36.6 °C) (03/12/19 1157)  Pulse: 83 (03/12/19 1157)  Resp: 18 (03/12/19 1157)  BP: (!) 142/83 (03/12/19 1157)  SpO2: 96 % (03/12/19 1157) Vital Signs (24h Range):  Temp:  [97.6 °F (36.4 °C)-98.4 °F (36.9 °C)] 97.9 °F (36.6 °C)  Pulse:  [72-86] 83  Resp:  [18-20] 18  SpO2:  [94 %-97 %] 96 %  BP: (142-175)/(80-96) 142/83     Weight: 98 kg (216 lb 0.8 oz)  Body mass index is 30.13 kg/m².    Physical Exam   Constitutional: He is oriented to person, place, and time.   HENT:   Head: Normocephalic.   Eyes: Right eye exhibits no discharge. Left eye exhibits no discharge.   Neck: Normal range of motion.   Cardiovascular: Regular rhythm.   Pulmonary/Chest: Breath sounds normal.   Abdominal: Bowel sounds are normal.   Musculoskeletal: He exhibits no edema.   Neurological: He is oriented to person, place, and time. He has normal strength. He has a normal Finger-Nose-Finger Test and a normal Heel to  Shin Test.   Skin: He is not diaphoretic.   Psychiatric: His speech is normal.       NEUROLOGICAL EXAMINATION:     MENTAL STATUS   Oriented to person, place, and time.   Speech: speech is normal   Level of consciousness: alert    CRANIAL NERVES     CN III, IV, VI   Right pupil: Size: 2 mm. Shape: regular. Consensual response: intact.   Left pupil: Size: 2 mm. Shape: regular. Consensual response: intact.   Nystagmus: none   Ophthalmoparesis: none  Conjugate gaze: present    CN VII   Right facial weakness: none  Left facial weakness: none    CN IX, X   Palate: symmetric    CN XI   Right trapezius strength: normal  Left trapezius strength: normal    CN XII   Tongue deviation: none    MOTOR EXAM     Strength   Strength 5/5 throughout.        Finger tapping decreased on left hand     SENSORY EXAM        Light touch decreased on left side     GAIT AND COORDINATION      Coordination   Finger to nose coordination: normal  Heel to shin coordination: normal      Significant Labs:   CBC:   Recent Labs   Lab 03/11/19  1210 03/12/19  0440   WBC 11.88 12.35   HGB 14.2 14.2   HCT 43.3 43.6    331     CMP:   Recent Labs   Lab 03/11/19  1210 03/12/19  0440   * 94    140   K 3.8 3.8    106   CO2 24 26   BUN 17 15   CREATININE 1.3 1.1   CALCIUM 10.1 9.5   MG  --  2.2   PROT 8.0 7.5   ALBUMIN 4.1 3.9   BILITOT 0.4 0.7   ALKPHOS 128 95   AST 18 15   ALT 27 21   ANIONGAP 11 8   EGFRNONAA >60 >60     LIPIDS/LFTS:  Recent Labs   Lab 03/11/19  1210   CHOLESTEROL 195   TRIGLYCERIDES 183 H   HDL 41   LDL CHOLESTEROL 117.4   NON-HDL CHOLESTEROL 154         Significant Imaging:  MRI brain    Impression       Small focus of acute infarction in the right thalamus.  No evidence of hemorrhagic conversion.    This report was flagged in Epic as abnormal.      Electronically signed by: Mike Carr MD  Date: 03/11/2019  Time: 18:54         Assessment and Plan:     54 y/o male with acute stroke    1. Stroke: right thalamic  stroke with resulting contralateral sensory deficits.   This could be due to uncontrolled hypertension. No significant stenosis of vessels.    -Permissive HTN for now. Treat if > 220/110.   -ASA and statin.   -PT/OT.          Active Diagnoses:    Diagnosis Date Noted POA    PRINCIPAL PROBLEM:  CVA (cerebral vascular accident) [I63.9] 03/11/2019 Yes    Acute left-sided weakness [M62.89] 03/11/2019 Yes    Left sided numbness [R20.0] 03/11/2019 Yes    Hyperlipidemia [E78.5] 03/11/2019 Yes    Essential hypertension [I10] 04/09/2013 Yes    HIV (human immunodeficiency virus infection) [B20] 04/09/2013 Yes    Diabetes mellitus type II, uncontrolled [E11.65] 04/09/2013 Yes      Problems Resolved During this Admission:       VTE Risk Mitigation (From admission, onward)        Ordered     IP VTE HIGH RISK PATIENT  Once      03/11/19 1519     Place sequential compression device  Until discontinued      03/11/19 1519     Place JOSE E hose  Until discontinued      03/11/19 1519          Thank you for your consult. I will follow-up with patient. Please contact us if you have any additional questions.    Mayur Guzman MD  Neurology  [facility]

## 2019-03-12 NOTE — PT/OT/SLP EVAL
Physical Therapy Evaluation and Discharge Note    Patient Name:  Lore Shrestha   MRN:  [id number]    Recommendations:     Discharge Recommendations:  (outpatient OT)   Discharge Equipment Recommendations: none   Barriers to discharge: None    Assessment:     Lore Shrestha is a 53 y.o. male admitted with a medical diagnosis of CVA (cerebral vascular accident). At this time, patient is functioning at their prior level of function and does not require further acute PT services. He is okay to ambulate in hallway with nursing staff supervision.     Recent Surgery: * No surgery found *      Plan:     During this hospitalization, patient does not require further acute PT services.  Please re-consult if situation changes.      Subjective     Chief Complaint: Headache. Numbness to left side of face.   Patient/Family Comments/goals: To get back to PLOF.   Pain/Comfort:  · Pain Rating 1: 9/10  · Location 1: (headache)  · Pain Addressed 1: Pre-medicate for activity(nurse Max brought pain medication at beginning of PT evaluation )    Living Environment:  Patient lives alone in a 3rd floor apartment. There is an elevator present at his complex. He works at [employer] and drives.Prior to admission, patients level of function was independent. He went to the orthopedic last week for L shoulder tendinitis. Equipment used at home: none. Upon discharge, patient will have assistance from none stated.    Objective:     Communicated with nurse Max prior to session.  Patient found supine in bed upon PT entry to room found with: telemetry, peripheral IV     General Precautions: Standard, fall, diabetic   Orthopedic Precautions:N/A   Braces: N/A     Exams:  · Cognitive Exam:  Patient is oriented to Person, Place, Time and Situation  · Gross Motor Coordination:  WFL  · Sensation:  Patient reported increased sensitivity to bottom of feet while ambulating in hallway in socks.  · Skin Integrity/Edema:      · -       Skin integrity: Visible skin  intact  · RLE ROM: WFL  · RLE Strength: 5/5  · LLE ROM: WFL  · LLE Strength: 5/5    Functional Mobility:  · Bed Mobility:     · Supine to Sit: stand by assistance  · Transfers:     · Sit to Stand:  stand by assistance with no AD  · Gait:  Patient ambulated 100ft with No Assistive Device and SBA. No loss of balance noted. He was limited in ambulation distance due to left calf cramping. Nurse Winter notified.     AM-PAC 6 CLICK MOBILITY  Total Score:24     Patient left up in chair with all lines intact, call button in reach and nurse Winter notified.    GOALS:   Multidisciplinary Problems     Physical Therapy Goals     Not on file          Multidisciplinary Problems (Resolved)        Problem: Physical Therapy Goal    Goal Priority Disciplines Outcome Goal Variances Interventions   Physical Therapy Goal   (Resolved)     PT, PT/OT Outcome(s) achieved                     History:     Past Medical History:   Diagnosis Date    Diabetes mellitus     HIV (human immunodeficiency virus infection)     Hypertension        Past Surgical History:   Procedure Laterality Date    NO PAST SURGERIES  03/11/2019       Time Tracking:     PT Received On: 03/12/19  PT Start Time: 1213     PT Stop Time: 1231  PT Total Time (min): 18 min     Billable Minutes: Evaluation  18 with OT      Tawana Hernández, PT  03/12/2019

## 2019-03-12 NOTE — NURSING
Pt c/o headache, no signs of distress noted. Dr. Wallis notified new orders in place, will continue to monitor. MD stated ok to resume diet.

## 2019-03-12 NOTE — PLAN OF CARE
Problem: Adult Inpatient Plan of Care  Goal: Plan of Care Review  Outcome: Ongoing (interventions implemented as appropriate)   03/12/19 1443   Plan of Care Review   Plan of Care Reviewed With patient       Problem: Diabetes Comorbidity  Goal: Blood Glucose Level Within Desired Range  Outcome: Ongoing (interventions implemented as appropriate)  Intervention: Maintain Glycemic Control   03/12/19 1443   Monitor and Manage Ketoacidosis   Glycemic Management blood glucose monitoring

## 2019-03-12 NOTE — ASSESSMENT & PLAN NOTE
Acute onset left sided weakness and numbness today at 10:30am today. He also reports numbness of left arm, however on exam he has tenderness of left bicep tendon and pain with flexion of left shoulder. CT head without acute abnormality.   MRI  Echo  Neurology consulted  PT/OT/SLP  Neuro checks, aspiration precautions  Will provide IVF as patient failed nursing swallow screen downstairs. Repeat pending.   MRI show,Small focus of acute infarction in the right thalamus.  No evidence of hemorrhagic conversion.PT,OT,ST has juana consulted,neurology as well.  On ASA,statin,check also carotid doppler,echo,MRA brain,

## 2019-03-13 LAB
ALBUMIN SERPL BCP-MCNC: 3.6 G/DL
ALP SERPL-CCNC: 87 U/L
ALT SERPL W/O P-5'-P-CCNC: 21 U/L
ANION GAP SERPL CALC-SCNC: 4 MMOL/L
AST SERPL-CCNC: 15 U/L
BASOPHILS # BLD AUTO: 0.03 K/UL
BASOPHILS NFR BLD: 0.3 %
BILIRUB SERPL-MCNC: 0.5 MG/DL
BUN SERPL-MCNC: 14 MG/DL
CALCIUM SERPL-MCNC: 9.1 MG/DL
CHLORIDE SERPL-SCNC: 105 MMOL/L
CO2 SERPL-SCNC: 27 MMOL/L
CREAT SERPL-MCNC: 1.1 MG/DL
DIFFERENTIAL METHOD: ABNORMAL
EOSINOPHIL # BLD AUTO: 0.1 K/UL
EOSINOPHIL NFR BLD: 1.1 %
ERYTHROCYTE [DISTWIDTH] IN BLOOD BY AUTOMATED COUNT: 13.2 %
EST. GFR  (AFRICAN AMERICAN): >60 ML/MIN/1.73 M^2
EST. GFR  (NON AFRICAN AMERICAN): >60 ML/MIN/1.73 M^2
GLUCOSE SERPL-MCNC: 193 MG/DL
HCT VFR BLD AUTO: 40.3 %
HGB BLD-MCNC: 13.3 G/DL
LYMPHOCYTES # BLD AUTO: 2.6 K/UL
LYMPHOCYTES NFR BLD: 25.5 %
MCH RBC QN AUTO: 28.4 PG
MCHC RBC AUTO-ENTMCNC: 33 G/DL
MCV RBC AUTO: 86 FL
MONOCYTES # BLD AUTO: 0.8 K/UL
MONOCYTES NFR BLD: 7.5 %
NEUTROPHILS # BLD AUTO: 6.6 K/UL
NEUTROPHILS NFR BLD: 65.6 %
PLATELET # BLD AUTO: 289 K/UL
PMV BLD AUTO: 9.8 FL
POCT GLUCOSE: 188 MG/DL (ref 70–110)
POCT GLUCOSE: 207 MG/DL (ref 70–110)
POCT GLUCOSE: 214 MG/DL (ref 70–110)
POCT GLUCOSE: 285 MG/DL (ref 70–110)
POTASSIUM SERPL-SCNC: 4.3 MMOL/L
PROT SERPL-MCNC: 6.9 G/DL
RBC # BLD AUTO: 4.69 M/UL
SODIUM SERPL-SCNC: 136 MMOL/L
WBC # BLD AUTO: 10.01 K/UL

## 2019-03-13 PROCEDURE — 97110 THERAPEUTIC EXERCISES: CPT

## 2019-03-13 PROCEDURE — 92526 ORAL FUNCTION THERAPY: CPT

## 2019-03-13 PROCEDURE — 36415 COLL VENOUS BLD VENIPUNCTURE: CPT

## 2019-03-13 PROCEDURE — 85025 COMPLETE CBC W/AUTO DIFF WBC: CPT

## 2019-03-13 PROCEDURE — 25000003 PHARM REV CODE 250: Performed by: PHYSICIAN ASSISTANT

## 2019-03-13 PROCEDURE — 21400001 HC TELEMETRY ROOM

## 2019-03-13 PROCEDURE — 80053 COMPREHEN METABOLIC PANEL: CPT

## 2019-03-13 PROCEDURE — 25000003 PHARM REV CODE 250: Performed by: HOSPITALIST

## 2019-03-13 PROCEDURE — 25000003 PHARM REV CODE 250: Performed by: INTERNAL MEDICINE

## 2019-03-13 RX ORDER — LISINOPRIL 20 MG/1
20 TABLET ORAL DAILY
Status: DISCONTINUED | OUTPATIENT
Start: 2019-03-13 | End: 2019-03-14

## 2019-03-13 RX ORDER — DIPHENHYDRAMINE HCL 25 MG
25 CAPSULE ORAL EVERY 6 HOURS PRN
Status: DISCONTINUED | OUTPATIENT
Start: 2019-03-13 | End: 2019-03-14 | Stop reason: HOSPADM

## 2019-03-13 RX ORDER — HYDROCHLOROTHIAZIDE 25 MG/1
25 TABLET ORAL DAILY
Status: DISCONTINUED | OUTPATIENT
Start: 2019-03-13 | End: 2019-03-14

## 2019-03-13 RX ADMIN — SODIUM CHLORIDE: 0.9 INJECTION, SOLUTION INTRAVENOUS at 12:03

## 2019-03-13 RX ADMIN — LISINOPRIL 20 MG: 20 TABLET ORAL at 11:03

## 2019-03-13 RX ADMIN — INSULIN ASPART 2 UNITS: 100 INJECTION, SOLUTION INTRAVENOUS; SUBCUTANEOUS at 08:03

## 2019-03-13 RX ADMIN — INSULIN ASPART 6 UNITS: 100 INJECTION, SOLUTION INTRAVENOUS; SUBCUTANEOUS at 06:03

## 2019-03-13 RX ADMIN — DIPHENHYDRAMINE HYDROCHLORIDE 25 MG: 25 CAPSULE ORAL at 11:03

## 2019-03-13 RX ADMIN — INSULIN ASPART 2 UNITS: 100 INJECTION, SOLUTION INTRAVENOUS; SUBCUTANEOUS at 12:03

## 2019-03-13 RX ADMIN — ASPIRIN 325 MG ORAL TABLET 325 MG: 325 PILL ORAL at 08:03

## 2019-03-13 RX ADMIN — HYDROCHLOROTHIAZIDE 25 MG: 25 TABLET ORAL at 11:03

## 2019-03-13 RX ADMIN — ATORVASTATIN CALCIUM 40 MG: 40 TABLET, FILM COATED ORAL at 08:03

## 2019-03-13 RX ADMIN — INSULIN ASPART 2 UNITS: 100 INJECTION, SOLUTION INTRAVENOUS; SUBCUTANEOUS at 11:03

## 2019-03-13 NOTE — PROGRESS NOTES
[facility] Hospital Medicine  Progress Note    Patient Name: Lore Shrestha  MRN: [id number]  Patient Class: IP- Inpatient   Admission Date: 3/11/2019  Length of Stay: 2 days  Attending Physician: Patricia Boyle MD  Primary Care Provider: Helen Spencer NP        Subjective:     Principal Problem:CVA (cerebral vascular accident)    HPI:  Lore Shrestha 53 y.o. male with HTN, HLD, HIV, and DM2 presents to the hospital with a chief complaint of left sided weakness and numbness which began suddenly at 10:30am. He reports the numbness on his left side has been constant since that time and is still present now. He denies any recent head trauma or falls. He also reports he feels as though his left arm is weaker. He denies any slurred speech, difficulty swallowing, or facial droop. His sister reports she felt as though he was slurring his speech this morning. He denies any weakness or altered sensation to his lower extremities. His last CD4 count was greater than 500 and viral load undetectable he follows with [facility] for treatment. He has juana receiving treatment with orthopedics for left shoulder tendinitis. He reports his left shoulder feels as though it is stiff. He reports on 3/6 he received an injection in his left shoulder. He endorses left sided weakness and numbness. He denies fever, chest pain, SOB, N/V/D, abdominal pain, dysuria, syncope, and head trauma.     In the ED, vascular neuro consulted and recommended admission for MRI, CT head without acute abnormality, chest x-ray without acute process, and EKG of NSR.     Hospital Course:  Lore Shrestha 53 y.o. male placed in observation for left sided weakness. In the ED, vascular neuro consulted and recommended admission for MRI, CT head without acute abnormality, chest x-ray without acute process, and EKG of NSR. MRI show,Small focus of acute infarction in the right thalamus.  No evidence of hemorrhagic conversion.PT,OT,ST has been  consulted,neurology as well.  On ASA,statin. carotid doppler,echo,MRA brain,unemarkable.  Was on permissive HTN.started on BP med;s,still has some left fane and arm numbness,    Past Medical History:   Diagnosis Date    Diabetes mellitus     HIV (human immunodeficiency virus infection)     Hypertension        Past Surgical History:   Procedure Laterality Date    NO PAST SURGERIES  03/11/2019       Review of patient's allergies indicates:  No Known Allergies    No current facility-administered medications on file prior to encounter.      Current Outpatient Medications on File Prior to Encounter   Medication Sig    aspirin 81 MG Chew Take 1 tablet (81 mg total) by mouth once daily.    atorvastatin (LIPITOR) 40 MG tablet Take 40 mg by mouth once daily.    etravirine (INTELENCE) 100 mg Tab Take 200 mg by mouth 2 (two) times daily with meals.    hydrochlorothiazide (HYDRODIURIL) 25 MG tablet Take 1 tablet (25 mg total) by mouth once daily.    insulin lispro protamine-insulin lispro (HUMALOG 75/25) 100 unit/mL (75-25) Susp Inject 100 Units into the skin 2 (two) times daily before meals.    lisinopril (PRINIVIL,ZESTRIL) 20 MG tablet Take 20 mg by mouth once daily.    metformin (GLUCOPHAGE) 500 MG tablet Take 500 mg by mouth 2 (two) times daily with meals.    amitriptyline (ELAVIL) 25 MG tablet Take 25 mg by mouth nightly as needed.    darunavir (PREZISTA) 400 MG Tab Take by mouth.    ibuprofen (ADVIL,MOTRIN) 600 MG tablet Take 1 tablet (600 mg total) by mouth 3 (three) times daily.    ritonavir (NORVIR) 100 mg Cap Take 600 mg by mouth 2 (two) times daily.    tenofovir (VIREAD) 300 mg Tab Take 300 mg by mouth once daily.     Family History     None        Tobacco Use    Smoking status: Never Smoker    Smokeless tobacco: Never Used   Substance and Sexual Activity    Alcohol use: No    Drug use: No    Sexual activity: Not on file     Review of Systems   Constitutional: Negative for chills and fever.    HENT: Negative for nosebleeds and tinnitus.    Eyes: Negative for photophobia and visual disturbance.   Respiratory: Negative for shortness of breath and wheezing.    Cardiovascular: Negative for chest pain, palpitations and leg swelling.   Gastrointestinal: Negative for abdominal distention, nausea and vomiting.   Genitourinary: Negative for dysuria, flank pain and hematuria.   Musculoskeletal: Positive for arthralgias (left shoulder). Negative for gait problem and joint swelling.   Skin: Negative for rash and wound.   Neurological: Positive for weakness and numbness. Negative for seizures, syncope and facial asymmetry.     Objective:     Vital Signs (Most Recent):  Temp: 98 °F (36.7 °C) (03/13/19 0719)  Pulse: 81 (03/13/19 0719)  Resp: 18 (03/13/19 0719)  BP: (!) 151/88 (03/13/19 0719)  SpO2: 97 % (03/13/19 0719) Vital Signs (24h Range):  Temp:  [97.8 °F (36.6 °C)-98.3 °F (36.8 °C)] 98 °F (36.7 °C)  Pulse:  [79-90] 81  Resp:  [18-20] 18  SpO2:  [94 %-97 %] 97 %  BP: (141-152)/(83-88) 151/88     Weight: 96.1 kg (211 lb 13.8 oz)  Body mass index is 29.55 kg/m².    Physical Exam   Constitutional: He is oriented to person, place, and time. He appears well-developed and well-nourished. No distress.   HENT:   Head: Normocephalic and atraumatic.   Right Ear: External ear normal.   Left Ear: External ear normal.   Eyes: Conjunctivae and EOM are normal. Right eye exhibits no discharge. Left eye exhibits no discharge.   Neck: Normal range of motion. No thyromegaly present.   Cardiovascular: Normal rate and regular rhythm.   No murmur heard.  Pulmonary/Chest: Effort normal and breath sounds normal. No respiratory distress.   Abdominal: Soft. Bowel sounds are normal. He exhibits no distension and no mass. There is no tenderness.   Musculoskeletal: He exhibits tenderness. He exhibits no edema or deformity.   At left bicep extending into shoulder. Patient reports pain with left shoulder flexion.   Neurological: He is alert  and oriented to person, place, and time. A sensory deficit (reports decreased sensation to left side) is present. He exhibits normal muscle tone.   5/5 strength on RUE with finger strength and elbow flexion. LUE 4/5 strength with  strength, elbow flexion and shoulder abduction. Pain with shoulder flexion. Finger to nose intact bilaterally, heel to shin intact bilaterally, thumb to finger intact bilaterally, negative romberg.   Intermittently raises both eyebrows, able to furrow both eyebrows.    Skin: Skin is warm and dry.   Psychiatric: He has a normal mood and affect. His behavior is normal.   Nursing note and vitals reviewed.        CRANIAL NERVES     CN III, IV, VI   Extraocular motions are normal.        Significant Labs:   CBC:   Recent Labs   Lab 03/11/19  1210 03/12/19  0440 03/13/19  0602   WBC 11.88 12.35 10.01   HGB 14.2 14.2 13.3*   HCT 43.3 43.6 40.3    331 289     CMP:   Recent Labs   Lab 03/11/19  1210 03/12/19  0440 03/13/19  0602    140 136   K 3.8 3.8 4.3    106 105   CO2 24 26 27   * 94 193*   BUN 17 15 14   CREATININE 1.3 1.1 1.1   CALCIUM 10.1 9.5 9.1   PROT 8.0 7.5 6.9   ALBUMIN 4.1 3.9 3.6   BILITOT 0.4 0.7 0.5   ALKPHOS 128 95 87   AST 18 15 15   ALT 27 21 21   ANIONGAP 11 8 4*   EGFRNONAA >60 >60 >60     Lipid Panel:   Recent Labs   Lab 03/11/19  1210   CHOL 195   HDL 41   LDLCALC 117.4   TRIG 183*   CHOLHDL 21.0     TSH:   Recent Labs   Lab 03/11/19  1210   TSH 0.611       Significant Imaging:   Imaging Results          MRI Brain Without Contrast (Final result)     Abnormal  Result time 03/11/19 18:54:49    Final result by Mike Carr MD (03/11/19 18:54:49)                 Impression:      Small focus of acute infarction in the right thalamus.  No evidence of hemorrhagic conversion.    This report was flagged in Epic as abnormal.      Electronically signed by: Mike Carr MD  Date:    03/11/2019  Time:    18:54             Narrative:    EXAMINATION:  MRI  BRAIN WITHOUT CONTRAST    CLINICAL HISTORY:  left sided numbness; Weakness    TECHNIQUE:  Multiplanar multisequence MR imaging of the brain was performed without contrast.  Evaluation of the anterior portions of the brain is limited due to streak artifact from possible dental hardware.    COMPARISON:  CT scan of the head dated 03/11/2019.    FINDINGS:  The craniocervical junction is unremarkable.  The intracranial flow voids are within normal limits.    There is a small focus of diffusion restriction in the right thalamus.  There is associated T2/FLAIR signal hyperintensity corresponding to the region of diffusion restriction.  There are two foci of subcentimeter T2/FLAIR signal hyperintensity in the anterior periventricular white matter.  The ventricles and sulci are otherwise unremarkable.  There are no extra-axial fluid collections.  There is no evidence of intracranial hemorrhage.  There is no evidence of mass effect.    The orbits and intraorbital contents are unremarkable.  The paranasal sinuses and mastoid air cells are clear.  The calvarium is intact.                               X-Ray Chest AP Portable (Final result)  Result time 03/11/19 13:03:33    Final result by Elias Handley MD (03/11/19 13:03:33)                 Impression:      1. No acute cardiopulmonary process, hypoventilatory exam.      Electronically signed by: Elias Handley MD  Date:    03/11/2019  Time:    13:03             Narrative:    EXAMINATION:  XR CHEST AP PORTABLE    CLINICAL HISTORY:  Stroke;    TECHNIQUE:  Single frontal view of the chest was performed.    COMPARISON:  04/08/2013    FINDINGS:  The cardiomediastinal silhouette is not enlarged, magnified by technique..  There is no pleural effusion.  The trachea is midline.  The lungs are symmetrically expanded bilaterally without evidence of acute parenchymal process. No large focal consolidation seen.  There is no pneumothorax.  The osseous structures are remarkable for  degenerative changes..                               CT Head Without Contrast (Final result)  Result time 03/11/19 12:19:19    Final result by Zach Ackerman MD (03/11/19 12:19:19)                 Impression:      No acute intracranial abnormalities are identified.  No evidence for intracranial hemorrhage.      Electronically signed by: Zach Ackerman MD  Date:    03/11/2019  Time:    12:19             Narrative:    EXAMINATION:  CT HEAD WITHOUT CONTRAST    CLINICAL HISTORY:  Stroke;left sided weakness;    TECHNIQUE:  Low dose axial images were obtained through the head.  Coronal and sagittal reformations were also performed. Contrast was not administered.    COMPARISON:  None.    FINDINGS:  The ventricles are normal in size and configuration.  There is normal gray-white matter differentiation maintained.  There is no evidence for abnormal masses, mass effect, or midline shift.  No abnormal intra or extra-axial fluid collections are identified, specifically there is no evidence for intracranial hemorrhage.  Mastoid air cells and visualized paranasal sinuses are clear.  Bony calvarium is intact.                                  Assessment/Plan:      * CVA (cerebral vascular accident)    Acute onset left sided weakness and numbness today at 10:30am today. He also reports numbness of left arm, however on exam he has tenderness of left bicep tendon and pain with flexion of left shoulder. CT head without acute abnormality.   MRI  Echo  Neurology consulted  PT/OT/SLP  Neuro checks, aspiration precautions  Will provide IVF as patient failed nursing swallow screen downstairs. Repeat pending.   MRI show,Small focus of acute infarction in the right thalamus.  No evidence of hemorrhagic conversion.PT,OT,ST has juana consulted,neurology as well.  On ASA,statin.carotid doppler,echo,MRA brain,unemarkable     Hyperlipidemia    Will continue home statin when able to tolerate PO.       Left sided numbness    See above       Acute  left-sided weakness    Due to CVA     Essential hypertension    Was on permissive HTN,started on BP med;s.       Diabetes mellitus type II, uncontrolled    Hold home oral hypoglycemics. Sliding scale insulin. Hypoglycemic protocol. POCT glucose checks. Patient failed swallow study in ED.      HIV (human immunodeficiency virus infection)    Will continue home intelence, prerzista, and norvir when tolerating PO. Failed swallow screen in the ED.        VTE Risk Mitigation (From admission, onward)        Ordered     IP VTE HIGH RISK PATIENT  Once      03/11/19 1519     Place sequential compression device  Until discontinued      03/11/19 1519     Place JOSE E hose  Until discontinued      03/11/19 1519              Patricia Boyle MD  Department of Hospital Medicine   [facility]

## 2019-03-13 NOTE — PLAN OF CARE
"TN met with pt at bedside. TN explained her role in Care Management. Pt voiced understanding. TN inquired about HELP AT HOME. Pt stated that he will have Theresa at home to help for support. TN voiced understanding. TN inquired about responsibilities when it comes to  MANAGING HIS HEALTH at home and what it entails. Pt inquired about details. TN informed pt of RESPONSIBILITIES of:    1. Follow up appointments  2. Getting Prescriptions filled  3. Taking medications as prescribed.     Pt voiced understanding.  TN explained "My Health Packet" blue folder and the pink and green tabs that are on the folder as well.  Pt voiced understanding.     Pt's pharmacy:   [facility] -- [facility], [place] - [address], [address]  [address], [address]  [facility] [address]  Phone: [phone] Fax: [phone]    Pt's preference for appointments: mornings       03/13/19 1438   Discharge Assessment   Assessment Type Discharge Planning Assessment   Confirmed/corrected address and phone number on facesheet? Yes   Assessment information obtained from? Patient   Expected Length of Stay (days) 3   Communicated expected length of stay with patient/caregiver yes   Prior to hospitilization cognitive status: Alert/Oriented   Prior to hospitalization functional status: Independent   Current cognitive status: Alert/Oriented   Current Functional Status: Independent   Lives With alone   Able to Return to Prior Arrangements yes   Is patient able to care for self after discharge? Yes   Who are your caregiver(s) and their phone number(s)? (Theresa Shrestha, Sister, [phone] )   Patient's perception of discharge disposition home or selfcare   Readmission Within the Last 30 Days no previous admission in last 30 days   Patient currently being followed by outpatient case management? No   Patient currently receives any other outside agency services? No   Equipment Currently Used at Home none   Do you have any problems " affording any of your prescribed medications? No   Is the patient taking medications as prescribed? yes   Does the patient have transportation home? Yes   Transportation Anticipated family or friend will provide   Does the patient receive services at the [facility]madin Clinic? No   DME Needed Upon Discharge  none   Patient/Family in Agreement with Plan yes

## 2019-03-13 NOTE — PLAN OF CARE
Problem: Occupational Therapy Goal  Goal: Occupational Therapy Goal  Goals to be met by: 3/26/19    Patient will increase functional independence with ADLs by performing:    UE Dressing with Modified [place].  LE Dressing with Modified [place].  Grooming while standing at sink with Modified [place].  Supine to sit with Modified [place].  Toilet transfer to toilet with Modified Indepen dence.  Upper extremity exercise program x15 reps per handout, with independence.     Outcome: Ongoing (interventions implemented as appropriate)  The patient was educated re: HEP ussing medium resistance theraputty. The patient was able to perform theraputty exercise per handout, while seated on the EOB.     Comments: The patient was given written HEP and theraputty for home use. The patient will benefit from Out Patient OT.

## 2019-03-13 NOTE — PLAN OF CARE
Problem: SLP Goal  Goal: SLP Goal  ST. Pt will participate in swallow eval to determine least restrictive diet with no overt s/s of aspiration.-GOAL MET 3/12  2. Pt will tolerate PO diet of dental soft and thin liquids with no overt s/s of aspiration over 1-2 sessions.   Outcome: Outcome(s) achieved Date Met: 19  Patient tolerating soft diet with thin liquids well; no further ST services needed

## 2019-03-13 NOTE — PT/OT/SLP PROGRESS
Occupational Therapy   Treatment    Name: Lore Shrestha  MRN: [id number]  Admitting Diagnosis:  CVA (cerebral vascular accident)       Recommendations:     Discharge Recommendations: (Out patient OT)  Discharge Equipment Recommendations:  none  Barriers to discharge:  Decreased caregiver support, Inaccessible home environment    Assessment:     Lore Shrestha is a 53 y.o. male with a medical diagnosis of CVA (cerebral vascular accident). The patient tolerated UE therex using medium resistance theraputty x20 min. . Performance deficits affecting function are weakness, impaired endurance, impaired self care skills, impaired sensation, impaired functional mobilty, decreased upper extremity function, impaired balance.     Rehab Prognosis:  Good; patient would benefit from acute skilled OT services to address these deficits and reach maximum level of function.       Plan:     Patient to be seen 3 x/week to address the above listed problems via self-care/home management, therapeutic activities, therapeutic exercises  · Plan of Care Expires: 03/26/19  · Plan of Care Reviewed with: patient    Subjective     Pain/Comfort:  · Pain Rating 1: 0/10    Objective:       Patient found HOB elevated with telemetry upon OT entry to room.    General Precautions: Standard, fall, diabetic   Orthopedic Precautions:N/A   Braces: N/A     Occupational Performance:     Bed Mobility:    · Patient completed Scooting/Bridging with modified independence  · Patient completed Supine to Sit with modified independence  · Patient completed Sit to Supine with modified independence     Functional Mobility/Transfers:  · Functional Mobility: The patient tolerated sitting on the EOB ~25 min for UE therex.    Activities of Daily Living:  · N/T      AMPA 6 Click ADL: 23    Treatment & Education:  The patient was educated re: HEP ussing medium resistance theraputty. The patient was able to perform theraputty exercise per handout, while seated on the EOB. The  patient was given written HEP and theraputty for home use.            Patient left HOB elevated with all lines intact and call button in reachEducation:      GOALS:   Multidisciplinary Problems     Occupational Therapy Goals        Problem: Occupational Therapy Goal    Goal Priority Disciplines Outcome Interventions   Occupational Therapy Goal     OT, PT/OT Ongoing (interventions implemented as appropriate)    Description:  Goals to be met by: 3/26/19    Patient will increase functional independence with ADLs by performing:    UE Dressing with Modified [place].  LE Dressing with Modified [place].  Grooming while standing at sink with Modified [place].  Supine to sit with Modified [place].  Toilet transfer to toilet with Modified Indepen dence.  Upper extremity exercise program x15 reps per handout, with independence.                      Time Tracking:     OT Date of Treatment: 03/13/19  OT Start Time: 1201  OT Stop Time: 1227  OT Total Time (min): 26 min    Billable Minutes:Therapeutic Exercise 26    Jannette Charles OT  3/13/2019

## 2019-03-13 NOTE — PT/OT/SLP PROGRESS
Speech Language Pathology Treatment    Patient Name:  Lore Shrestha   MRN:  [id number]  Admitting Diagnosis: CVA (cerebral vascular accident)    Recommendations:                 General Recommendations:  Follow-up not indicated  Diet recommendations:  Mechanical soft, Liquid Diet Level: Thin   Aspiration Precautions: Standard aspiration precautions   General Precautions: Standard, aspiration  Communication strategies:  none    Subjective     Patient seen at bedside; alert and responsive  Patient goals: n/a     Pain/Comfort:  ·      Objective:     Has the patient been evaluated by SLP for swallowing?   Yes  Keep patient NPO? No   Current Respiratory Status: room air      Patient tolerating soft diet with thin liquids well.  No further ST services indicated.    Assessment:     Lore hSrestha is a 53 y.o. male with an SLP diagnosis of Dysphagia.  He presents with functional oral motor and pharyngeal stage swallow skills and functional language and cognition.    Goals:   Multidisciplinary Problems     SLP Goals     Not on file          Multidisciplinary Problems (Resolved)        Problem: SLP Goal    Goal Priority Disciplines Outcome   SLP Goal   (Resolved)    Low SLP Outcome(s) achieved   Description:  ST. Pt will participate in swallow eval to determine least restrictive diet with no overt s/s of aspiration.-GOAL MET 3/12  2. Pt will tolerate PO diet of dental soft and thin liquids with no overt s/s of aspiration over 1-2 sessions.                    Plan:     · Patient to be seen:  2 x/week   · Plan of Care expires:  19  · Plan of Care reviewed with:  patient   · SLP Follow-Up:  No       Discharge recommendations:  (TBD)   Barriers to Discharge:  None    Time Tracking:     SLP Treatment Date:   19  Speech Start Time:  1020  Speech Stop Time:  1030     Speech Total Time (min):  10 min    Billable Minutes: Treatment Swallowing Dysfunction 10 minutes    Destiny Saenz CCC-SLP  2019

## 2019-03-13 NOTE — ASSESSMENT & PLAN NOTE
Acute onset left sided weakness and numbness today at 10:30am today. He also reports numbness of left arm, however on exam he has tenderness of left bicep tendon and pain with flexion of left shoulder. CT head without acute abnormality.   MRI  Echo  Neurology consulted  PT/OT/SLP  Neuro checks, aspiration precautions  Will provide IVF as patient failed nursing swallow screen downstairs. Repeat pending.   MRI show,Small focus of acute infarction in the right thalamus.  No evidence of hemorrhagic conversion.PT,OT,ST has juana consulted,neurology as well.  On ASA,statin.carotid doppler,echo,MRA brain,unemarkable

## 2019-03-13 NOTE — SUBJECTIVE & OBJECTIVE
Past Medical History:   Diagnosis Date    Diabetes mellitus     HIV (human immunodeficiency virus infection)     Hypertension        Past Surgical History:   Procedure Laterality Date    NO PAST SURGERIES  03/11/2019       Review of patient's allergies indicates:  No Known Allergies    No current facility-administered medications on file prior to encounter.      Current Outpatient Medications on File Prior to Encounter   Medication Sig    aspirin 81 MG Chew Take 1 tablet (81 mg total) by mouth once daily.    atorvastatin (LIPITOR) 40 MG tablet Take 40 mg by mouth once daily.    etravirine (INTELENCE) 100 mg Tab Take 200 mg by mouth 2 (two) times daily with meals.    hydrochlorothiazide (HYDRODIURIL) 25 MG tablet Take 1 tablet (25 mg total) by mouth once daily.    insulin lispro protamine-insulin lispro (HUMALOG 75/25) 100 unit/mL (75-25) Susp Inject 100 Units into the skin 2 (two) times daily before meals.    lisinopril (PRINIVIL,ZESTRIL) 20 MG tablet Take 20 mg by mouth once daily.    metformin (GLUCOPHAGE) 500 MG tablet Take 500 mg by mouth 2 (two) times daily with meals.    amitriptyline (ELAVIL) 25 MG tablet Take 25 mg by mouth nightly as needed.    darunavir (PREZISTA) 400 MG Tab Take by mouth.    ibuprofen (ADVIL,MOTRIN) 600 MG tablet Take 1 tablet (600 mg total) by mouth 3 (three) times daily.    ritonavir (NORVIR) 100 mg Cap Take 600 mg by mouth 2 (two) times daily.    tenofovir (VIREAD) 300 mg Tab Take 300 mg by mouth once daily.     Family History     None        Tobacco Use    Smoking status: Never Smoker    Smokeless tobacco: Never Used   Substance and Sexual Activity    Alcohol use: No    Drug use: No    Sexual activity: Not on file     Review of Systems   Constitutional: Negative for chills and fever.   HENT: Negative for nosebleeds and tinnitus.    Eyes: Negative for photophobia and visual disturbance.   Respiratory: Negative for shortness of breath and wheezing.     Cardiovascular: Negative for chest pain, palpitations and leg swelling.   Gastrointestinal: Negative for abdominal distention, nausea and vomiting.   Genitourinary: Negative for dysuria, flank pain and hematuria.   Musculoskeletal: Positive for arthralgias (left shoulder). Negative for gait problem and joint swelling.   Skin: Negative for rash and wound.   Neurological: Positive for weakness and numbness. Negative for seizures, syncope and facial asymmetry.     Objective:     Vital Signs (Most Recent):  Temp: 98 °F (36.7 °C) (03/13/19 0719)  Pulse: 81 (03/13/19 0719)  Resp: 18 (03/13/19 0719)  BP: (!) 151/88 (03/13/19 0719)  SpO2: 97 % (03/13/19 0719) Vital Signs (24h Range):  Temp:  [97.8 °F (36.6 °C)-98.3 °F (36.8 °C)] 98 °F (36.7 °C)  Pulse:  [79-90] 81  Resp:  [18-20] 18  SpO2:  [94 %-97 %] 97 %  BP: (141-152)/(83-88) 151/88     Weight: 96.1 kg (211 lb 13.8 oz)  Body mass index is 29.55 kg/m².    Physical Exam   Constitutional: He is oriented to person, place, and time. He appears well-developed and well-nourished. No distress.   HENT:   Head: Normocephalic and atraumatic.   Right Ear: External ear normal.   Left Ear: External ear normal.   Eyes: Conjunctivae and EOM are normal. Right eye exhibits no discharge. Left eye exhibits no discharge.   Neck: Normal range of motion. No thyromegaly present.   Cardiovascular: Normal rate and regular rhythm.   No murmur heard.  Pulmonary/Chest: Effort normal and breath sounds normal. No respiratory distress.   Abdominal: Soft. Bowel sounds are normal. He exhibits no distension and no mass. There is no tenderness.   Musculoskeletal: He exhibits tenderness. He exhibits no edema or deformity.   At left bicep extending into shoulder. Patient reports pain with left shoulder flexion.   Neurological: He is alert and oriented to person, place, and time. A sensory deficit (reports decreased sensation to left side) is present. He exhibits normal muscle tone.   5/5 strength on RUE  with finger strength and elbow flexion. LUE 4/5 strength with  strength, elbow flexion and shoulder abduction. Pain with shoulder flexion. Finger to nose intact bilaterally, heel to shin intact bilaterally, thumb to finger intact bilaterally, negative romberg.   Intermittently raises both eyebrows, able to furrow both eyebrows.    Skin: Skin is warm and dry.   Psychiatric: He has a normal mood and affect. His behavior is normal.   Nursing note and vitals reviewed.        CRANIAL NERVES     CN III, IV, VI   Extraocular motions are normal.        Significant Labs:   CBC:   Recent Labs   Lab 03/11/19  1210 03/12/19  0440 03/13/19  0602   WBC 11.88 12.35 10.01   HGB 14.2 14.2 13.3*   HCT 43.3 43.6 40.3    331 289     CMP:   Recent Labs   Lab 03/11/19  1210 03/12/19  0440 03/13/19  0602    140 136   K 3.8 3.8 4.3    106 105   CO2 24 26 27   * 94 193*   BUN 17 15 14   CREATININE 1.3 1.1 1.1   CALCIUM 10.1 9.5 9.1   PROT 8.0 7.5 6.9   ALBUMIN 4.1 3.9 3.6   BILITOT 0.4 0.7 0.5   ALKPHOS 128 95 87   AST 18 15 15   ALT 27 21 21   ANIONGAP 11 8 4*   EGFRNONAA >60 >60 >60     Lipid Panel:   Recent Labs   Lab 03/11/19  1210   CHOL 195   HDL 41   LDLCALC 117.4   TRIG 183*   CHOLHDL 21.0     TSH:   Recent Labs   Lab 03/11/19  1210   TSH 0.611       Significant Imaging:   Imaging Results          MRI Brain Without Contrast (Final result)     Abnormal  Result time 03/11/19 18:54:49    Final result by Mike Carr MD (03/11/19 18:54:49)                 Impression:      Small focus of acute infarction in the right thalamus.  No evidence of hemorrhagic conversion.    This report was flagged in Epic as abnormal.      Electronically signed by: Mike Carr MD  Date:    03/11/2019  Time:    18:54             Narrative:    EXAMINATION:  MRI BRAIN WITHOUT CONTRAST    CLINICAL HISTORY:  left sided numbness; Weakness    TECHNIQUE:  Multiplanar multisequence MR imaging of the brain was performed without contrast.   Evaluation of the anterior portions of the brain is limited due to streak artifact from possible dental hardware.    COMPARISON:  CT scan of the head dated 03/11/2019.    FINDINGS:  The craniocervical junction is unremarkable.  The intracranial flow voids are within normal limits.    There is a small focus of diffusion restriction in the right thalamus.  There is associated T2/FLAIR signal hyperintensity corresponding to the region of diffusion restriction.  There are two foci of subcentimeter T2/FLAIR signal hyperintensity in the anterior periventricular white matter.  The ventricles and sulci are otherwise unremarkable.  There are no extra-axial fluid collections.  There is no evidence of intracranial hemorrhage.  There is no evidence of mass effect.    The orbits and intraorbital contents are unremarkable.  The paranasal sinuses and mastoid air cells are clear.  The calvarium is intact.                               X-Ray Chest AP Portable (Final result)  Result time 03/11/19 13:03:33    Final result by Elias Handley MD (03/11/19 13:03:33)                 Impression:      1. No acute cardiopulmonary process, hypoventilatory exam.      Electronically signed by: Elias Handley MD  Date:    03/11/2019  Time:    13:03             Narrative:    EXAMINATION:  XR CHEST AP PORTABLE    CLINICAL HISTORY:  Stroke;    TECHNIQUE:  Single frontal view of the chest was performed.    COMPARISON:  04/08/2013    FINDINGS:  The cardiomediastinal silhouette is not enlarged, magnified by technique..  There is no pleural effusion.  The trachea is midline.  The lungs are symmetrically expanded bilaterally without evidence of acute parenchymal process. No large focal consolidation seen.  There is no pneumothorax.  The osseous structures are remarkable for degenerative changes..                               CT Head Without Contrast (Final result)  Result time 03/11/19 12:19:19    Final result by Zach Ackerman MD (03/11/19  12:19:19)                 Impression:      No acute intracranial abnormalities are identified.  No evidence for intracranial hemorrhage.      Electronically signed by: Zach Ackerman MD  Date:    03/11/2019  Time:    12:19             Narrative:    EXAMINATION:  CT HEAD WITHOUT CONTRAST    CLINICAL HISTORY:  Stroke;left sided weakness;    TECHNIQUE:  Low dose axial images were obtained through the head.  Coronal and sagittal reformations were also performed. Contrast was not administered.    COMPARISON:  None.    FINDINGS:  The ventricles are normal in size and configuration.  There is normal gray-white matter differentiation maintained.  There is no evidence for abnormal masses, mass effect, or midline shift.  No abnormal intra or extra-axial fluid collections are identified, specifically there is no evidence for intracranial hemorrhage.  Mastoid air cells and visualized paranasal sinuses are clear.  Bony calvarium is intact.

## 2019-03-13 NOTE — PLAN OF CARE
Problem: Fall Injury Risk  Goal: Absence of Fall and Fall-Related Injury    Intervention: Identify and Manage Contributors to Fall Injury Risk   03/13/19 0917   Manage Acute Allergic Reaction   Medication Review/Management medications reviewed;high risk medications identified   Identify and Manage Contributors to Fall Injury Risk   Self-Care Promotion independence encouraged;BADL personal objects within reach;BADL personal routines maintained     Intervention: Promote Injury-Free Environment   03/13/19 0917   Optimize [place] and Functional Mobility   Environmental Safety Modification assistive device/personal items within reach;clutter free environment maintained;lighting adjusted;room organization consistent   Optimize Balance and Safe Activity   Safety Promotion/Fall Prevention diversional activities provided;Fall Risk reviewed with patient/family;lighting adjusted;nonskid shoes/socks when out of bed;side rails raised x 2         Comments:.Patient used the urinal throughout the night and would assist with care as tolerated. He called for assistance when he needed to get out of bed and required only minimal assist.Patient remained free of pain and rested well.

## 2019-03-14 VITALS
SYSTOLIC BLOOD PRESSURE: 135 MMHG | TEMPERATURE: 98 F | WEIGHT: 210.56 LBS | HEIGHT: 71 IN | OXYGEN SATURATION: 95 % | RESPIRATION RATE: 18 BRPM | HEART RATE: 80 BPM | DIASTOLIC BLOOD PRESSURE: 79 MMHG | BODY MASS INDEX: 29.48 KG/M2

## 2019-03-14 LAB
ALBUMIN SERPL BCP-MCNC: 4.1 G/DL
ALP SERPL-CCNC: 98 U/L
ALT SERPL W/O P-5'-P-CCNC: 22 U/L
ANION GAP SERPL CALC-SCNC: 9 MMOL/L
AST SERPL-CCNC: 17 U/L
BASOPHILS # BLD AUTO: 0.05 K/UL
BASOPHILS NFR BLD: 0.5 %
BILIRUB SERPL-MCNC: 0.7 MG/DL
BUN SERPL-MCNC: 11 MG/DL
CALCIUM SERPL-MCNC: 9.9 MG/DL
CHLORIDE SERPL-SCNC: 104 MMOL/L
CO2 SERPL-SCNC: 23 MMOL/L
CREAT SERPL-MCNC: 1.1 MG/DL
DIFFERENTIAL METHOD: ABNORMAL
EOSINOPHIL # BLD AUTO: 0.1 K/UL
EOSINOPHIL NFR BLD: 1 %
ERYTHROCYTE [DISTWIDTH] IN BLOOD BY AUTOMATED COUNT: 12.9 %
EST. GFR  (AFRICAN AMERICAN): >60 ML/MIN/1.73 M^2
EST. GFR  (NON AFRICAN AMERICAN): >60 ML/MIN/1.73 M^2
GLUCOSE SERPL-MCNC: 201 MG/DL
HCT VFR BLD AUTO: 43.7 %
HGB BLD-MCNC: 14.4 G/DL
LYMPHOCYTES # BLD AUTO: 3.1 K/UL
LYMPHOCYTES NFR BLD: 29.4 %
MCH RBC QN AUTO: 28.3 PG
MCHC RBC AUTO-ENTMCNC: 33 G/DL
MCV RBC AUTO: 86 FL
MONOCYTES # BLD AUTO: 0.7 K/UL
MONOCYTES NFR BLD: 6.7 %
NEUTROPHILS # BLD AUTO: 6.6 K/UL
NEUTROPHILS NFR BLD: 62.8 %
PLATELET # BLD AUTO: ABNORMAL K/UL
PLATELET BLD QL SMEAR: ABNORMAL
PMV BLD AUTO: ABNORMAL FL
POCT GLUCOSE: 206 MG/DL (ref 70–110)
POCT GLUCOSE: 209 MG/DL (ref 70–110)
POCT GLUCOSE: 264 MG/DL (ref 70–110)
POCT GLUCOSE: 277 MG/DL (ref 70–110)
POTASSIUM SERPL-SCNC: 4.1 MMOL/L
PROT SERPL-MCNC: 8.2 G/DL
RBC # BLD AUTO: 5.09 M/UL
SODIUM SERPL-SCNC: 136 MMOL/L
WBC # BLD AUTO: 10.64 K/UL

## 2019-03-14 PROCEDURE — 99232 SBSQ HOSP IP/OBS MODERATE 35: CPT | Mod: ,,, | Performed by: PSYCHIATRY & NEUROLOGY

## 2019-03-14 PROCEDURE — 99232 PR SUBSEQUENT HOSPITAL CARE,LEVL II: ICD-10-PCS | Mod: ,,, | Performed by: PSYCHIATRY & NEUROLOGY

## 2019-03-14 PROCEDURE — 36415 COLL VENOUS BLD VENIPUNCTURE: CPT

## 2019-03-14 PROCEDURE — 25000003 PHARM REV CODE 250: Performed by: HOSPITALIST

## 2019-03-14 PROCEDURE — 85025 COMPLETE CBC W/AUTO DIFF WBC: CPT

## 2019-03-14 PROCEDURE — 80053 COMPREHEN METABOLIC PANEL: CPT

## 2019-03-14 RX ORDER — LISINOPRIL 20 MG/1
20 TABLET ORAL DAILY
Status: DISCONTINUED | OUTPATIENT
Start: 2019-03-14 | End: 2019-03-14 | Stop reason: HOSPADM

## 2019-03-14 RX ORDER — ASPIRIN 325 MG
325 TABLET ORAL DAILY
Refills: 0
Start: 2019-03-15 | End: 2023-08-15

## 2019-03-14 RX ORDER — HYDROCHLOROTHIAZIDE 25 MG/1
25 TABLET ORAL 2 TIMES DAILY
Status: DISCONTINUED | OUTPATIENT
Start: 2019-03-14 | End: 2019-03-14 | Stop reason: HOSPADM

## 2019-03-14 RX ORDER — HYDROCHLOROTHIAZIDE 25 MG/1
25 TABLET ORAL 2 TIMES DAILY
Qty: 60 TABLET | Refills: 0 | Status: SHIPPED | OUTPATIENT
Start: 2019-03-14 | End: 2019-05-21 | Stop reason: SDUPTHER

## 2019-03-14 RX ORDER — LISINOPRIL 40 MG/1
40 TABLET ORAL DAILY
Qty: 30 TABLET | Refills: 0 | Status: SHIPPED | OUTPATIENT
Start: 2019-03-14 | End: 2019-05-21 | Stop reason: SDUPTHER

## 2019-03-14 RX ORDER — CLONIDINE HYDROCHLORIDE 0.1 MG/1
0.1 TABLET ORAL EVERY 4 HOURS PRN
Status: DISCONTINUED | OUTPATIENT
Start: 2019-03-14 | End: 2019-03-14 | Stop reason: HOSPADM

## 2019-03-14 RX ADMIN — LISINOPRIL 20 MG: 20 TABLET ORAL at 08:03

## 2019-03-14 RX ADMIN — INSULIN ASPART 6 UNITS: 100 INJECTION, SOLUTION INTRAVENOUS; SUBCUTANEOUS at 08:03

## 2019-03-14 RX ADMIN — ATORVASTATIN CALCIUM 40 MG: 40 TABLET, FILM COATED ORAL at 08:03

## 2019-03-14 RX ADMIN — HYDROCHLOROTHIAZIDE 25 MG: 25 TABLET ORAL at 08:03

## 2019-03-14 RX ADMIN — ASPIRIN 325 MG ORAL TABLET 325 MG: 325 PILL ORAL at 08:03

## 2019-03-14 NOTE — DISCHARGE SUMMARY
[facility] Hospital Medicine  Discharge Summary      Patient Name: Lore Shrestha  MRN: [id number]  Admission Date: 3/11/2019  Hospital Length of Stay: 3 days  Discharge Date and Time:  03/14/2019 9:08 AM  Attending Physician: Patricia Boyle MD   Discharging Provider: Patricia Boyle MD  Primary Care Provider: Helen Spencer NP      HPI:   Lore Shrestha 53 y.o. male with HTN, HLD, HIV, and DM2 presents to the hospital with a chief complaint of left sided weakness and numbness which began suddenly at 10:30am. He reports the numbness on his left side has been constant since that time and is still present now. He denies any recent head trauma or falls. He also reports he feels as though his left arm is weaker. He denies any slurred speech, difficulty swallowing, or facial droop. His sister reports she felt as though he was slurring his speech this morning. He denies any weakness or altered sensation to his lower extremities. His last CD4 count was greater than 500 and viral load undetectable he follows with [facility] for treatment. He has juana receiving treatment with orthopedics for left shoulder tendinitis. He reports his left shoulder feels as though it is stiff. He reports on 3/6 he received an injection in his left shoulder. He endorses left sided weakness and numbness. He denies fever, chest pain, SOB, N/V/D, abdominal pain, dysuria, syncope, and head trauma.     In the ED, vascular neuro consulted and recommended admission for MRI, CT head without acute abnormality, chest x-ray without acute process, and EKG of NSR.     * No surgery found *      Hospital Course:   Lore Shrestha 53 y.o. male placed in observation for left sided weakness. In the ED, vascular neuro consulted and recommended admission for MRI, CT head without acute abnormality, chest x-ray without acute process, and EKG of NSR. MRI show,Small focus of acute infarction in the right thalamus.  No evidence of  hemorrhagic conversion.PT,OT,ST was consulted,neurology as well.  He was on ASA,statin. carotid doppler,echo,MRA brain,unemarkable.  Was on permissive HTN.started gradually on BP med;s,still has some left face and arm numbness,he was monitored with improvement.  He did well with PT,OT and ST and out patient PT,OT recommended,  He has been discharged home with increased BP med's,ASA, and follow up with PCP in next few days.     Consults:   Consults (From admission, onward)        Status Ordering Provider     Inpatient consult to Neurology  Once     Provider:  Mayur Guzman MD    Completed TA PACHECO     Inpatient consult to Registered Dietitian/Nutritionist  Once     Provider:  (Not yet assigned)    Completed MILDRED FORBES     Inpatient consult to Telemedicine-Stroke  Once     Provider:  Lisa Mendez MD    Completed MUSTAPHA SILVERIO          No new Assessment & Plan notes have been filed under this hospital service since the last note was generated.  Service: Hospital Medicine    Final Active Diagnoses:    Diagnosis Date Noted POA    PRINCIPAL PROBLEM:  CVA (cerebral vascular accident) [I63.9] 03/11/2019 Yes    Acute left-sided weakness [M62.89] 03/11/2019 Yes    Left sided numbness [R20.0] 03/11/2019 Yes    Hyperlipidemia [E78.5] 03/11/2019 Yes    Essential hypertension [I10] 04/09/2013 Yes    HIV (human immunodeficiency virus infection) [B20] 04/09/2013 Yes    Diabetes mellitus type II, uncontrolled [E11.65] 04/09/2013 Yes      Problems Resolved During this Admission:       Discharged Condition: stable    Disposition: Home or Self Care    Follow Up:    Patient Instructions:      Ambulatory Referral to Physical/Occupational Therapy   Referral Priority: Routine Referral Type: Physical Medicine   Referral Reason: Specialty Services Required   Number of Visits Requested: 1     Activity as tolerated       Significant Diagnostic Studies: Labs:   BMP:   Recent Labs   Lab 03/13/19  0602 03/14/19  0630    * 201*    136   K 4.3 4.1    104   CO2 27 23   BUN 14 11   CREATININE 1.1 1.1   CALCIUM 9.1 9.9    and CBC   Recent Labs   Lab 03/13/19  0602 03/14/19  0630   WBC 10.01 10.64   HGB 13.3* 14.4   HCT 40.3 43.7    SEE COMMENT     Radiology: X-Ray: CXR: X-Ray Chest 1 View (CXR): No results found for this visit on 03/11/19. and X-Ray Chest PA and Lateral (CXR): No results found for this visit on 03/11/19.  MRI: brain   CT scan: head   Ultrasound: carotid doppler   Cardiac Graphics: Echocardiogram:   2D echo with color flow doppler:   Results for orders placed or performed during the hospital encounter of 04/08/13   2D echo with color flow doppler   Result Value Ref Range    QEF 55     Mitral Valve Regurgitation trivial     and Transthoracic echo (TTE) complete (Cupid Only):   Results for orders placed or performed during the hospital encounter of 03/11/19   Transthoracic echo (TTE) complete (Cupid Only)   Result Value Ref Range    BSA 2.24 m2    TDI SEPTAL 0.05     LV LATERAL E/E' RATIO 4.82     LV SEPTAL E/E' RATIO 10.60     LA WIDTH 3.37 cm    AORTIC VALVE CUSP SEPERATION 2.15 cm    TDI LATERAL 0.11     PV PEAK VELOCITY 0.96 cm/s    LVIDD 4.26 3.5 - 6.0 cm    IVS 1.23 (A) 0.6 - 1.1 cm    PW 1.25 (A) 0.6 - 1.1 cm    Ao root annulus 2.88 cm    LVIDS 3.13 2.1 - 4.0 cm    FS 27 28 - 44 %    LA volume 54.48 cm3    Sinus 2.94 cm    STJ 2.49 cm    Ascending aorta 2.88 cm    LV mass 191.02 g    LA size 4.10 cm    RVDD 4.62 cm    TAPSE 2.39 cm    RV S' 11.33 m/s    Left Ventricle Relative Wall Thickness 0.59 cm    AV mean gradient 3.34 mmHg    AV valve area 2.85 cm2    AV Velocity Ratio 0.72     AV index (prosthetic) 0.93     E/A ratio 0.85     Mean e' 0.08     E wave decelartion time 251.79 msec    IVRT 0.12 msec    LVOT diameter 1.98 cm    LVOT area 3.08 cm2    LVOT peak jose [id number] m/s    LVOT peak VTI 19.08 cm    Ao peak jose 1.28 m/s    Ao VTI 20.61 cm    LVOT stroke volume 58.72 cm3    AV  peak gradient 6.55 mmHg    E/E' ratio 6.63     MV Peak E Arnoldo 0.53 m/s    TR Max Arnoldo 2.25 m/s    MV Peak A Arnoldo 0.62 m/s    LV Systolic Volume 38.71 mL    LV Systolic Volume Index 17.6 mL/m2    LV Diastolic Volume 81.42 mL    LV Diastolic Volume Index 37.08 mL/m2    LA Volume Index 24.8 mL/m2    LV Mass Index 87.0 g/m2    RA Major Axis 4.93 cm    Left Atrium Minor Axis 4.47 cm    Left Atrium Major Axis 4.82 cm    Triscuspid Valve Regurgitation Peak Gradient 20.25 mmHg    RA Width 4.40 cm    Right Atrial Pressure (from IVC) 3 mmHg    TV rest pulmonary artery pressure 23 mmHg       Pending Diagnostic Studies:     None         Medications:  Reconciled Home Medications:      Medication List      START taking these medications    aspirin 325 MG tablet  Take 1 tablet (325 mg total) by mouth once daily.  Start taking on:  3/15/2019  Replaces:  aspirin 81 MG Chew        CHANGE how you take these medications    hydroCHLOROthiazide 25 MG tablet  Commonly known as:  HYDRODIURIL  Take 1 tablet (25 mg total) by mouth 2 (two) times daily.  What changed:  when to take this     insulin lispro protamine-insulin lispro 100 unit/mL (75-25) Susp  Commonly known as:  HUMALOG 75/25  Inject 10 Units into the skin 2 (two) times daily before meals.  What changed:  how much to take     lisinopril 40 MG tablet  Commonly known as:  PRINIVIL,ZESTRIL  Take 1 tablet (40 mg total) by mouth once daily.  What changed:    · medication strength  · how much to take        CONTINUE taking these medications    amitriptyline 25 MG tablet  Commonly known as:  ELAVIL  Take 25 mg by mouth nightly as needed.     atorvastatin 40 MG tablet  Commonly known as:  LIPITOR  Take 40 mg by mouth once daily.     darunavir ethanolate 400 MG Tab  Commonly known as:  PREZISTA  Take by mouth.     etravirine 100 mg Tab  Commonly known as:  INTELENCE  Take 200 mg by mouth 2 (two) times daily with meals.     ritonavir 100 mg Cap  Commonly known as:  NORVIR  Take 600 mg by mouth  2 (two) times daily.     tenofovir 300 mg Tab  Commonly known as:  VIREAD  Take 300 mg by mouth once daily.        STOP taking these medications    aspirin 81 MG Chew  Replaced by:  aspirin 325 MG tablet     ibuprofen 600 MG tablet  Commonly known as:  ADVIL,MOTRIN     metFORMIN 500 MG tablet  Commonly known as:  GLUCOPHAGE            Indwelling Lines/Drains at time of discharge:   Lines/Drains/Airways          None          Time spent on the discharge of patient: over 30  minutes  Patient was seen and examined on the date of discharge and determined to be suitable for discharge.         Patricia Boyle MD  Department of Hospital Medicine  [facility]

## 2019-03-14 NOTE — NURSING
Discharge instructions given to patient, verbalized understanding. Patient left with transport by wheelchair to the family vehicle. No distress noted.

## 2019-03-14 NOTE — PROGRESS NOTES
Follow-up Information     Helen Spencer NP.    Specialty:  Family Medicine  Why:  Outpatient Services PCP Follow-Up Clinic nurse will contact the patient with an appointment in 1 week.  Contact information:  [id number]  [facility] [address] [phone]             [facility] On 3/19/2019.    Why:  Outpatient Services OT, Tuesday at 10:00 AM with Shanna; PT on Monday, 03/25/19 at 8:00 AM with Janett  Contact information:  [address] [address] [phone]             PLEASE BRING TO ALL FOLLOW UP APPOINTMENTS:   1) A COPY YOUR DISCHARGE INSTRUCTIONS   2) ALL MEDICINES YOU ARE CURRENTLY TAKING IN THEIR ORIGINAL BOTTLES   3) IDENTIFICATION CARD   4) INSURANCE CARD    **PLEASE ARRIVE 15 MINUTES AHEAD OF SCHEDULED APPOINTMENT TIME   ++PLEASE CALL 24 HOURS IN ADVANCE IF YOU MUST RESCHEDULE YOUR APPOINTMENT DAY AND/OR TIME     [facility]    WRITTEN HEALTHCARE AND DISCHARGE INFORMATION                        Help at Home           [phone]  After discharge for assistance [facility] On Call Nurse Care Line 24/7  Assistance    Things You are responsible For To Manage Your Care At Home:  1.    Getting your prescriptions filled   2.    Taking your medications as directed, DO NOT MISS ANY DOSES!  3.    Going to your follow-up doctor appointment. This is important because it  allow the doctor to monitor your progress and determine if  any changes need to made to your treatment plan.     Thank you for choosing [facility] for your care.  Please answer any calls you may receive from [facility] we want to continue to support you as you manage your healthcare needs. [facility] is happy to have the opportunity to serve you.     Sincerely,  Your [facility] Team,  Ml Angeles RN [place]  Registered Nurse Transitional Navigator  [phone]

## 2019-03-14 NOTE — PLAN OF CARE
Problem: Fall Injury Risk  Goal: Absence of Fall and Fall-Related Injury    Intervention: Promote Injury-Free Environment   03/14/19 1125   Optimize [place] and Functional Mobility   Environmental Safety Modification assistive device/personal items within reach;clutter free environment maintained;room organization consistent   Optimize Balance and Safe Activity   Safety Promotion/Fall Prevention assistive device/personal item within reach;bed alarm set;commode/urinal/bedpan at bedside;Fall Risk reviewed with patient/family;Fall Risk signage in place;medications reviewed;nonskid shoes/socks when out of bed;side rails raised x 2;instructed to call staff for mobility         Problem: Adult Inpatient Plan of Care  Goal: Plan of Care Review  Outcome: Ongoing (interventions implemented as appropriate)   03/14/19 1125   Plan of Care Review   Plan of Care Reviewed With patient

## 2019-03-14 NOTE — PHYSICIAN QUERY
PT Name: Lore Shrestha  MR #: [id number]    Physician Query Form - HIV Clarification     CDS: Natali Marrero RN  Contact information: [email or web address]    This form is a permanent document in the medical record.     Query Date: March 14, 2019      By submitting this query, we are merely seeking further clarification of documentation. Please utilize your independent clinical judgment when addressing the question(s) below.    The Medical record contains the following:   Indicators   Supporting Clinical Findings Location in Medical Record   x HIV or AIDS HIV (human immunodeficiency virus infection) H&P 3/11   x CD4 Count          CD4%        Viral Load His last CD4 count was greater than 500 and viral load undetectable he follows with [facility] for treatment. H&P 3/11    History of Opportunistic Infection      Cancer  Pneumocystis Pneumonia (PCP)  Cytomegalovirus (CMV)  Kaposi Sarcoma      HIV-Related Conditions (e.g. Dementia, Encephalopathy) documented     x Medications Will continue home intelence, prerzista, and norvir when tolerating PO.  H&P 3/11    Other       Please clarify the patients HIV status    Per CDC publication Vol 60 RR-17 https://www.cdc.gov/mmwr/preview/mmwrhtml/py0697e8.htmRelating to the classification HIV Infection, once a patient is diagnosed with AIDS the diagnosis still stands even if, after treatment, the CD4+ T cell count rises to above 200 per ìL of blood or other AIDS-defining illnesses are cured.       The following are AIDS-Defining Illnesses or HIV-Related Diseases.  Bacterial infections, multiple or recurrent only in children aged <6 years Kaposi sarcoma   Candidiasis of bronchi, trachea, or lungs Lymphoma, Burkitt (or equivalent term)   Candidiasis of esophagus Lymphoma, immunoblastic (or equivalent term)   Cervical cancer, invasive Only among adults, adolescents, and children aged > 6 years. Lymphoma, primary, of brain   Coccidioidomycosis, disseminated or extrapulmonary  Mycobacterium avium complex or Mycobacterium kansasii, disseminated or extrapulmonary   Cryptococcosis, extrapulmonary Mycobacterium tuberculosis of any site, pulmonary, disseminated, or extrapulmonary   Cryptosporidiosis, chronic intestinal (>1 months duration) Mycobacterium, other species or unidentified species, disseminated or extrapulmonary   Cytomegalovirus disease (other than liver, spleen, or nodes), onset at age >1 month Pneumocystis jirovecii (previously known as Pneumocystis carinii) pneumonia   Cytomegalovirus retinitis (with loss of vision) Pneumonia, recurrent Only among adults, adolescents, and children aged .6 years.   Encephalopathy attributed to HIV Progressive multifocal leukoencephalopathy   Herpes simplex: chronic ulcers (>1 months duration) or bronchitis, pneumonitis, or esophagitis (onset at age >1 month) Salmonella septicemia, recurrent   Histoplasmosis, disseminated or extrapulmonary Toxoplasmosis of brain, onset at age >1 month   Isosporiasis, chronic intestinal (>1 months duration) Wasting syndrome attributed to HIV       [  x ] Asymptomatic HIV Infection - Positive Status Only - without any history of (or current) AIDS-Defining Illness or HIV-Related Illness     [   ] HIV Disease / AIDS - Meets the current CDC Definition of AIDS: HIV-infected persons who have less than 200 CD4+ T-lymphocytes/uL, or CD4+ T-lymphocyte percentage of total lymphocytes of less than 14, and/or an AIDS-Defining Illness or HIV-Related Disease (see above).     [  ] Clinically Undetermined         Please document in your progress notes daily for the duration of treatment until resolved and include in your discharge summary.

## 2019-03-14 NOTE — PROGRESS NOTES
"1010 TN spoke with pt regarding Outpt therapy; pt prefers [facility] the [facility]; will schedule.    1030 TN contacted [facility] PT/OT Outpt at [phone]; spoke with Nupur. PT scheduled on 03/25/19 at 8:00 AM with Shanna. OT scheduled on 03/25/19 at 10:00 AM with Shanna.     1110 EDUCATION:  TN provided with educational information on CVA.  Information reviewed and placed in :My Healthcare Packet" to be brought home for Pt to use as resource after discharge.  Information included:  signs and symptoms to look for and call the doctor if experiencing, and symptoms that may indicate a medical emergency: CALL 911.      All questions answered.  Teach back method used.    Patient stated, "I will keep my appts".    TN informed floor nurse, Pauline, care management is complete and can proceed with discharge teaching.        "

## 2019-03-14 NOTE — PLAN OF CARE
Problem: Fall Injury Risk  Goal: Absence of Fall and Fall-Related Injury    Intervention: Identify and Manage Contributors to Fall Injury Risk   03/14/19 0849   Manage Acute Allergic Reaction   Medication Review/Management medications reviewed;high risk medications identified   Identify and Manage Contributors to Fall Injury Risk   Self-Care Promotion independence encouraged;BADL personal objects within reach         Comments: Patient appeared to be resting well throughout the night but at 0630 found patient sitting in bedside chair in no distress but appeared upset . He refused to speak with me except to state everything was fine. But he did appear upset. Another patient was moved into his room at 0430-staff believed he was upset that he had a roommate. The patient never verbalized what was wrong.

## 2019-03-14 NOTE — PROGRESS NOTES
[facility]  Neurology  Progress Note    Patient Name: Lore Shrestha  MRN: [id number]  Admission Date: 3/11/2019  Hospital Length of Stay: 3 days  Code Status: Full Code   Attending Provider: Patricia Boyle MD  Primary Care Physician: Helen Spencer NP   Principal Problem:CVA (cerebral vascular accident)    Subjective:     Interval History: 54 y/o male with medical Hx as listed below comes to ED after sudden onset of numbness on the left side of his body. Pt jacque headaches, visual or speech disturbances, vertigo, weakness. No aggravating or alleviating factors. No previous episodes. Mr. Shrestha tells me that his BP has been high.    -3/14/19: Pt with numbness on left side but no weakness, visual or speech disturbances.    Current Neurological Medications:     Current Facility-Administered Medications   Medication Dose Route Frequency Provider Last Rate Last Dose    acetaminophen tablet 650 mg  650 mg Oral Q6H PRN Patricia Boyle MD   650 mg at 03/12/19 1221    aspirin tablet 325 mg  325 mg Oral Daily Patricia Boyle MD   325 mg at 03/14/19 0834    atorvastatin tablet 40 mg  40 mg Oral Daily Patricia Boyle MD   40 mg at 03/14/19 0836    cloNIDine tablet 0.1 mg  0.1 mg Oral Q4H PRN Patricia Boyle MD        dextrose 50% injection 12.5 g  12.5 g Intravenous PRN Kurt Eugene PA-C        diphenhydrAMINE capsule 25 mg  25 mg Oral Q6H PRN Ant Zhang MD   25 mg at 03/13/19 2328    glucagon (human recombinant) injection 1 mg  1 mg Intramuscular PRN Kurt Eugene PA-C        hydroCHLOROthiazide tablet 25 mg  25 mg Oral BID Patricia Boyle MD   25 mg at 03/14/19 0836    insulin aspart U-100 pen 1-10 Units  1-10 Units Subcutaneous Q6H PRN Kurt Eugene PA-C   6 Units at 03/14/19 0856    lisinopril tablet 20 mg  20 mg Oral Daily Patricia Boyle MD   20 mg at 03/14/19 0836    sodium chloride 0.9% flush 5 mL  5 mL Intravenous PRN Panfilo L.  MD Andreas           Review of Systems   Constitutional: Negative for fever.   HENT: Negative for trouble swallowing.    Eyes: Negative for photophobia.   Respiratory: Negative for shortness of breath.    Cardiovascular: Negative for chest pain.   Gastrointestinal: Negative for abdominal pain.   Genitourinary: Negative for dysuria.   Musculoskeletal: Negative for back pain.   Neurological: Negative for headaches.   Psychiatric/Behavioral: Negative for confusion.         Objective:     Vital Signs (Most Recent):  Temp: 97.9 °F (36.6 °C) (03/14/19 1114)  Pulse: 80 (03/14/19 1114)  Resp: 18 (03/14/19 1114)  BP: 135/79 (03/14/19 1114)  SpO2: 95 % (03/14/19 1114) Vital Signs (24h Range):  Temp:  [97.5 °F (36.4 °C)-98 °F (36.7 °C)] 97.9 °F (36.6 °C)  Pulse:  [80-89] 80  Resp:  [18] 18  SpO2:  [93 %-98 %] 95 %  BP: (123-153)/(72-88) 135/79     Weight: 95.5 kg (210 lb 8.6 oz)  Body mass index is 29.36 kg/m².    Physical Exam  Constitutional: He is oriented to person, place, and time.   HENT:   Head: Normocephalic.   Eyes: Right eye exhibits no discharge. Left eye exhibits no discharge.   Neck: Normal range of motion.   Cardiovascular: Regular rhythm.   Pulmonary/Chest: Breath sounds normal.   Abdominal: Bowel sounds are normal.   Musculoskeletal: He exhibits no edema.   Neurological: He is oriented to person, place, and time. He has normal strength. He has a normal Finger-Nose-Finger Test and a normal Heel to Young Test.   Skin: He is not diaphoretic.   Psychiatric: His speech is normal.         NEUROLOGICAL EXAMINATION:      MENTAL STATUS   Oriented to person, place, and time.   Speech: speech is normal   Level of consciousness: alert     CRANIAL NERVES      CN III, IV, VI   Right pupil: Size: 2 mm. Shape: regular. Consensual response: intact.   Left pupil: Size: 2 mm. Shape: regular. Consensual response: intact.   Nystagmus: none   Ophthalmoparesis: none  Conjugate gaze: present     CN VII   Right facial weakness:  none  Left facial weakness: none     CN IX, X   Palate: symmetric     CN XI   Right trapezius strength: normal  Left trapezius strength: normal     CN XII   Tongue deviation: none     MOTOR EXAM      Strength   Strength 5/5 throughout.        Finger tapping decreased on left hand      SENSORY EXAM        Light touch decreased on left side      GAIT AND COORDINATION      Coordination   Finger to nose coordination: normal  Heel to shin coordination: normal     NIHSS: 1           Significant Labs:   CBC:   Recent Labs   Lab 03/13/19  0602 03/14/19  0630   WBC 10.01 10.64   HGB 13.3* 14.4   HCT 40.3 43.7    SEE COMMENT     CMP:   Recent Labs   Lab 03/13/19  0602 03/14/19  0630   * 201*    136   K 4.3 4.1    104   CO2 27 23   BUN 14 11   CREATININE 1.1 1.1   CALCIUM 9.1 9.9   PROT 6.9 8.2   ALBUMIN 3.6 4.1   BILITOT 0.5 0.7   ALKPHOS 87 98   AST 15 17   ALT 21 22   ANIONGAP 4* 9   EGFRNONAA >60 >60             Assessment and Plan:     54 y/o male with acute stroke     1. Stroke: right thalamic stroke with resulting contralateral sensory deficits.           This could be due to uncontrolled hypertension. No significant stenosis of vessels.            -Permissive HTN for now. Treat if > 220/110.           -ASA and statin.           -PT/OT.               Active Diagnoses:    Diagnosis Date Noted POA    PRINCIPAL PROBLEM:  CVA (cerebral vascular accident) [I63.9] 03/11/2019 Yes    Acute left-sided weakness [M62.89] 03/11/2019 Yes    Left sided numbness [R20.0] 03/11/2019 Yes    Hyperlipidemia [E78.5] 03/11/2019 Yes    Essential hypertension [I10] 04/09/2013 Yes    HIV (human immunodeficiency virus infection) [B20] 04/09/2013 Yes    Diabetes mellitus type II, uncontrolled [E11.65] 04/09/2013 Yes      Problems Resolved During this Admission:       VTE Risk Mitigation (From admission, onward)        Ordered     IP VTE HIGH RISK PATIENT  Once      03/11/19 1519     Place sequential compression  device  Until discontinued      03/11/19 1519     Place JOSE E hose  Until discontinued      03/11/19 1519          Mayur Guzman MD  Neurology  [facility]

## 2019-03-14 NOTE — PT/OT/SLP DISCHARGE
Occupational Therapy Discharge Summary    Lore Shrestha  MRN: [id number]   Principal Problem: CVA (cerebral vascular accident)      Patient Discharged from acute Occupational Therapy on 3/14/19.  Please refer to prior OT notes for functional status.    Assessment:      Patient appropriate for care in another setting.    Objective:     GOALS:   Multidisciplinary Problems     Occupational Therapy Goals        Problem: Occupational Therapy Goal    Goal Priority Disciplines Outcome Interventions   Occupational Therapy Goal     OT, PT/OT Ongoing (interventions implemented as appropriate)    Description:  Goals to be met by: 3/26/19    Patient will increase functional independence with ADLs by performing:    UE Dressing with Modified [place].  LE Dressing with Modified [place].  Grooming while standing at sink with Modified [place].  Supine to sit with Modified [place].  Toilet transfer to toilet with Modified Indepen dence.  Upper extremity exercise program x15 reps per handout, with independence.                      Reasons for Discontinuation of Therapy Services  Transfer to alternate level of care.      Plan:     Patient Discharged to: Outpatient Therapy Services    Jannette Charles OT  3/14/2019

## 2019-03-14 NOTE — PLAN OF CARE
03/14/19 1115   Final Note   Assessment Type Final Discharge Note   Anticipated Discharge Disposition Home   What phone number can be called within the next 1-3 days to see how you are doing after discharge? (listed in chart)   Hospital Follow Up  Appt(s) scheduled? Yes   Discharge plans and expectations educations in teach back method with documentation complete? Yes   Right Care Referral Info   Post Acute Recommendation Other   Referral Type (Outpatient Therapy/ OT/PT)   Facility Name ([facility] ([address])   [facility] ([place], [place])

## 2019-03-18 ENCOUNTER — PATIENT OUTREACH (OUTPATIENT)
Dept: ADMINISTRATIVE | Facility: CLINIC | Age: 54
End: 2019-03-18

## 2019-03-18 NOTE — PATIENT INSTRUCTIONS
Stroke (Completed)  You have had a mild stroke, also called CVA, or cerebrovascular accident. This is caused by a loss of blood flow to part of your brain. This can occur when a blood clot forms inside the carotid artery (main artery from the heart to the brain) or inside the heart (as with atrial fibrillation, an irregular heart rhythm). When the clot travels to the brain, it can lodge in a blood vessel and block blood flow. The other common cause of stroke is a gradual narrowing of the arteries in the brain due to atherosclerosis (hardening of the arteries).  Once you have had a stroke, you are at risk of having another. Therefore, be sure to follow up with your doctor for further evaluation and treatment. This may include an ultrasound of the arteries in your neck and an evaluation of your heart. If problems are found, your doctor will recommend treatment with medications and/or procedures.  Medications to reduce your chance of having another stroke include those that prevent blood clots, such as antiplatelet medicines (aspirin and Plavix) and anticoagulant medicines (warfarin).  Home Care:  Rest at home and avoid exertion for the next few days.  If your doctor has prescribed antiplatelet medication, take it as directed.  Follow Up:  Call your doctor for an appointment in the next few days for a repeat exam. Additional tests may be needed. If you had an x-ray, CT scan, MRI scan, or ECG (electrocardiogram), it will be reviewed by a specialist. You will be notified of any new findings that will affect your care.  Call 911 Or Emergency Services  if any of the following occur:  Any of your stroke symptoms worsen  New problems with speech, vision, walking, or weakness or numbness on one side of your body  Severe headache, fainting spell, dizziness or seizure  Chest pain or shortness of breath  © [id number] [facility], [address] [place] [address], [place] [address]. All rights reserved. This information is not intended  as a substitute for professional medical care. Always follow your healthcare professional's instructions.

## 2019-03-18 NOTE — PROGRESS NOTES
C3 nurse attempted to contact patient. The following occurred:   C3 nurse attempted to contact Lore Shrestha  for a TCC post hospital discharge follow up call. The patient is unable to conduct the call @ this time. The patient requested a callback.    The patient does not have a scheduled HOSFU appointment within 7-14 days post hospital discharge date 3/14. Message sent to Physician staff to assist with HOSFU appointment scheduling.[facility]

## 2019-03-19 ENCOUNTER — CLINICAL SUPPORT (OUTPATIENT)
Dept: REHABILITATION | Facility: HOSPITAL | Age: 54
End: 2019-03-19
Attending: HOSPITALIST
Payer: COMMERCIAL

## 2019-03-19 DIAGNOSIS — M25.60 RANGE OF MOTION DEFICIT: ICD-10-CM

## 2019-03-19 DIAGNOSIS — Z78.9 ALTERATION IN PERFORMANCE OF ACTIVITIES OF DAILY LIVING: ICD-10-CM

## 2019-03-19 DIAGNOSIS — I63.9 LACK OF COORDINATION DUE TO ACUTE CEREBROVASCULAR ACCIDENT (CVA): ICD-10-CM

## 2019-03-19 DIAGNOSIS — R29.898 LEFT ARM WEAKNESS: ICD-10-CM

## 2019-03-19 DIAGNOSIS — R27.9 LACK OF COORDINATION DUE TO ACUTE CEREBROVASCULAR ACCIDENT (CVA): ICD-10-CM

## 2019-03-19 PROCEDURE — 97165 OT EVAL LOW COMPLEX 30 MIN: CPT | Mod: PN

## 2019-03-19 NOTE — PLAN OF CARE
[facility] Occupational Therapy  Initial Neurological Evaluation     Date: 3/19/2019  Patient: Lore Shrestha  Chart Number: [id number]    Therapy Diagnosis:   Encounter Diagnoses   Name Primary?    Range of motion deficit     Left arm weakness     Alteration in performance of activities of daily living     Lack of coordination due to acute cerebrovascular accident (CVA)      Physician: Patricia Boyle,*    Physician Orders: eval and treat  Medical Diagnosis: acute infarction in the right thalamus  Evaluation Date: 3/19/2019  Plan of Care Expiration Period: 3/19/19 - 5/14/19  Insurance Authorization period Expiration: 12/31/19  Date of Return to MD: to new primary physician next week(outside doctor)  Visit # / Visits Authorized: 1 / 20  FOTO: 5th visit    Time In:10:15 am  Time Out: 11:15 am  Total Billable (one on one) Time: 60 minutes    Precautions: Standard and Diabetes    Subjective     History of Current Condition: had a cold sensation on left side of face then left side went numb, still can't feel neck and ear    Involved Side: left side  Dominant Side: Right  Date of Onset: 3/11/19  Surgical Procedure: n/a  Imaging: MRI studies   FINDINGS:  The craniocervical junction is unremarkable.  The intracranial flow voids are within normal limits.    There is a small focus of diffusion restriction in the right thalamus.  There is associated T2/FLAIR signal hyperintensity corresponding to the region of diffusion restriction.  There are two foci of subcentimeter T2/FLAIR signal hyperintensity in the anterior periventricular white matter.  The ventricles and sulci are otherwise unremarkable.  There are no extra-axial fluid collections.  There is no evidence of intracranial hemorrhage.  There is no evidence of mass effect.    The orbits and intraorbital contents are unremarkable.  The paranasal sinuses and mastoid air cells are clear.  The calvarium is intact.      Impression       Small focus of  acute infarction in the right thalamus.  No evidence of hemorrhagic conversion.     FINDINGS:  In the anterior circulation there is normal appearance of the petrous internal carotid artery.  Normal appearance of the cavernous carotid arteries bilaterally.    In the anterior circulation no significant stenotic disease of the middle or the anterior cerebral arteries.  There is no signs for dissections or occlusions or filling defects to suggest a thrombus.  No significant stenotic disease.  There is no anterior circulation aneurysms.    In the posterior circulation the V4 segments of the vertebral arteries are symmetric and within normal limits.  There is normal basilar artery without significant narrowing or filling defects or occlusive disease.  The posterior cerebral arteries are symmetric and within normal limits.    It should be noted that the lacunar type infarction in the right thalamus is in the distribution of the thalamoperforators and are usually not visualized on the MRI study due to the small caliber of these vessels.      Impression       MRA of the [id number] within normal limits.       Previous Therapy: ST eval only, PT, OT  X 4 days    Patient's Goals for Therapy: To try to get the left to balance out with the right.    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    Functional Limitations/Social History:    Prior Level of Function: pt states that his left arm was bothering him with stiffness and soreness- moderate use of left arm prior to stroke  Current Level of Function:decreased functional use of left arm, increased time for dressing, modified by sitting    Home/Living environment :  lives alone  Home Access: 3rd floor apartment  DME: none     Leisure: play and listen to music    Driving: active    Past Medical History/Physical Systems Review:     Past Medical History:  Lore Shrestha  has a past medical history of Diabetes mellitus, HIV (human immunodeficiency virus infection), and Hypertension.    Past Surgical History:  Lore Shrestha  has a past surgical history that includes No past surgeries (03/11/2019).    Current Medications:  Lore has a current medication list which includes the following prescription(s): amitriptyline, aspirin, atorvastatin, darunavir ethanolate, etravirine, hydrochlorothiazide, insulin lispro protamine-insulin lispro, lisinopril, ritonavir, and tenofovir.    Allergies:  Review of patient's allergies indicates:  No Known Allergies       Objective     Cognitive Exam:  Oriented: Person, Place, Time and Situation  Behaviors: normal, cooperative, frustrated with his primary care  Follows Commands/attention: attentive  Communication: clear/fluent  Memory: No Deficits noted as determined by 3 word recall after 1 minute and 3 minutes  Safety awareness/insight to disability: aware of diagnosis, treatment, and prognosis  Coping skills/emotional control: Appropriate to situation    Visual/Perceptual:  Tracking: intact  Saccades: intact  Acuity: ready glasses  R/L discrimination: intact  Visual field: intact  Motor Planning Praxis: intact  Comments: none    Physical Exam:  Postural examination/scapula alignment: Head forward and Affected scapula elevated  Joint integrity: intact   Skin integrity: intact  Edema: none noted       Joint Evaluation  AROM  3/19/2019 AROM    AROM   PROM       Left Left     Shoulder flex 0-180 90       Shoulder Abd 0-180 60      Shoulder ER 0-90 45      Shoulder IR 0-90 45 to hip      Shoulder Extension 0-80 WFL      Shoulder Horizontal adduction 0-90 WFL      Elbow flex/ext 0-150 WFL      Wrist flex 0-80 WFL      Wrist ext 0-70 WFL      Supination 0-80  WFL      Pronation 0-80 WFL        Fist: normal, able to oppose to all tips and 5th MCP heads bilaterally    Right UE AROM all joints WFL      Strength 3/19/2019 3/19/2019   **within available ROM** Left Right   Shoulder flex 4+/5 5/5   Shoulder abd 4+/5 5/5   Shoulder ER 4/5 5/5   Shoulder IR 4/5 5/5   Shoulder Extension 4+/5 5/5   Shoulder Horizontal adduction 4+/5 5/5   Elbow flex 4+/5 5/5   Elbow ext 4+/5 5/5   Wrist flex 4+/5 5/5   Wrist ext 4+/5 5/5   Supination 4/5 5/5   Pronation 4/5 5/5      Strength: (KAYKAY Dynamometer in lbs.) Average 1 trials, Position II:     3/19/2019 3/19/2019    Left Right   Rung II 47.6# 109.4#     Pinch Strength (Measured in psi)     3/19/2019 3/19/2019    Left Right   Key Pinch 17 psi 26 psi   3pt Pinch 10 psi 25 psi     Fine Motor Coordination: 9 Hole Peg Test  Left 3/19/2019 Right 3/19/2019   34 seconds 2 drops    22 seconds no drops     Gross motor coordination:   - JIMMY (Rapid Alternating Movements): slight impaired speed  - Finger to Nose (5 times): impaired targeting with BUE  - Finger Flicks (coordination moving from digit flexion to digit extension): intact    Tone:  Modified Farideh Scale:   0 - No increase in muscle tone    Comments: ataxia noted with fine motor tasks    Sensation:  Lore  reports continued numbness in left side  Light touch: bilateral intact  Sharp/Dull: n/t  Kinesthesia: bilateralintact  Proprioception: bilateral intact  Temperature: nt     Balance:   Static Sit - GOOD+: Takes MAXIMAL challenges from all directions.    Dynamic sit- GOOD+: Takes MAXIMAL challenges from all directions.    Static Stand - GOOD+: Takes MAXIMAL challenges from all directions.    Dynamic stand - n/t    Endurance Deficit: mild                    Functional Status      Functional Mobility:  Bed mobility: I  Roll to left: I  Roll to right: I  Supine to sit: I  Sit to supine: I  Transfers to bed: I  Transfers to toilet: I  Car transfers: I  Wheelchair mobility:  n/a    ADL's:  Feeding: Mod I using right hand  Grooming: I  Always used right hand  Hygiene: I  UB Dressing: I  LB Dressing: Mod I  Increased time difficulty with shoe tying  Toileting: I  Bathing: I    IADL's:  Homecare: mod I with sweeping  Cooking: haven't tried cooking  Laundry: I  Yard work: n/a  Use of telephone: I  Money management: I  Medication management: I  Handwriting:I      CMS Impairment/Limitation/Restriction for FOTO neuro Survey    Therapist reviewed FOTO scores for Lore Shrestha on 3/19/2019.   FOTO documents entered into [employer] - see Media section.    Limitation Score: 55%  Category: Self Care    Current : CK = at least 40% but < 60% impaired, limited or restricted 55%  Goal: CJ = at least 20% but < 40% impaired, limited or restricted 35%  Discharge:          Treatment     Home Exercises and Patient Education Provided    Education provided:   -role of OT, goals for OT, scheduling/cancellations, insurance limitations with patient.  -Additional Education provided: wgt bearing on left hand to assist with ataxic type motions    Written Home Exercises Provided: yes.  Exercises were reviewed and Fady was able to demonstrate them prior to the end of the session.    Fady demonstrated good  understanding of the education provided.     See EMR under Patient Instructions for exercises provided 3/19/2019.    Assessment     Lore Shrestha is a 53 y.o. male referred to outpatient occupational therapy and presents with a medical diagnosis of CVA, resulting in decreased flexibility, decreased range of motion and impaired function, coordination limitations left hand and demonstrates limitations as described in the chart below. Following medical record review it is determined that pt will benefit from occupational therapy services in order to maximize pain free and/or functional use of left UE .    Pt prognosis is Good due to  Recent occurrence of episode  Pt will benefit from skilled outpatient Occupational  Therapy to address the deficits stated above and in the chart below, provide pt/family education, and to maximize pt's level of independence.     Plan of care discussed with patient: Yes  Pt's spiritual, cultural and educational needs considered and patient is agreeable to the plan of care and goals as stated below:     Anticipated Barriers for therapy: none    Medical Necessity is demonstrated by the following  Profile and History Assessment of Occupational Performance Level of Clinical Decision Making Complexity Score   Occupational Profile:   Lore Shrestha is a 53 y.o. 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. Lore Shrestha has difficulty with  dressing  housework/household chores and carrying   affecting his/her daily functional abilities. His/her main goal for therapy is To try to get the left to balance out with the right..     Comorbidities:   diabetes, HIV/AIDS and HTN    Medical and Therapy History Review:   Brief               Performance Deficits    Physical:  Joint Mobility  Muscle Power/Strength   Strength  Pinch Strength  Fine Motor Coordination  Pain    Cognitive:  No Deficits noted    Psychosocial:    No Deficits noted     Clinical Decision Making:  low    Assessment Process:  Problem-Focused Assessments    Modification/Need for Assistance:  Minimal-Moderate Modifications/Assistance    Intervention Selection:  Several Treatment Options       low  Based on PMHX, co morbidities , data from assessments and functional level of assistance required with task and clinical presentation directly impacting function.       The following goals were discussed with the patient and patient is in agreement with them as to be addressed in the treatment plan.     Goals:  Short Term Goals: 4 weeks   Patient will perform his HEP 4 x week  Pt will engage left hand in all self care tasks.  LB dressing will improve to I inclusive of left hand use     LTG GOALS:  Time frame: 8  weeks  Increase AROM left shoulder to allow for reaching to shoulder height for kitchen items  Increase  strength to 60# to assist with carrying groceries and heavy items  Increase left arm to 4+/5 for household chores  Feeding will improve to  I with use of left hand for stabilizing meat   Simple home care will include use of left hand during mopping / sweeping      Plan   Certification Period/Plan of care expiration: 3/19/2019 to 5/14/19.    Outpatient Occupational Therapy 2 times weekly for 8 weeks to include the following interventions: Therapeutic Activites, Therapeutic Exercise and neuromuscular re-ed for left hand coordination.    Shanna Espinoza      I certify the need for these services furnished under this plan of treatment and while under my care.  ____________________________________ Physician/Referring Practitioner   Date of Signature

## 2019-03-25 ENCOUNTER — CLINICAL SUPPORT (OUTPATIENT)
Dept: REHABILITATION | Facility: HOSPITAL | Age: 54
End: 2019-03-25
Attending: HOSPITALIST
Payer: COMMERCIAL

## 2019-03-25 DIAGNOSIS — M62.81 MUSCLE WEAKNESS OF LOWER EXTREMITY: ICD-10-CM

## 2019-03-25 DIAGNOSIS — Z74.09 IMPAIRED FUNCTIONAL MOBILITY, BALANCE, GAIT, AND ENDURANCE: ICD-10-CM

## 2019-03-25 PROCEDURE — 97161 PT EVAL LOW COMPLEX 20 MIN: CPT | Mod: PN

## 2019-03-25 NOTE — PLAN OF CARE
"[facility]  Physical Therapy Neurological Rehabilitation Initial Evaluation    Name: Lore Shrestha  Clinic Number: [id number]    Therapy Diagnosis:   Encounter Diagnoses   Name Primary?    Muscle weakness of lower extremity     Impaired functional mobility, balance, gait, and endurance      Physician: Patricia Boyle,*    Physician Orders: PT Eval and Treat   Medical Diagnosis from Referral: L sided numbness  Evaluation Date: 3/25/2019  Authorization Period Expiration: 12/31/19  Plan of Care Expiration: 5/25/19  Visit # / Visits authorized: 1/ 20    Time In: 0811  Time Out: 0852  Total Billable Time: 41 minutes    Precautions: Diabetes and HIV    Subjective   Date of onset: stroke on 3/11/19  History of current condition - Fady reports: L sided numbness has improved since stroke but continues with some on L side of face. Prior to stroke he was having "stiffness" in L shoulder. He went to have a cortisone shot the Wednesday prior to stroke. He continues with L UE and LE weakness. L knee occasionally gives out and feels "wobbly" but no recent falls.      Medical History:   Past Medical History:   Diagnosis Date    Diabetes mellitus     HIV (human immunodeficiency virus infection)     Hypertension      Surgical History:   Lore Shrestha  has a past surgical history that includes No past surgeries (03/11/2019).    Medications:   Lore has a current medication list which includes the following prescription(s): amitriptyline, aspirin, atorvastatin, darunavir ethanolate, etravirine, hydrochlorothiazide, insulin lispro protamine-insulin lispro, lisinopril, ritonavir, and tenofovir.    Allergies:   Review of patient's allergies indicates:  No Known Allergies     Imaging: 3/12/19 brain MRA: "MRA of the [id number] within normal limits."  3/11/19 brain MRI: "Small focus of acute infarction in the right thalamus.  No evidence of hemorrhagic conversion."  3/12/19 B carotid US: "1. 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  Prior Level of Function: independent with all ADLs, currently driving  Current Level of Function: difficulty with stair ambulation, walking long distances, household chores, squatting/kneeling, floor transfers    Pain:  Current 4/10, worst 7/10, best 0/10   Location: left arm  Description: Aching, Tingling and Numb  Aggravating Factors: using UE  Easing Factors: rest    Pts goals: to strengthen LEs to get back to walking routine    Objective     Observation: pleasant and cooperative     Posture Alignment :forward head  Dominant hand:  right     ROM:   UPPER EXTREMITY--AROM/PROM  (R) UE: WNLs  (L) UE: limited as follows: 50% limited in flexion and abduction     Lower Extremity Strength  Right LE  Left LE    Hip flexion:  5/5 Hip flexion: 4+/5   Knee extension: 5/5 Knee extension: 4+/5   Knee flexion: 5/5 Knee flexion: 4+/5   Ankle dorsiflexion:  5/5 Ankle dorsiflexion: 4/5   Ankle plantarflexion:  5/5 Ankle plantarflexion: 5/5   Hip abduction: 5/5 Hip abduction: 4+/5   Hip adduction: 5/5 Hip adduction 4/5   Hip extension: 4/5 Hip extension: 4+/5     Functional Strength:   -30 sec sit to stand: 4 (decreased functional LE strength)    Bed mobility: mod I with all for increased time    Transfers: sit <> stand transfer with mod I and no UE support from standard chair    Stairs: ascend/descend 5 steps with 0 HRs, reciprocal gait pattern, and SBA    VALDEZ Assessment  1. Sitting to Standin - able to stand without using hands " and stabilize independently  2. Standing Unsupported: 4 - able to stand safely 2 minutes without hold  3. Sitting Unsupported: 4 - able to sit safely and securely 2 minutes  4. Standing to Sittin - sits safely with minimal use of hands  5. Pivot Transfer: 4 - able to transfer safely with minor use of hands  6. Standing with Eyes Closed: 4 - able to stand 10 seconds safely  7. Standing with Feet Together: 4 - able to place feet together independently and stand 1 minute safely  8. Reaching Forward with Outstretched Arm: 4 - can reach forward confidently 25 cm/10 inches  9. Retrieving Object from Floor: 4 - able to  slipper safely and easily  10. Turning to Look Behind: 4 - looks behind from both sides and weights shifts well  11. Turning 360 Degrees: 2 - able to turn 360 safely but slowly  12. Placing Alternate Foot on Step: 4 - able to stand independently safely and complete 8 steps in 20 seconds  13. Standing with One Foot in Front: 4 - able to tandem stand independently and hold 30 seconds  14. Standing on One Foot: 3- able to lift leg independently and hold 5-10 seconds  Total: 53  Maximum: 56  (low fall risk)    DYNAMIC GAIT INDEX  1. Gait level surfaces: 2       2 Mild Impairment Walks 20', uses A.D., slower speed, mild gait deviation     2. Change in Gait Speed: 3      3 Normal Able to smoothly change walking speed without loss of balance or gait deviation. Shows a significant difference in walking speeds between normal, fast and slow speeds.     3:  Gait with Horizontal Head Turns: 2      2 Mild Impairment Performs head turns smoothly with slight change in gait velocity, i.e minor disruptions to smooth gait path or uses a walking aid.      4. Gait with vertical Head turns: 2      2 Mild impairment Performs task with slight change in gait velocity ie. Minor disruption in smooth gait path or uses walking aid.     5.  Gait and pivot turn:  3      3 Normal Pivot turns safely within 3 seconds and stops  quickly with no loss of balance.     6.  Step over Obstacle: 2      2   Mild Impairment Is able to step over box but must slow down and adjust steps to clear box safely     7. Step Around Obstacles:  3      3  Normal  Is able to walk around objects safely without changing gait speed, no evidence of imbalance     8. Steps:  3      3  Normal  Alternating feet, no rail     TOTAL SCORE: 20 / 24  (low fall risk)    Balance Assessment:-Single Leg Stance:    Right : 15+ seconds    Left:  5 seconds    Flexibility:    Hamstring length 90/90: R = WNL; L = -35 degrees    Sensation: B LEs grossly intact to light touch    Gait Assessment:   · AD used: none; Assistance: SBA; Distance: 100'    · GAIT DEVIATIONS:   Lore displays the following deviations with ambulation:    Impairments contributing to deviations: impaired motor control, decreased L heel strike, slight L lateral trunk lean    CMS Impairment/Limitation/Restriction for FOTO Cerebrovascular Disorders Survey    Therapist reviewed FOTO scores for Lore Shrestha on 3/25/2019.   FOTO documents entered into [employer] - see Media section.    Limitation Score: 45%  Category: Mobility    Current : CK = at least 40% but < 60% impaired, limited or restricted  Goal: CJ = at least 20% but < 40% impaired, limited or restricted  Discharge: CK = at least 40% but < 60% impaired, limited or restricted       PT Evaluation Completed? Yes    TREATMENT     No treatment provided this visit.    Education provided:   - importance of LE strengthening and improving motor control    Assessment   Lore is a 53 y.o. male referred to outpatient Physical Therapy with a medical diagnosis of L sided numbness. Pt presents s/p stroke on 3/11/19 with L sided UE and LE weakness, muscle tightness, and impaired, gait, balance, endurance, and coordination. Perales balance score 53/36 indicating low fall risk and 30 sec sit to stand score 4 indicating decreased functional LE strength and endurance.     Pt prognosis is  Good.   Pt will benefit from skilled outpatient Physical Therapy to address the deficits stated above and in the chart below, provide pt/family education, and to maximize pt's level of independence.     Plan of care discussed with patient: Yes  Pt's spiritual, cultural and educational needs considered and patient is agreeable to the plan of care and goals as stated below:     Anticipated Barriers for therapy: none    Medical Necessity is demonstrated by the following  History  Co-morbidities and personal factors that may impact the plan of care Co-morbidities:   diabetes, history of CVA and HIV/AIDS    Personal Factors:   lifestyle     moderate   Examination  Body Structures and Functions, activity limitations and participation restrictions that may impact the plan of care Body Regions:   lower extremities  trunk    Body Systems:    gross symmetry  ROM  strength  gross coordinated movement  balance  gait  transfers  transitions  motor control  motor learning    Participation Restrictions:   ADLs, IADLs, work and domestic duties    Activity limitations:   Learning and applying knowledge  no deficits    General Tasks and Commands  no deficits    Communication  no deficits    Mobility  lifting and carrying objects  fine hand use (grasping/picking up)  walking    Self care  no deficits    Domestic Life  shopping  cooking  doing house work (cleaning house, washing dishes, laundry)    Interactions/Relationships  no deficits    Life Areas  employment    Community and Social Life  recreation and leisure         high   Clinical Presentation stable and uncomplicated low   Decision Making/ Complexity Score: low      1) Impaired functional mobility   2) LE weakness   3) Difficulty walking long distances   4) Decreased balance   5) Lack of HEP    GOALS: Short Term Goals: 4 weeks  1. Pt will increase 30 sec sit to stand score to 10 to indicate improved functional strength.  2. Pt will be able to tolerate multi-directional LE  strengthening in order to improve ability to perform household chores.  3. Pt will report 50% improvement in ability to walk long distances since start of care to indicate improved functional mobility.   4. Pt will hold single leg stance on L for 10 seconds to indicate improved static balance.   5. Pt to tolerate HEP to improve ROM and independence with ADL's    Long Term Goals: 8 weeks  1. Pt will increase DGI score to 22/24 to indicate improved static and dynamic balance.   2. Pt will be able to perform 2 x 10 multi-directional LE strengthening without fatigue in order to improve ability to perform household chores.  3. Pt will report 80% improvement in ability to walk long distances since start of care to indicate improved functional mobility.   4. Pt will hold single leg stance on L for 15 seconds to indicate improved static balance.   5. Pt to be Independent with HEP to improve ROM and independence with ADL's    Plan   Plan of care Certification: 3/25/2019 to 5/25/19.    Outpatient Physical Therapy 2 times weekly for 8 weeks to include the following interventions: Gait Training, Manual Therapy, Moist Heat/ Ice, Neuromuscular Re-ed, Patient Education, Therapeutic Activites and Therapeutic Exercise.     Janett Frazier, PT

## 2019-03-25 NOTE — PROGRESS NOTES
[facility]  Physical Therapy Neurological Rehabilitation Initial Evaluation    See full physical therapy evaluation in POC.     TREATMENT     No treatment provided this visit.    Education provided:   - importance of LE strengthening and improving motor control    Assessment   Lore is a 53 y.o. male referred to outpatient Physical Therapy with a medical diagnosis of L sided numbness. Pt presents s/p stroke on 3/11/19 with L sided UE and LE weakness, muscle tightness, and impaired, gait, balance, endurance, and coordination. Perales balance score 53/36 indicating low fall risk and 30 sec sit to stand score 4 indicating decreased functional LE strength and endurance.     Pt prognosis is Good.   Pt will benefit from skilled outpatient Physical Therapy to address the deficits stated above and in the chart below, provide pt/family education, and to maximize pt's level of independence.     Plan of care discussed with patient: Yes  Pt's spiritual, cultural and educational needs considered and patient is agreeable to the plan of care and goals as stated below:     Anticipated Barriers for therapy: none    Medical Necessity is demonstrated by the following  History  Co-morbidities and personal factors that may impact the plan of care Co-morbidities:   diabetes, history of CVA and HIV/AIDS    Personal Factors:   lifestyle     moderate   Examination  Body Structures and Functions, activity limitations and participation restrictions that may impact the plan of care Body Regions:   lower extremities  trunk    Body Systems:    gross symmetry  ROM  strength  gross coordinated movement  balance  gait  transfers  transitions  motor control  motor learning    Participation Restrictions:   ADLs, IADLs, work and domestic duties    Activity limitations:   Learning and applying knowledge  no deficits    General Tasks and Commands  no deficits    Communication  no deficits    Mobility  lifting and carrying  objects  fine hand use (grasping/picking up)  walking    Self care  no deficits    Domestic Life  shopping  cooking  doing house work (cleaning house, washing dishes, laundry)    Interactions/Relationships  no deficits    Life Areas  employment    Community and Social Life  recreation and leisure         high   Clinical Presentation stable and uncomplicated low   Decision Making/ Complexity Score: low      1) Impaired functional mobility   2) LE weakness   3) Difficulty walking long distances   4) Decreased balance   5) Lack of HEP    GOALS: Short Term Goals: 4 weeks  1. Pt will increase 30 sec sit to stand score to 10 to indicate improved functional strength.  2. Pt will be able to tolerate multi-directional LE strengthening in order to improve ability to perform household chores.  3. Pt will report 50% improvement in ability to walk long distances since start of care to indicate improved functional mobility.   4. Pt will hold single leg stance on L for 10 seconds to indicate improved static balance.   5. Pt to tolerate HEP to improve ROM and independence with ADL's    Long Term Goals: 8 weeks  1. Pt will increase DGI score to 22/24 to indicate improved static and dynamic balance.   2. Pt will be able to perform 2 x 10 multi-directional LE strengthening without fatigue in order to improve ability to perform household chores.  3. Pt will report 80% improvement in ability to walk long distances since start of care to indicate improved functional mobility.   4. Pt will hold single leg stance on L for 15 seconds to indicate improved static balance.   5. Pt to be Independent with HEP to improve ROM and independence with ADL's    Plan   Plan of care Certification: 3/25/2019 to 5/25/19.    Outpatient Physical Therapy 2 times weekly for 8 weeks to include the following interventions: Gait Training, Manual Therapy, Moist Heat/ Ice, Neuromuscular Re-ed, Patient Education, Therapeutic Activites and Therapeutic Exercise.      Janett Frazier, PT

## 2019-03-26 ENCOUNTER — CLINICAL SUPPORT (OUTPATIENT)
Dept: REHABILITATION | Facility: HOSPITAL | Age: 54
End: 2019-03-26
Attending: HOSPITALIST
Payer: COMMERCIAL

## 2019-03-26 DIAGNOSIS — I63.9 LACK OF COORDINATION DUE TO ACUTE CEREBROVASCULAR ACCIDENT (CVA): ICD-10-CM

## 2019-03-26 DIAGNOSIS — M25.60 RANGE OF MOTION DEFICIT: ICD-10-CM

## 2019-03-26 DIAGNOSIS — Z78.9 ALTERATION IN PERFORMANCE OF ACTIVITIES OF DAILY LIVING: ICD-10-CM

## 2019-03-26 DIAGNOSIS — R29.898 LEFT ARM WEAKNESS: ICD-10-CM

## 2019-03-26 DIAGNOSIS — R27.9 LACK OF COORDINATION DUE TO ACUTE CEREBROVASCULAR ACCIDENT (CVA): ICD-10-CM

## 2019-03-26 PROCEDURE — 97110 THERAPEUTIC EXERCISES: CPT | Mod: PN

## 2019-03-26 NOTE — PROGRESS NOTES
Occupational Therapy Daily Treatment Note     Date: 3/26/2019  Name: Lore Shrestha  Clinic Number: [id number]    Therapy Diagnosis:   Encounter Diagnoses   Name Primary?    Range of motion deficit     Left arm weakness     Alteration in performance of activities of daily living     Lack of coordination due to acute cerebrovascular accident (CVA)      Physician: Patricia Boyle,*    Physician Orders: eval and treat  Medical Diagnosis: acute infarction in the right thalamus  Evaluation Date: 3/19/2019  Plan of Care Expiration Period: 3/19/19 - 5/14/19  Insurance Authorization period Expiration: 12/31/19  Date of Return to MD: to new primary physician next week(outside doctor)  Visit # / Visits Authorized: 2 / 20  FOTO: 5th visit     Time In: 9:05 am  Time Out: 9:45 am  Total Billable (one on one) Time: 30 minutes     Precautions: Standard and Diabetes      Subjective     Pt reports:  He is doing okay, feels a little stiff but okay  he was compliant with home exercise program given last session.   Response to previous treatment: no adverse affects  Functional change: none noted    Pain: 0/10  Location: right side      Objective       Fady received therapeutic exercises for 30 minutes including:  -Pt was engaged in sci fit with BUE with verbal cues for sustained RPMs throughout  -seated 2# dowel flexion x 10 reps  -seated 2# dowel chest press x 10 reps  -doroteo wall writing of uppercase letters for proprioceptive input and scapular stabilization  -seated raising of dk green therapy ball overhead x 6 reps  -pass of dk green ball right to left, left to right and overhead x 5 reps  -4# alternating bicep curls x 2 sets 10 reps  -cable cross horizontal rows with 3# resistance x 2 sets 10 rep      Home Exercises and Education Provided     Education provided:   - Progress towards goals     Written Home Exercises Provided: Patient instructed to cont prior HEP.  Exercises were reviewed and Fady was able to  demonstrate them prior to the end of the session.  Fady demonstrated good  understanding of the HEP provided.   .   See EMR under Patient Instructions for exercises provided prior visit.        Assessment     Pt would continue to benefit from skilled OT.  Pt with tiredness upon completion of resistive exercises.  Movements intermittently slowed bilaterally during ex.       Fady is progressing slowly towards his goals and there are no updates to goals at this time. Pt prognosis is Good.     Pt will continue to benefit from skilled outpatient occupational therapy to address the deficits listed in the problem list on initial evaluation provide pt/family education and to maximize pt's level of independence in the home and community environment.     Anticipated barriers to occupational therapy: none noted    Pt's spiritual, cultural and educational needs considered and pt agreeable to plan of care and goals.    Goals:  Short Term Goals: 4 weeks   Patient will perform his HEP 4 x week  Pt will engage left hand in all self care tasks.  LB dressing will improve to I inclusive of left hand use      LTG GOALS:  Time frame: 8 weeks  Increase AROM left shoulder to allow for reaching to shoulder height for kitchen items  Increase  strength to 60# to assist with carrying groceries and heavy items  Increase left arm to 4+/5 for household chores  Feeding will improve to  I with use of left hand for stabilizing meat   Simple home care will include use of left hand during mopping / sweeping      Plan   Continue with plan of care 2 x week x 8 weeks progressing activities and ex as tolerated  Updates/Grading for next session: n/a      ZEHRA Kim

## 2019-03-27 NOTE — PROGRESS NOTES
Occupational Therapy Daily Treatment Note     Date: 3/28/2019  Name: Lore Shrestha  Clinic Number: [id number]    Therapy Diagnosis:   Encounter Diagnoses   Name Primary?    Range of motion deficit     Left arm weakness     Alteration in performance of activities of daily living     Lack of coordination due to acute cerebrovascular accident (CVA)      Physician: Patricia Boyle,*    Physician Orders: eval and treat  Medical Diagnosis: acute infarction in the right thalamus  Evaluation Date: 3/19/2019  Plan of Care Expiration Period: 3/19/19 - 5/14/19  Insurance Authorization period Expiration: 12/31/19  Date of Return to MD: to new primary physician next week(outside doctor)  Visit # / Visits Authorized: 3 / 20  FOTO: 5th visit     Time In: 9:03 am  Time Out: 10:00 am  Total Billable (one on one) Time: 57 minutes     Precautions: Standard and Diabetes, HIV+      Subjective     Pt reports:  His right hand is hurting like a sprain in his MP joint region.  He also had some episode in his right foot 1st and 2nd toe feeling like a cramp but it wasn't. Pt has no c/o his involved left side.  he was compliant with home exercise program given last session.   Response to previous treatment: no adverse affects  Functional change: might see a little change in his left    Pain:  8/10  Location: right hand      Objective       Fady received therapeutic exercises for 57 minutes including:  -Pt was engaged in sci fit with BUE with verbal cues for sustained RPMs throughout  -wgt bearing on ball with BUE push x 10 reps holding   -seated raising of  red therapy ball overhead x 10 reps  -4# alternating bicep curls x 2 sets 10 reps  -4# supination/pronation hold bell of dumbbell x 2 sets 10 reps  -4 kg plyoball bounce on horizontal tramp with left hand catch palm down x 30 reps  -4kg plyoball bounce on horizontal tramp alternate hand bounce and catch x 30 cycles  -4kg ball bounce clap x 1  catch x 30 reps  -9kg ball bounce  clap x 2 catch 20 reps  7# ball chest pass to rebounder x 20 reps  7# ball overhead pass to rebounder x 20 reps  Ultra gripper setting # 2 x 4 min  Discs removed from Disc tree with 2# wrist cuff wgt  Matrix- chest press 10# 2 sets 10 reps              rowsl 25# 2 sets 10 reps    Home Exercises and Education Provided     Education provided:   - Progress towards goals     Written Home Exercises Provided: Patient instructed to cont prior HEP.  Exercises were reviewed and Fady was able to demonstrate them prior to the end of the session.  Fady demonstrated good  understanding of the HEP provided.   .   See EMR under Patient Instructions for exercises provided prior visit.        Assessment     Pt would continue to benefit from skilled OT.  Pt demonstrating good control of LUE with all exercises/activities.  Initial coordination issue with ball bouncing but improved quickly. Appropriate tiredness after therapy.    Fady is progressing slowly towards his goals and there are no updates to goals at this time. Pt prognosis is Good.     Pt will continue to benefit from skilled outpatient occupational therapy to address the deficits listed in the problem list on initial evaluation provide pt/family education and to maximize pt's level of independence in the home and community environment.     Anticipated barriers to occupational therapy: none noted    Pt's spiritual, cultural and educational needs considered and pt agreeable to plan of care and goals.    Goals:  Short Term Goals: 4 weeks   Patient will perform his HEP 4 x week  Pt will engage left hand in all self care tasks.  LB dressing will improve to I inclusive of left hand use      LTG GOALS:  Time frame: 8 weeks  Increase AROM left shoulder to allow for reaching to shoulder height for kitchen items  Increase  strength to 60# to assist with carrying groceries and heavy items  Increase left arm to 4+/5 for household chores  Feeding will improve to  I with use of  left hand for stabilizing meat   Simple home care will include use of left hand during mopping / sweeping      Plan   Continue with plan of care 2 x week x 8 weeks progressing activities and ex as tolerated  Updates/Grading for next session: n/a      ZEHRA Kim

## 2019-03-28 ENCOUNTER — CLINICAL SUPPORT (OUTPATIENT)
Dept: REHABILITATION | Facility: HOSPITAL | Age: 54
End: 2019-03-28
Attending: HOSPITALIST
Payer: COMMERCIAL

## 2019-03-28 DIAGNOSIS — R27.9 LACK OF COORDINATION DUE TO ACUTE CEREBROVASCULAR ACCIDENT (CVA): ICD-10-CM

## 2019-03-28 DIAGNOSIS — R29.898 LEFT ARM WEAKNESS: ICD-10-CM

## 2019-03-28 DIAGNOSIS — Z78.9 ALTERATION IN PERFORMANCE OF ACTIVITIES OF DAILY LIVING: ICD-10-CM

## 2019-03-28 DIAGNOSIS — M25.60 RANGE OF MOTION DEFICIT: ICD-10-CM

## 2019-03-28 DIAGNOSIS — I63.9 LACK OF COORDINATION DUE TO ACUTE CEREBROVASCULAR ACCIDENT (CVA): ICD-10-CM

## 2019-03-28 PROCEDURE — 97110 THERAPEUTIC EXERCISES: CPT | Mod: PN

## 2019-04-01 ENCOUNTER — CLINICAL SUPPORT (OUTPATIENT)
Dept: REHABILITATION | Facility: HOSPITAL | Age: 54
End: 2019-04-01
Attending: HOSPITALIST
Payer: COMMERCIAL

## 2019-04-01 DIAGNOSIS — M25.60 RANGE OF MOTION DEFICIT: ICD-10-CM

## 2019-04-01 DIAGNOSIS — Z78.9 ALTERATION IN PERFORMANCE OF ACTIVITIES OF DAILY LIVING: ICD-10-CM

## 2019-04-01 DIAGNOSIS — R29.898 LEFT ARM WEAKNESS: ICD-10-CM

## 2019-04-01 DIAGNOSIS — R27.9 LACK OF COORDINATION DUE TO ACUTE CEREBROVASCULAR ACCIDENT (CVA): ICD-10-CM

## 2019-04-01 DIAGNOSIS — I63.9 LACK OF COORDINATION DUE TO ACUTE CEREBROVASCULAR ACCIDENT (CVA): ICD-10-CM

## 2019-04-01 PROCEDURE — 97110 THERAPEUTIC EXERCISES: CPT | Mod: PN

## 2019-04-01 NOTE — PROGRESS NOTES
Occupational Therapy Daily Treatment Note     Date: 4/1/2019  Name: Lore Shrestha  Clinic Number: [id number]    Therapy Diagnosis:   Encounter Diagnoses   Name Primary?    Range of motion deficit     Left arm weakness     Alteration in performance of activities of daily living     Lack of coordination due to acute cerebrovascular accident (CVA)      Physician: Patricia Boyle,*    Physician Orders: eval and treat  Medical Diagnosis: acute infarction in the right thalamus  Evaluation Date: 3/19/2019  Plan of Care Expiration Period: 3/19/19 - 5/14/19  Insurance Authorization period Expiration: 12/31/19  Date of Return to MD: to new primary physician next week(outside doctor)  Visit # / Visits Authorized: 4 / 20  FOTO: 5th visit     Time In: 11:00  am  Time Out: 11:57  am  Total Billable (one on one) Time: 57 minutes     Precautions: Standard and Diabetes, HIV+      Subjective     Pt reports:  His right hand is hurting like a sprain in his MP joint region.  He also had some episode in his right foot 1st and 2nd toe feeling like a cramp but it wasn't. Pt has no c/o his involved left side.  he was compliant with home exercise program given last session.   Response to previous treatment: no adverse affects  Functional change: might see a little change in his left    Pain:  8/10  Location: right hand      Objective       Fady received therapeutic exercises for 57 minutes including:  -Pt was engaged in sci fit with BUE with verbal cues for sustained RPMs throughout  -wgt bearing on parallel bars x 10 reps x 5 seconds  -quadraped wgt bearing with gliders under left hand for h. Abd/adduction x 10 reps                                                            Under both hands for alternating flexion x 10 cycles                                                             Under both hands for alternating scaption x 10 cycless  Horizontal tramp:  500gram ball bounce catch left handed x 30 reps                                                 Alternating hands bounce/catch x 30 reps                                             --6kg ball bounce clap x 2 catch 30 reps  Manipulation of chinese meditation balls x 2 min  Hammer twist with 1# added x 4 min  [place] therapy putty squeeze and manipulation x 4 min      Home Exercises and Education Provided     Education provided:   - Progress towards goals     Written Home Exercises Provided: Patient instructed to cont prior HEP.  Exercises were reviewed and Fady was able to demonstrate them prior to the end of the session.  Fady demonstrated good  understanding of the HEP provided.   .   See EMR under Patient Instructions for exercises provided 3/19/19.        Assessment     Pt would continue to benefit from skilled OT.  Pt engaged in all exercises in session, appropriate tiredness noted after therapy.  Pt with mild difficulty with manipulation tasks left hand    Fady is progressing slowly towards his goals and there are no updates to goals at this time. Pt prognosis is Good.     Pt will continue to benefit from skilled outpatient occupational therapy to address the deficits listed in the problem list on initial evaluation provide pt/family education and to maximize pt's level of independence in the home and community environment.     Anticipated barriers to occupational therapy: none noted    Pt's spiritual, cultural and educational needs considered and pt agreeable to plan of care and goals.    Goals:  Short Term Goals: 4 weeks   Patient will perform his HEP 4 x week  Pt will engage left hand in all self care tasks.  LB dressing will improve to I inclusive of left hand use      LTG GOALS:  Time frame: 8 weeks  Increase AROM left shoulder to allow for reaching to shoulder height for kitchen items  Increase  strength to 60# to assist with carrying groceries and heavy items  Increase left arm to 4+/5 for household chores  Feeding will improve to  I with use of left hand for  stabilizing meat   Simple home care will include use of left hand during mopping / sweeping      Plan   Continue with plan of care 2 x week x 8 weeks progressing activities and ex as tolerated  Updates/Grading for next session: n/a      ZEHRA Kim

## 2019-04-03 ENCOUNTER — CLINICAL SUPPORT (OUTPATIENT)
Dept: REHABILITATION | Facility: HOSPITAL | Age: 54
End: 2019-04-03
Attending: HOSPITALIST
Payer: COMMERCIAL

## 2019-04-03 DIAGNOSIS — R27.9 LACK OF COORDINATION DUE TO ACUTE CEREBROVASCULAR ACCIDENT (CVA): ICD-10-CM

## 2019-04-03 DIAGNOSIS — M25.60 RANGE OF MOTION DEFICIT: ICD-10-CM

## 2019-04-03 DIAGNOSIS — M62.81 MUSCLE WEAKNESS OF LOWER EXTREMITY: ICD-10-CM

## 2019-04-03 DIAGNOSIS — I63.9 LACK OF COORDINATION DUE TO ACUTE CEREBROVASCULAR ACCIDENT (CVA): ICD-10-CM

## 2019-04-03 DIAGNOSIS — R29.898 LEFT ARM WEAKNESS: ICD-10-CM

## 2019-04-03 DIAGNOSIS — Z78.9 ALTERATION IN PERFORMANCE OF ACTIVITIES OF DAILY LIVING: ICD-10-CM

## 2019-04-03 DIAGNOSIS — Z74.09 IMPAIRED FUNCTIONAL MOBILITY, BALANCE, GAIT, AND ENDURANCE: ICD-10-CM

## 2019-04-03 PROCEDURE — 97110 THERAPEUTIC EXERCISES: CPT | Mod: PN

## 2019-04-03 NOTE — PROGRESS NOTES
Occupational Therapy Daily Treatment Note     Date: 4/3/2019  Name: Lore Shrestha  Clinic Number: [id number]    Therapy Diagnosis:   Encounter Diagnoses   Name Primary?    Range of motion deficit     Left arm weakness     Alteration in performance of activities of daily living     Lack of coordination due to acute cerebrovascular accident (CVA)      Physician: Patricia Boyle,*    Physician Orders: eval and treat  Medical Diagnosis: acute infarction in the right thalamus  Evaluation Date: 3/19/2019  Plan of Care Expiration Period: 3/19/19 - 5/14/19  Insurance Authorization period Expiration: 12/31/19  Date of Return to MD: to new primary physician next week(outside doctor)  Visit # / Visits Authorized: 5 / 20  FOTO: 5th visit     Time In: 8:56  am  Time Out: 9:35  am  Total Billable (one on one) Time: 39 minutes     Precautions: Standard and Diabetes, HIV+      Subjective     Pt reports:  His is doing okay, left side is feeling a little better.  He is having a little right low back pain from sitting too long in front of the computer  he was compliant with home exercise program given last session.   Response to previous treatment: no adverse affects  Functional change: able to push up a little on his left hand    Pain:  0/10  Location: right hand      Objective       Fady received therapeutic exercises for 39 minutes including:  -Pt was engaged in sci fit with BUE with verbal cues for sustained RPMs throughout  -4# bicep curls x 3 sets of 10 reps  -4# curls to 90 abduction x 2 sets 10 reps   -4# overhead press x   doroteo scapular wall stabilization with left hand writing numbers 1-20  Matrix chest press 15# x 2 sets 10 reps, single arm chest press 10# x 10 reps  Pronation/supination holding bell of 3# dumbbell x 3 min  Manipulation of chinese meditation balls x 2 min    [place] therapy putty squeeze and manipulation x 4 min      Home Exercises and Education Provided     Education provided:   - Progress  towards goals     Written Home Exercises Provided: Patient instructed to cont prior HEP.  Exercises were reviewed and Fady was able to demonstrate them prior to the end of the session.  Fady demonstrated good  understanding of the HEP provided.   .   See EMR under Patient Instructions for exercises provided 3/19/19.      CMS Impairment/Limitation/Restriction for FOTO neuro Survey     Therapist reviewed FOTO scores for Lore Shrestha on 3/19/2019.   FOTO documents entered into [employer] - see Media section.     Limitation Score: 55%  Category: Self Care     Current : CK = at least 40% but < 60% impaired, limited or restricted 55%  Goal: CJ = at least 20% but < 40% impaired, limited or restricted 35%  Discharge:    Assessment     Pt would continue to benefit from skilled OT. Pt with good performance of resistive ex with LUE only slight scapular compensations noted.  Pt is attempting to engage left hand in lighter activities at home.     Fady is progressing slowly towards his goals and there are no updates to goals at this time. Pt prognosis is Good.     Pt will continue to benefit from skilled outpatient occupational therapy to address the deficits listed in the problem list on initial evaluation provide pt/family education and to maximize pt's level of independence in the home and community environment.     Anticipated barriers to occupational therapy: none noted    Pt's spiritual, cultural and educational needs considered and pt agreeable to plan of care and goals.    Goals:  Short Term Goals: 4 weeks   Patient will perform his HEP 4 x week  Pt will engage left hand in all self care tasks.  LB dressing will improve to I inclusive of left hand use      LTG GOALS:  Time frame: 8 weeks  Increase AROM left shoulder to allow for reaching to shoulder height for kitchen items  Increase  strength to 60# to assist with carrying groceries and heavy items  Increase left arm to 4+/5 for household chores  Feeding will  improve to  I with use of left hand for stabilizing meat   Simple home care will include use of left hand during mopping / sweeping      Plan   Continue with plan of care 2 x week x 8 weeks progressing activities and ex as tolerated  Updates/Grading for next session: n/a      ZEHRA Kim

## 2019-04-03 NOTE — PROGRESS NOTES
Physical Therapy Daily Treatment Note     Name: Lore Shrestha  Clinic Number: [id number]    Therapy Diagnosis:   Encounter Diagnoses   Name Primary?    Muscle weakness of lower extremity     Impaired functional mobility, balance, gait, and endurance      Physician: Patricia Boyle,Cornel    Visit Date: 4/3/2019    Physician Orders: PT Eval and Treat   Medical Diagnosis from Referral: L sided numbness  Evaluation Date: 3/25/2019  Authorization Period Expiration: 12/31/19  Plan of Care Expiration: 5/25/19  Visit # / Visits authorized: 2/ 20       Time In: 0805  Time Out: 0855  Total Billable Time: 30 minutes    Precautions: Diabetes and HIV      Subjective     Pt reports: that he is feeling so so today.  Pt reports increased low back pain today.   He was compliant with home exercise program.  Response to previous treatment: fine  Functional change: none to report     Pain: 9/10  Location: bilateral back      Objective     Fady received therapeutic exercises to develop strength, endurance, ROM, flexibility, posture and core stabilization for 33 minutes including:    Nustep x 6'   Standing heel raises 2 x 10   Standing Hip abduction 2 x 10  Step ups 6'' step   LTR x 2'   HL hip abduction Green TB x 20   Bridges 2 x 10   SLR x 10 ea LE   Seated LAQ with ball squeeze 20 x 3'' hold       Fady participated in neuromuscular re-education activities to improve: Balance for 2 minutes. The following activities were included:    NBOS on airex 1 x 30'' EO, 1 x 30'' EC        Fady participated in dynamic functional therapeutic activities to improve functional performance for 5  minutes, including:      Sit <> stands 2 x 10 on 3rd plyo box       Fady participated in gait training to improve functional mobility and safety for 0  minutes, including:        Fady received hot pack for 10 minutes to low back .          Home Exercises Provided and Patient Education Provided     Education provided:   - compliance with  HEP    Written Home Exercises Provided: Patient instructed to cont prior HEP.  Exercises were reviewed and Fady was able to demonstrate them prior to the end of the session.  Fady demonstrated good  understanding of the education provided.     See EMR under Patient Instructions for exercises provided prior visit.    Assessment     Fady tolerated treatment session well.  Patient with good tolerance to exercises with appropriate training effect achieved.  Pt with improvements from eval being able to perform 10 sit<>stands in 30 second time frame.  Patient with decreased endurance requiring rest breaks in between activities.     Fady is progressing well towards his goals.   Pt prognosis is Good.     Pt will continue to benefit from skilled outpatient physical therapy to address the deficits listed in the problem list box on initial evaluation, provide pt/family education and to maximize pt's level of independence in the home and community environment.     Pt's spiritual, cultural and educational needs considered and pt agreeable to plan of care and goals.     Anticipated barriers to physical therapy: none    GOALS: Short Term Goals: 4 weeks  1. Pt will increase 30 sec sit to stand score to 10 to indicate improved functional strength.  2. Pt will be able to tolerate multi-directional LE strengthening in order to improve ability to perform household chores.  3. Pt will report 50% improvement in ability to walk long distances since start of care to indicate improved functional mobility.   4. Pt will hold single leg stance on L for 10 seconds to indicate improved static balance.   5. Pt to tolerate HEP to improve ROM and independence with ADL's     Long Term Goals: 8 weeks  1. Pt will increase DGI score to 22/24 to indicate improved static and dynamic balance.   2. Pt will be able to perform 2 x 10 multi-directional LE strengthening without fatigue in order to improve ability to perform household chores.  3. Pt  will report 80% improvement in ability to walk long distances since start of care to indicate improved functional mobility.   4. Pt will hold single leg stance on L for 15 seconds to indicate improved static balance.   5. Pt to be Independent with HEP to improve ROM and independence with ADL's      Plan     Continue with current POC.     Laurie Culver, PTA

## 2019-04-05 ENCOUNTER — CLINICAL SUPPORT (OUTPATIENT)
Dept: REHABILITATION | Facility: HOSPITAL | Age: 54
End: 2019-04-05
Attending: HOSPITALIST
Payer: COMMERCIAL

## 2019-04-05 DIAGNOSIS — M62.81 MUSCLE WEAKNESS OF LOWER EXTREMITY: ICD-10-CM

## 2019-04-05 DIAGNOSIS — Z74.09 IMPAIRED FUNCTIONAL MOBILITY, BALANCE, GAIT, AND ENDURANCE: ICD-10-CM

## 2019-04-05 PROCEDURE — 97110 THERAPEUTIC EXERCISES: CPT | Mod: PN

## 2019-04-05 PROCEDURE — 97112 NEUROMUSCULAR REEDUCATION: CPT | Mod: PN

## 2019-04-10 ENCOUNTER — CLINICAL SUPPORT (OUTPATIENT)
Dept: REHABILITATION | Facility: HOSPITAL | Age: 54
End: 2019-04-10
Attending: HOSPITALIST
Payer: COMMERCIAL

## 2019-04-10 DIAGNOSIS — Z78.9 ALTERATION IN PERFORMANCE OF ACTIVITIES OF DAILY LIVING: ICD-10-CM

## 2019-04-10 DIAGNOSIS — M62.81 MUSCLE WEAKNESS OF LOWER EXTREMITY: ICD-10-CM

## 2019-04-10 DIAGNOSIS — Z74.09 IMPAIRED FUNCTIONAL MOBILITY, BALANCE, GAIT, AND ENDURANCE: ICD-10-CM

## 2019-04-10 DIAGNOSIS — I63.9 LACK OF COORDINATION DUE TO ACUTE CEREBROVASCULAR ACCIDENT (CVA): ICD-10-CM

## 2019-04-10 DIAGNOSIS — R27.9 LACK OF COORDINATION DUE TO ACUTE CEREBROVASCULAR ACCIDENT (CVA): ICD-10-CM

## 2019-04-10 DIAGNOSIS — R29.898 LEFT ARM WEAKNESS: ICD-10-CM

## 2019-04-10 DIAGNOSIS — M25.60 RANGE OF MOTION DEFICIT: ICD-10-CM

## 2019-04-10 PROCEDURE — 97110 THERAPEUTIC EXERCISES: CPT | Mod: PN

## 2019-04-10 PROCEDURE — 97112 NEUROMUSCULAR REEDUCATION: CPT | Mod: PN

## 2019-04-10 NOTE — PROGRESS NOTES
Occupational Therapy Progress Note     Date: 4/10/2019  Name: Lore Shrestha  Clinic Number: [id number]    Therapy Diagnosis:   Encounter Diagnoses   Name Primary?    Range of motion deficit     Left arm weakness     Alteration in performance of activities of daily living     Lack of coordination due to acute cerebrovascular accident (CVA)      Physician: Patricia Boyle,*    Physician Orders: eval and treat  Medical Diagnosis: acute infarction in the right thalamus  Evaluation Date: 3/19/2019  Plan of Care Expiration Period: 3/19/19 - 5/14/19  Insurance Authorization period Expiration: 12/31/19  Date of Return to MD: to new primary physician next week(outside doctor)  Visit # / Visits Authorized: 6 / 20  FOTO: 5th visit     Time In: 9:10 am  Time Out: 10:00  am  Total Billable (one on one) Time: 50 minutes     Precautions: Standard and Diabetes, HIV+      Subjective     Pt reports:  His has some muscle soreness in his forearms secondary to his weighted ex at home.   he was compliant with home exercise program given last session.   Response to previous treatment: forearm soreness  Functional change: able carry a little better    Pain:  5/10 sore  Location: left and right wrists     Objective       Fady received therapeutic exercises for 50 minutes including:  -Pt was engaged in sci fit with BUE with verbal cues for sustained RPMs throughout  -6kg ball bilateral hand bounce x 30 reps                Bounce clap once x 30 reps                Bounce clap twice x 30 reps  1kg bounce with left hand x 30 reps         Alternating right left bounce catch x 30 reps  Matrix chest press 20#  X 2 sets 10 reps             tricep curls 35# x 2 sets 10 reps    Joint Evaluation  AROM  3/19/2019 AROM    4/10/19 AROM    PROM         Left Left       Shoulder flex 0-180 90    120       Shoulder Abd 0-180 60    115       Shoulder ER 0-90 45    60       Shoulder IR 0-90 45 to hip    60 upper glut       Shoulder Extension 0-80  WFL         Shoulder Horizontal adduction 0-90 WFL         Elbow flex/ext 0-150 WFL         Wrist flex 0-80 WFL         Wrist ext 0-70 WFL         Supination 0-80 WFL         Pronation 0-80 WFL            Fist: normal, able to oppose to all tips and 5th MCP heads bilaterally     Right UE AROM all joints WFL        Strength 3/19/2019    4/10/19 3/19/2019   **within available ROM** Left              Left  Right   Shoulder flex 4+/5 5/5   Shoulder abd 4+/5 5/5   Shoulder ER 4/5                4+/5 5/5   Shoulder IR 4/5                4+/5 5/5   Shoulder Extension 4+/5 5/5   Shoulder Horizontal adduction 4+/5 5/5   Elbow flex 4+/5 5/5   Elbow ext 4+/5 5/5   Wrist flex 4+/5 5/5   Wrist ext 4+/5 5/5   Supination 4/5                4+/5 5/5   Pronation 4/5                4+/5 5/5       Strength: (KAYKAY Dynamometer in lbs.) Average 1 trials, Position II:       3/19/2019 4/10/19 3/19/2019     Left Left  Right   Rung II 47.6# 85.7 109.4#      Pinch Strength (Measured in psi)       3/19/2019 4/10/19 3/19/2019     Left Left  Right   Key Pinch 17 psi 22 psi 26 psi   3pt Pinch 10 psi 18.6 psi 25 psi      Fine Motor Coordination: 9 Hole Peg Test  Left 3/19/2019 Left 4/10/19 Right 3/19/2019   34 seconds 2 drops   30 seconds 1 drop   22 seconds no drops      Manipulation of chinese meditation balls x 3 min    Stayton therapy putty squeeze and manipulation x 4 min      Home Exercises and Education Provided     Education provided:   - Progress towards goals     Written Home Exercises Provided: Patient instructed to cont prior HEP.  Exercises were reviewed and Fady was able to demonstrate them prior to the end of the session.  Fady demonstrated good  understanding of the HEP provided.   .   See EMR under Patient Instructions for exercises provided 3/19/19.      CMS Impairment/Limitation/Restriction for FOTO neuro Survey     Therapist reviewed FOTO scores for Lore Shrestha on 3/19/2019.   FOTO documents entered into [employer] - see  Media section.     Limitation Score: 55%  Category: Self Care     Current : CK = at least 40% but < 60% impaired, limited or restricted 55%  Goal: CJ = at least 20% but < 40% impaired, limited or restricted 35%  Discharge:    Assessment     Pt would continue to benefit from skilled OT.  Pt is progressing well with his arm,  and pinch strengths as noted above.  Pt continues with manipulation difficulty with his left hand.  Fady is progressing well  towards his goals and there are no updates to goals at this time. Pt prognosis is Good.     Pt will continue to benefit from skilled outpatient occupational therapy to address the deficits listed in the problem list on initial evaluation provide pt/family education and to maximize pt's level of independence in the home and community environment.     Anticipated barriers to occupational therapy: none noted    Pt's spiritual, cultural and educational needs considered and pt agreeable to plan of care and goals.    Goals:  Short Term Goals: 4 weeks   Patient will perform his HEP 4 x week- met 4/10/19  Pt will engage left hand in all self care tasks - met 4/10/19.  LB dressing will improve to I inclusive of left hand use - met 4/10/19     LTG GOALS:  Time frame: 8 weeks  Increase AROM left shoulder to allow for reaching to shoulder height for kitchen items  Increase  strength to 60# to assist with carrying groceries and heavy items  Increase left arm to 4+/5 for household chores- met 4/10/19  Feeding will improve to  I with use of left hand for stabilizing meat   Simple home care will include use of left hand during mopping / sweeping      Plan   Continue with plan of care 2 x week x 8 weeks progressing activities and ex as tolerated  Updates/Grading for next session: n/a      ZEHRA Kim

## 2019-04-10 NOTE — PROGRESS NOTES
Physical Therapy Daily Treatment Note     Name: Lore Shrestha  Clinic Number: [id number]    Therapy Diagnosis:   Encounter Diagnoses   Name Primary?    Muscle weakness of lower extremity     Impaired functional mobility, balance, gait, and endurance      Physician: Patricia Boyle,Cornel    Visit Date: 4/10/2019    Physician Orders: PT Eval and Treat   Medical Diagnosis from Referral: L sided numbness  Evaluation Date: 3/25/2019  Authorization Period Expiration: 12/31/19  Plan of Care Expiration: 5/25/19  Visit # / Visits authorized: 3/ 20       Time In: 1000  Time Out: 1100   Total Billable Time: 30 minutes    Precautions: Diabetes and HIV      Subjective     Pt reports: that he is doing good today.  He was compliant with home exercise program.  Response to previous treatment: good   Functional change: none to report     Pain: 2/10  Location: bilateral back      Objective     Fady received therapeutic exercises to develop strength, endurance, ROM, flexibility, posture and core stabilization for 35 minutes including:    Nustep x 8'  [place] level 3     Standing Hip abduction 3 x 10 x 3#  Mini Squats 2 x 10   Step ups 6'' step x 20 ea LE   LTR x 2'   HL hip abduction Green TB x 20   Bridges 3 x 10 with ball squeeze   SLR 2 x 10 ea LE   Seated LAQ with ball squeeze 20 x 3'' hold       Fady participated in neuromuscular re-education activities to improve: Balance for 20 minutes. The following activities were included:    Fwd stepping over small berry x 20 ea LE   + Lateral stepping over small berry x 20 ea LE   Vertical HT Tandem Stance on Airex  2 x 1'    Horizontal HT NBOS on Airex 2 x 1'     Fady participated in dynamic functional therapeutic activities to improve functional performance for 5  minutes, including:      Sit <> stands 2 x 10 on 3rd plyo box       Fady received hot pack for 0 minutes to low back .        Home Exercises Provided and Patient Education Provided     Education provided:   -  compliance with HEP    Written Home Exercises Provided: Patient instructed to cont prior HEP.  Exercises were reviewed and Fady was able to demonstrate them prior to the end of the session.  Fady demonstrated good  understanding of the education provided.     See EMR under Patient Instructions for exercises provided prior visit.    Assessment     Fady tolerated treatment session well.  Patient with good tolerance to exercises with appropriate training effect achieved.  Patient with slight circumduction observed with lateral stepping over small berry, requiring cues to correct.   Fady is progressing well towards his goals.   Pt prognosis is Good.     Pt will continue to benefit from skilled outpatient physical therapy to address the deficits listed in the problem list box on initial evaluation, provide pt/family education and to maximize pt's level of independence in the home and community environment.     Pt's spiritual, cultural and educational needs considered and pt agreeable to plan of care and goals.     Anticipated barriers to physical therapy: none    GOALS: Short Term Goals: 4 weeks  1. Pt will increase 30 sec sit to stand score to 10 to indicate improved functional strength.  - ongoing  2. Pt will be able to tolerate multi-directional LE strengthening in order to improve ability to perform household chores.   ongoing  3. Pt will report 50% improvement in ability to walk long distances since start of care to indicate improved functional mobility.   ongoing  4. Pt will hold single leg stance on L for 10 seconds to indicate improved static balance.   ongoing  5. Pt to tolerate HEP to improve ROM and independence with ADL's   ongoing     Long Term Goals: 8 weeks  1. Pt will increase DGI score to 22/24 to indicate improved static and dynamic balance.   ongoing  2. Pt will be able to perform 2 x 10 multi-directional LE strengthening without fatigue in order to improve ability to perform household chores.   ongoing  3. Pt will report 80% improvement in ability to walk long distances since start of care to indicate improved functional mobility.   ongoing  4. Pt will hold single leg stance on L for 15 seconds to indicate improved static balance.   ongoing  5. Pt to be Independent with HEP to improve ROM and independence with ADL's  ongoing      Plan     Continue with current POC.     Laurie Culver, PTA

## 2019-04-11 NOTE — PROGRESS NOTES
Occupational Therapy Daily Treatment Note     Date: 4/12/2019  Name: Lore Shrestha  Clinic Number: [id number]    Therapy Diagnosis:   Encounter Diagnoses   Name Primary?    Range of motion deficit     Left arm weakness     Alteration in performance of activities of daily living     Lack of coordination due to acute cerebrovascular accident (CVA)      Physician: Patricia Boyle,*    Physician Orders: eval and treat  Medical Diagnosis: acute infarction in the right thalamus  Evaluation Date: 3/19/2019  Plan of Care Expiration Period: 3/19/19 - 5/14/19  Insurance Authorization period Expiration: 12/31/19  Date of Return to MD: to new primary physician next week(outside doctor)  Visit # / Visits Authorized: 7 / 20  FOTO: 5th visit     Time In:  8:00  am  Time Out:  8:58 am  Total Billable (one on one) Time:  58 minutes     Precautions: Standard and Diabetes, HIV+      Subjective     Pt reports:  I am doing okay today  he was compliant with home exercise program given last session.   Response to previous treatment: no adverse affects  Functional change: reaching his back a little bit better during shower    Pain:  0/10 sore  Location: left and right wrists     Objective       Fady received therapeutic exercises for 58 minutes including:  -Pt was engaged in sci fit with BUE with verbal cues for sustained RPMs and directional changes every minute  -Matrix chest press 20# x 3 sets 10 reps  -3# cuff wgt stacking cups to eye level shelf x 12 f/b placing on lower shelf                       Placing cups on overhead shelf x 12 f/b placing on lower shelf  500 gram ball rebounder throw and catch with left hand unsuccessful                                          Attempted throwing right catching left unsuccessful as well  7# ball overhead throw to rebounder x 30 reps  Cable cross vertical and horizontal rows with 7# x 3 min each  Ultra gripper with left hand setting # 3 x 4 min                            Red clip 3pt  pinch x 4 min  Cutting green therapy putty with a fork and knife into 16 pieces        Pieces were manipulated with left hand into round balls    Home Exercises and Education Provided     Education provided:   - Progress towards goals     Written Home Exercises Provided: Patient instructed to cont prior HEP.  Exercises were reviewed and Fady was able to demonstrate them prior to the end of the session.  Fady demonstrated good  understanding of the HEP provided.   .   See EMR under Patient Instructions for exercises provided 3/19/19.      CMS Impairment/Limitation/Restriction for FOTO neuro Survey     Therapist reviewed FOTO scores for Lore Shrestha on 3/19/2019.   FOTO documents entered into [id number] - see Media section.     Limitation Score: 55%  Category: Self Care     Current : CK = at least 40% but < 60% impaired, limited or restricted 55%  Goal: CJ = at least 20% but < 40% impaired, limited or restricted 35%  Discharge:    Assessment     Pt would continue to benefit from skilled OT.  Pt tolerated exercise well some tiredness upon completion of session.  Pt has overall poor gross motor coordination with throwing tasks which seems to be historical and not related to his stroke.  Pt engages during therapy sessions.  Fady is progressing well  towards his goals and there are no updates to goals at this time. Pt prognosis is Good.     Pt will continue to benefit from skilled outpatient occupational therapy to address the deficits listed in the problem list on initial evaluation provide pt/family education and to maximize pt's level of independence in the home and community environment.     Anticipated barriers to occupational therapy: none noted    Pt's spiritual, cultural and educational needs considered and pt agreeable to plan of care and goals.    Goals:  Short Term Goals: 4 weeks   Patient will perform his HEP 4 x week- met 4/10/19  Pt will engage left hand in all self care tasks - met 4/10/19.  LB dressing  will improve to I inclusive of left hand use - met 4/10/19     LTG GOALS:  Time frame: 8 weeks  Increase AROM left shoulder to allow for reaching to shoulder height for kitchen items  Increase  strength to 60# to assist with carrying groceries and heavy items  Increase left arm to 4+/5 for household chores- met 4/10/19  Feeding will improve to  I with use of left hand for stabilizing meat   Simple home care will include use of left hand during mopping / sweeping      Plan   Continue with plan of care 2 x week x 8 weeks progressing activities and ex as tolerated  Updates/Grading for next session: n/a      ZEHRA Kim

## 2019-04-12 ENCOUNTER — CLINICAL SUPPORT (OUTPATIENT)
Dept: REHABILITATION | Facility: HOSPITAL | Age: 54
End: 2019-04-12
Attending: HOSPITALIST
Payer: COMMERCIAL

## 2019-04-12 DIAGNOSIS — I63.9 LACK OF COORDINATION DUE TO ACUTE CEREBROVASCULAR ACCIDENT (CVA): ICD-10-CM

## 2019-04-12 DIAGNOSIS — R27.9 LACK OF COORDINATION DUE TO ACUTE CEREBROVASCULAR ACCIDENT (CVA): ICD-10-CM

## 2019-04-12 DIAGNOSIS — Z74.09 IMPAIRED FUNCTIONAL MOBILITY, BALANCE, GAIT, AND ENDURANCE: ICD-10-CM

## 2019-04-12 DIAGNOSIS — M25.60 RANGE OF MOTION DEFICIT: ICD-10-CM

## 2019-04-12 DIAGNOSIS — Z78.9 ALTERATION IN PERFORMANCE OF ACTIVITIES OF DAILY LIVING: ICD-10-CM

## 2019-04-12 DIAGNOSIS — R29.898 LEFT ARM WEAKNESS: ICD-10-CM

## 2019-04-12 DIAGNOSIS — M62.81 MUSCLE WEAKNESS OF LOWER EXTREMITY: ICD-10-CM

## 2019-04-12 PROCEDURE — 97110 THERAPEUTIC EXERCISES: CPT | Mod: PN

## 2019-04-12 PROCEDURE — 97112 NEUROMUSCULAR REEDUCATION: CPT | Mod: PN

## 2019-04-12 NOTE — PROGRESS NOTES
"  Physical Therapy Daily Treatment Note     Name: Lore Shrestha  Clinic Number: [id number]    Therapy Diagnosis:   Encounter Diagnoses   Name Primary?    Muscle weakness of lower extremity     Impaired functional mobility, balance, gait, and endurance      Physician: Patricia Boyle,Cornel    Visit Date: 4/12/2019    Physician Orders: PT Eval and Treat   Medical Diagnosis from Referral: L sided numbness  Evaluation Date: 3/25/2019  Authorization Period Expiration: 12/31/19  Plan of Care Expiration: 5/25/19  Visit # / Visits authorized: 4/ 20     Time In: 0900  Time Out: 0954  Total Billable Time: 54 minutes    Precautions: Diabetes and HIV    Subjective     Pt reports: he has occasional pain on top of R foot when pointing. No longer has back pain. He feels it was from sitting too much at the computer.   He was compliant with home exercise program.  Response to previous treatment: good   Functional change: L LE feels more stable    Pain: 0/10  Location: bilateral back      Objective     BOLD= performed today    Fady received therapeutic exercises to develop strength, endurance, ROM, flexibility, posture and core stabilization for 44 minutes including:    +Upright bike x 8 min level 2  Standing Hip abduction 3 x 10 x 3#  Mini Squats 2 x 10   Step ups 6'' step x 20 ea LE   +Shuttle squats 2.5 bands x 30  +Sled push-pull 80' x 3 laps  +Matrix hip abd 45# 3 x 10  Sit <> stands 2 x 10 on 18" plyo box   +Lifting mechanics (  LTR x 2'   HL hip abduction Green TB x 20   Bridges 3 x 10 with ball squeeze   SLR 2 x 10 ea LE   Seated LAQ with ball squeeze 20 x 3'' hold     Fady participated in neuromuscular re-education activities to improve: Balance for 10 minutes. The following activities were included:    Fwd stepping over small berry x 20 ea LE   Lateral stepping over small berry x 20 ea LE   Vertical HT Tandem Stance on Airex  2 x 1'   Horizontal HT NBOS on Airex 2 x 1'   +SLS on blue foam 3 x 30 sec ea  +Rockerboard " x 20 ea way    Home Exercises Provided and Patient Education Provided     Education provided:   - compliance with HEP    Written Home Exercises Provided: Patient instructed to cont prior HEP.  Exercises were reviewed and Fady was able to demonstrate them prior to the end of the session.  Fady demonstrated good  understanding of the education provided.     See EMR under Patient Instructions for exercises provided prior visit.    Assessment     Fady had good tolerance to treatment today with no adverse effects. Post-treatment R foot pain rated as 0/10. Pt was challenged with SLS but improvements with repetition. Able to hold single leg balance for 30 seconds on uneven surface after 3 trials. Good response to progression of exercise program. He demonstrates good stability with Rockerboard. Good demonstration of proper body mechanics with lifting following manual and verbal cueing to avoid trunk flexion and maintaining straight back.     Pt prognosis is Good.     Pt will continue to benefit from skilled outpatient physical therapy to address the deficits listed in the problem list box on initial evaluation, provide pt/family education and to maximize pt's level of independence in the home and community environment.     Pt's spiritual, cultural and educational needs considered and pt agreeable to plan of care and goals.      Anticipated barriers to physical therapy: none    GOALS: Short Term Goals: 4 weeks  1. Pt will increase 30 sec sit to stand score to 10 to indicate improved functional strength.  - ongoing  2. Pt will be able to tolerate multi-directional LE strengthening in order to improve ability to perform household chores.- met 4/12/19  3. Pt will report 50% improvement in ability to walk long distances since start of care to indicate improved functional mobility.   ongoing  4. Pt will hold single leg stance on L for 10 seconds to indicate improved static balance.- met 4/12/19  5. Pt to tolerate HEP to  improve ROM and independence with ADL's   ongoing     Long Term Goals: 8 weeks  1. Pt will increase DGI score to 22/24 to indicate improved static and dynamic balance.   ongoing  2. Pt will be able to perform 2 x 10 multi-directional LE strengthening without fatigue in order to improve ability to perform household chores.  ongoing  3. Pt will report 80% improvement in ability to walk long distances since start of care to indicate improved functional mobility.   ongoing  4. Pt will hold single leg stance on L for 15 seconds to indicate improved static balance.- met 4/12/19  5. Pt to be Independent with HEP to improve ROM and independence with ADL's  ongoing      Plan     Progress B LE strengthening and balance training. Review lifting body mechanics.     Janett Frazier, PT

## 2019-04-15 ENCOUNTER — DOCUMENTATION ONLY (OUTPATIENT)
Dept: REHABILITATION | Facility: HOSPITAL | Age: 54
End: 2019-04-15

## 2019-04-15 NOTE — PROGRESS NOTES
Physical Therapy: No show/Cancellation of Visit  Date: 04/15/2019    Patient cancelled today's PT appointment. Reason for cancellation: unavailable. Patient's next scheduled appointment is 4/17/19.     Cancel: 1  No show: 0    Therapist: Janett Frazier PT

## 2019-04-17 ENCOUNTER — CLINICAL SUPPORT (OUTPATIENT)
Dept: REHABILITATION | Facility: HOSPITAL | Age: 54
End: 2019-04-17
Attending: HOSPITALIST
Payer: COMMERCIAL

## 2019-04-17 DIAGNOSIS — Z74.09 IMPAIRED FUNCTIONAL MOBILITY, BALANCE, GAIT, AND ENDURANCE: ICD-10-CM

## 2019-04-17 DIAGNOSIS — M62.81 MUSCLE WEAKNESS OF LOWER EXTREMITY: ICD-10-CM

## 2019-04-17 PROCEDURE — 97112 NEUROMUSCULAR REEDUCATION: CPT | Mod: PN

## 2019-04-17 PROCEDURE — 97110 THERAPEUTIC EXERCISES: CPT | Mod: PN

## 2019-04-17 NOTE — PROGRESS NOTES
"  Physical Therapy Daily Treatment Note     Name: Lore Shrestha  Clinic Number: [id number]    Therapy Diagnosis:   Encounter Diagnoses   Name Primary?    Muscle weakness of lower extremity     Impaired functional mobility, balance, gait, and endurance      Physician: Patricia Boyle,Cornel    Visit Date: 4/17/2019    Physician Orders: PT Eval and Treat   Medical Diagnosis from Referral: L sided numbness  Evaluation Date: 3/25/2019  Authorization Period Expiration: 12/31/19  Plan of Care Expiration: 5/25/19  Visit # / Visits authorized: 5/ 20     Time In: 0803  Time Out: 0855  Total Billable Time: 52 minutes    Precautions: Diabetes and HIV    Subjective     Pt reports: no new complaints.   He was compliant with home exercise program.  Response to previous treatment: good   Functional change: close to normal function    Pain: 0/10  Location: bilateral back      Objective     BOLD= performed today    Fady received therapeutic exercises to develop strength, endurance, ROM, flexibility, posture and core stabilization for 32 minutes including:    Upright bike x 8 min level 2- NP  +Treadmill walking x 7 min @ 2.0 mph  Standing Hip abduction 3 x 10 x 3#  Mini Squats 2 x 10   Step ups 6'' step x 20 ea LE   Shuttle squats 2.5 bands x 30  Sled push-pull 80' x 3 laps  Matrix hip abd 45# 3 x 10  Sit <> stands 2 x 10 on 18" plyo box   Lifting mechanics w 16# box from waist height x 20   +Anterior tib stretch on R 3 x 30 sec  LTR x 2'   HL hip abduction Green TB x 20   Bridges 3 x 10 with ball squeeze   SLR 2 x 10 ea LE   Seated LAQ with ball squeeze 20 x 3'' hold     Fady participated in neuromuscular re-education activities to improve: Balance for 20 minutes. The following activities were included:    +Walking forward over small and medium berry x 3 laps  +Walking lateral over small and medium berry x 3 laps  +BOSU squats x 20  Vertical HT Tandem Stance on Airex  2 x 1'   Horizontal HT NBOS on Airex 2 x 1'   SLS on blue " foam 3 x 30 sec ea  Rockerboard x 20 ea way    Home Exercises Provided and Patient Education Provided     Education provided:   - compliance with HEP    Written Home Exercises Provided: Patient instructed to cont prior HEP.  Exercises were reviewed and Fady was able to demonstrate them prior to the end of the session.  Fady demonstrated good  understanding of the education provided.     See EMR under Patient Instructions for exercises provided prior visit.    Assessment     Fady had good tolerance to treatment today with no adverse effects. Post-treatment R foot pain rated as 0/10. Pt with good response to treadmill walking warm up. Good carryover of lifting body mechanics noted following verbal cueing initially to bend knees. Good stability with BOSU squats without use of UEs. No LOB with berry walking. Plan for DC on 4/24/19.     Pt prognosis is Good.     Pt will continue to benefit from skilled outpatient physical therapy to address the deficits listed in the problem list box on initial evaluation, provide pt/family education and to maximize pt's level of independence in the home and community environment.     Pt's spiritual, cultural and educational needs considered and pt agreeable to plan of care and goals.      Anticipated barriers to physical therapy: none    GOALS: Short Term Goals: 4 weeks  1. Pt will increase 30 sec sit to stand score to 10 to indicate improved functional strength.  - ongoing  2. Pt will be able to tolerate multi-directional LE strengthening in order to improve ability to perform household chores.- met 4/12/19  3. Pt will report 50% improvement in ability to walk long distances since start of care to indicate improved functional mobility.   ongoing  4. Pt will hold single leg stance on L for 10 seconds to indicate improved static balance.- met 4/12/19  5. Pt to tolerate HEP to improve ROM and independence with ADL's. - met 4/17/19     Long Term Goals: 8 weeks  1. Pt will increase  DGI score to 22/24 to indicate improved static and dynamic balance.   ongoing  2. Pt will be able to perform 2 x 10 multi-directional LE strengthening without fatigue in order to improve ability to perform household chores.- met 4/17/19  3. Pt will report 80% improvement in ability to walk long distances since start of care to indicate improved functional mobility.   ongoing  4. Pt will hold single leg stance on L for 15 seconds to indicate improved static balance.- met 4/12/19  5. Pt to be Independent with HEP to improve ROM and independence with ADL's. - met 4/17/19      Plan     Review lifting body mechanics. Plan for DC next visit.     Janett Frazier, PT

## 2019-04-22 ENCOUNTER — CLINICAL SUPPORT (OUTPATIENT)
Dept: REHABILITATION | Facility: HOSPITAL | Age: 54
End: 2019-04-22
Attending: HOSPITALIST
Payer: COMMERCIAL

## 2019-04-22 DIAGNOSIS — R29.898 LEFT ARM WEAKNESS: ICD-10-CM

## 2019-04-22 DIAGNOSIS — M25.60 RANGE OF MOTION DEFICIT: ICD-10-CM

## 2019-04-22 DIAGNOSIS — Z78.9 ALTERATION IN PERFORMANCE OF ACTIVITIES OF DAILY LIVING: ICD-10-CM

## 2019-04-22 DIAGNOSIS — R27.9 LACK OF COORDINATION DUE TO ACUTE CEREBROVASCULAR ACCIDENT (CVA): ICD-10-CM

## 2019-04-22 DIAGNOSIS — I63.9 LACK OF COORDINATION DUE TO ACUTE CEREBROVASCULAR ACCIDENT (CVA): ICD-10-CM

## 2019-04-22 PROCEDURE — 97110 THERAPEUTIC EXERCISES: CPT | Mod: PN

## 2019-04-22 NOTE — PROGRESS NOTES
Occupational Therapy Daily Treatment Note     Date: 4/22/2019  Name: Lore Shrestha  Clinic Number: [id number]    Therapy Diagnosis:   Encounter Diagnoses   Name Primary?    Range of motion deficit     Left arm weakness     Alteration in performance of activities of daily living     Lack of coordination due to acute cerebrovascular accident (CVA)      Physician: Patricia Boyle,*    Physician Orders: eval and treat  Medical Diagnosis: acute infarction in the right thalamus  Evaluation Date: 3/19/2019  Plan of Care Expiration Period: 3/19/19 - 5/14/19  Insurance Authorization period Expiration: 12/31/19  Date of Return to MD: to new primary physician next week(outside doctor)  Visit # / Visits Authorized: 8 / 20  FOTO: 10th visit     Time In:  10:58 am  Time Out:  12:00 pm  Total Billable (one on one) Time:  62 minutes     Precautions: Standard and Diabetes, HIV+      Subjective     Pt reports:  His arms are a little sore but he has been doing more at home.   he was compliant with home exercise program given last session.   Response to previous treatment: no adverse affects  Functional change: being more active at home.    Pain:  0/10 sore  Location:  Forearms sore    Objective     Fady received therapeutic exercises for 62 minutes including:  -Pt was engaged in sci fit with BUE with verbal cues for sustained RPMs and directional changes every minute  -10# kettle bell moved from waist high shelf to head high shelf.   -15# kettle bell carry left hand x 6  75'   -1 gram ball bounce on horizontal tramp left hand x 30 reps  -1 gram ball alternate hand bounce catch  Horizontal tramp x 30 reps  - rebounder 1gram ball left hand throw and catch x 20 reps several misses  - rebounder overhead toss with 7# ball x 30 reps  - Matrix bicep curls 12.5# x 2 sets 10 reps              tricep curls 35# x 3 sets 10 rpes              chest press 20# x 3 sets 10 reps   pulling green putty and rolling into 13 balls   locating 8  washers placed in green putty with left hand    Home Exercises and Education Provided     Education provided:   - Progress towards goals     Written Home Exercises Provided: Patient instructed to cont prior HEP.  Exercises were reviewed and Fady was able to demonstrate them prior to the end of the session.  Fady demonstrated good  understanding of the HEP provided.   .   See EMR under Patient Instructions for exercises provided 3/19/19.      CMS Impairment/Limitation/Restriction for FOTO neuro Survey     Therapist reviewed FOTO scores for Lore Shrestha on 3/19/2019.   FOTO documents entered into [employer] - see Media section.     Limitation Score: 55%  Category: Self Care     Current : CK = at least 40% but < 60% impaired, limited or restricted 55%  Goal: CJ = at least 20% but < 40% impaired, limited or restricted 35%  Discharge:    Assessment     Pt with good functional lifting and carrying with his left hand in clinic. Fine motor coordination with left hand effective as well.   Fady is progressing well  towards his goals and there are no updates to goals at this time. Pt prognosis is Good.     Pt will continue to benefit from skilled outpatient occupational therapy to address the deficits listed in the problem list on initial evaluation provide pt/family education and to maximize pt's level of independence in the home and community environment.     Anticipated barriers to occupational therapy: none noted    Pt's spiritual, cultural and educational needs considered and pt agreeable to plan of care and goals.    Goals:  Short Term Goals: 4 weeks   Patient will perform his HEP 4 x week- met 4/10/19  Pt will engage left hand in all self care tasks - met 4/10/19.  LB dressing will improve to I inclusive of left hand use - met 4/10/19     LTG GOALS:  Time frame: 8 weeks  Increase AROM left shoulder to allow for reaching to shoulder height for kitchen items  Increase  strength to 60# to assist with carrying  groceries and heavy items  Increase left arm to 4+/5 for household chores- met 4/10/19  Feeding will improve to  I with use of left hand for stabilizing meat   Simple home care will include use of left hand during mopping / sweeping      Plan   Continue with plan of care 2 x week x 8 weeks progressing activities and ex as tolerated  Updates/Grading for next session: n/a      ZEHRA Kim

## 2019-04-24 ENCOUNTER — CLINICAL SUPPORT (OUTPATIENT)
Dept: REHABILITATION | Facility: HOSPITAL | Age: 54
End: 2019-04-24
Attending: HOSPITALIST
Payer: COMMERCIAL

## 2019-04-24 DIAGNOSIS — Z74.09 IMPAIRED FUNCTIONAL MOBILITY, BALANCE, GAIT, AND ENDURANCE: ICD-10-CM

## 2019-04-24 DIAGNOSIS — Z78.9 ALTERATION IN PERFORMANCE OF ACTIVITIES OF DAILY LIVING: ICD-10-CM

## 2019-04-24 DIAGNOSIS — R29.898 LEFT ARM WEAKNESS: ICD-10-CM

## 2019-04-24 DIAGNOSIS — M62.81 MUSCLE WEAKNESS OF LOWER EXTREMITY: ICD-10-CM

## 2019-04-24 DIAGNOSIS — R27.9 LACK OF COORDINATION DUE TO ACUTE CEREBROVASCULAR ACCIDENT (CVA): ICD-10-CM

## 2019-04-24 DIAGNOSIS — I63.9 LACK OF COORDINATION DUE TO ACUTE CEREBROVASCULAR ACCIDENT (CVA): ICD-10-CM

## 2019-04-24 DIAGNOSIS — M25.60 RANGE OF MOTION DEFICIT: ICD-10-CM

## 2019-04-24 PROCEDURE — 97110 THERAPEUTIC EXERCISES: CPT | Mod: PN

## 2019-04-24 PROCEDURE — 97112 NEUROMUSCULAR REEDUCATION: CPT | Mod: PN

## 2019-04-24 NOTE — PROGRESS NOTES
Physical Therapy Daily Treatment Note     Name: Lore Shrestha  [facility] Number: [id number]    Therapy Diagnosis:   Encounter Diagnoses   Name Primary?    Muscle weakness of lower extremity     Impaired functional mobility, balance, gait, and endurance      Physician: Patricia Boyle,Cornel    Visit Date: 4/24/2019    Physician Orders: PT Eval and Treat   Medical Diagnosis from Referral: L sided numbness  Evaluation Date: 3/25/2019  Last PN: 4/24/19 (visit 6)  Authorization Period Expiration: 12/31/19  Plan of Care Expiration: 5/25/19  Visit # / Visits authorized: 6/ 20     Time In: 0707  Time Out: 0751  Total Billable Time: 44 minutes    Precautions: Diabetes and HIV    Subjective     Pt reports: he is feeling good today. Ready for discharge today. 75% improvement in ability to walk long distances since start of care.   He was compliant with home exercise program.  Response to previous treatment: good   Functional change: no deficits with daily routine    Pain: 0/10  Location: bilateral back      Objective     Taken 4/24/19:  30 sec sit to stand: 10  DYNAMIC GAIT INDEX  1. Gait level surfaces: 3       3 Normal Walks 20' w/no A.D., good speed, no evidence for imbalance,normal gait pattern     2. Change in Gait Speed: 3      3 Normal Able to smoothly change walking speed without loss of balance or gait deviation. Shows a significant difference in walking speeds between normal, fast and slow speeds.     3:  Gait with Horizontal Head Turns: 2      2 Mild Impairment Performs head turns smoothly with slight change in gait velocity, i.e minor disruptions to smooth gait path or uses a walking aid.      4. Gait with vertical Head turns:  3      3 Normal  Performs head turns with no change in gait     5.  Gait and pivot turn:  3      3 Normal Pivot turns safely within 3 seconds and stops quickly with no loss of balance.     6.  Step over Obstacle: 3      3 Normal Is able to step over box without changing gait speed, no  "evidence of imbalance     7. Step Around Obstacles:  3      3  Normal  Is able to walk around objects safely without changing gait speed, no evidence of imbalance     8. Steps:  3      3  Normal  Alternating feet, no rail     TOTAL SCORE: 23 / 24    BOLD= performed today    Fady received therapeutic exercises to develop strength, endurance, ROM, flexibility, posture and core stabilization for 34 minutes including:    +Elliptical x 5 min crossramp 10 level 1  Standing Hip abduction 3 x 10 x 3#  Mini Squats 2 x 10   Step ups 6'' step x 20 ea LE   Shuttle squats 3 bands x 30  Sled push-pull 80' x 3 laps  Matrix hip abd 45# 3 x 10  Sit <> stands 2 x 10 on 18" plyo box   Lifting mechanics w 16# box from waist height x 20   Anterior tib stretch on R 3 x 30 sec  LTR x 2'   HL hip abduction Green TB x 20   Bridges 3 x 10 with ball squeeze   SLR 2 x 10 ea LE   Seated LAQ with ball squeeze 20 x 3'' hold     Fady participated in neuromuscular re-education activities to improve: Balance for 10 minutes. The following activities were included:    Walking forward over small and medium berry x 3 laps  Walking lateral over small and medium berry x 3 laps  BOSU squats x 20  Vertical HT Tandem Stance on Airex  2 x 1'   Horizontal HT NBOS on Airex 2 x 1'   SLS on blue foam 3 x 30 sec ea  Rockerboard x 20 ea way    Home Exercises Provided and Patient Education Provided     Education provided:   - compliance with HEP    Written Home Exercises Provided: Patient instructed to cont prior HEP.  Exercises were reviewed and Fady was able to demonstrate them prior to the end of the session.  Fady demonstrated good  understanding of the education provided.     See EMR under Patient Instructions for exercises provided prior visit.    CMS Impairment/Limitation/Restriction for FOTO Cerebrovascular Disorders Survey    Therapist reviewed FOTO scores for Lore Shrestha on 4/24/2019.   FOTO documents entered into [employer] - see Media " section.    Limitation Score: 23%  Category: Mobility    Current : CJ = at least 20% but < 40% impaired, limited or restricted  Goal: CJ = at least 20% but < 40% impaired, limited or restricted  Discharge: CJ = at least 20% but < 40% impaired, limited or restricted       Assessment     Fady was re-assessed today with 5/5 STGs and 4/5 LTGs being met indicating improvements in functional strength, B LE strength, static and dynamic balance, independence with HEP, and ability to walk long distances since start of care. DGI score 23/24 indicating low fall risk and good dynamic balance. He had good tolerance to treatment today with no adverse effects. Post-treatment pain rated as 0/10. Good stability noted with balance training. Able to progress with LE strengthening on machines. Initial cueing for lifting body mechanics required but able to demonstrate with good form independently after. Pt has made great progress with physical therapy services and is appropriate for discharge at this time.      Anticipated barriers to physical therapy: none    GOALS: Short Term Goals: 4 weeks  1. Pt will increase 30 sec sit to stand score to 10 to indicate improved functional strength.  - met 4/24/19  2. Pt will be able to tolerate multi-directional LE strengthening in order to improve ability to perform household chores.- met 4/12/19  3. Pt will report 50% improvement in ability to walk long distances since start of care to indicate improved functional mobility.- met 4/24/19  4. Pt will hold single leg stance on L for 10 seconds to indicate improved static balance.- met 4/12/19  5. Pt to tolerate HEP to improve ROM and independence with ADL's. - met 4/17/19     Long Term Goals: 8 weeks  1. Pt will increase DGI score to 22/24 to indicate improved static and dynamic balance.- met 4/24/19  2. Pt will be able to perform 2 x 10 multi-directional LE strengthening without fatigue in order to improve ability to perform household chores.- met  4/17/19  3. Pt will report 80% improvement in ability to walk long distances since start of care to indicate improved functional mobility.- not met  4. Pt will hold single leg stance on L for 15 seconds to indicate improved static balance.- met 4/12/19  5. Pt to be Independent with HEP to improve ROM and independence with ADL's. - met 4/17/19    Plan     Pt is discharged from physical therapy services.    Janett Frazier, PT

## 2019-04-24 NOTE — PROGRESS NOTES
Occupational Therapy Progress Note/ D/C Summary     Date: 4/24/2019  Name: Lore Shrestha  Clinic Number: [id number]    Therapy Diagnosis:   Encounter Diagnoses   Name Primary?    Range of motion deficit     Left arm weakness     Alteration in performance of activities of daily living     Lack of coordination due to acute cerebrovascular accident (CVA)      Physician: Patricia Boyle,*    Physician Orders: eval and treat  Medical Diagnosis: acute infarction in the right thalamus  Evaluation Date: 3/19/2019  Plan of Care Expiration Period: 3/19/19 - 5/14/19  Insurance Authorization period Expiration: 12/31/19  Date of Return to MD: to new primary physician next week(outside doctor)  Visit # / Visits Authorized: 9/ 20  FOTO: 9     Time In: 8:00 am  Time Out: 9:55  am  Total Billable (one on one) Time: 55 minutes     Precautions: Standard and Diabetes, HIV+    Date of Last visit: 04/24/2019  Total Visits Received: 9  Cancelled Visits: 2  No Show Visits: 0    Subjective     Pt reports:  His left elbow is sore but he did go shopping yesterday but sports cream on it and it helped.  he was compliant with home exercise program given last session.   Response to previous treatment: forearm soreness  Functional change: ability to carry 2 gallons of water with left hand/ar    Pain:  /10 sore  Location: left elbow    Objective       Fady received therapeutic exercises for 55 minutes including:  -Pt was engaged in sci fit with BUE with verbal cues for sustained RPMs throughout      Joint Evaluation  AROM  3/19/2019 AROM    4/10/19 AROM   4/24/19     Left Left Left     Shoulder flex 0-180 90    120     160   Shoulder Abd 0-180 60    115     170   Shoulder ER 0-90 45    60     70   Shoulder IR 0-90 45 to hip    60 upper glut  65  Mid back   Shoulder Extension 0-80 WFL       Shoulder Horizontal adduction 0-90 WFL       Elbow flex/ext 0-150 WFL       Wrist flex 0-80 WFL       Wrist ext 0-70 WFL       Supination 0-80 WFL        Pronation 0-80 WFL          Fist: normal, able to oppose to all tips and 5th MCP heads bilaterally     Right UE AROM all joints WFL        Strength 3/19/2019    4/10/19   4/24/19 3/19/2019   **within available ROM** Left              Left        Left  Right   Shoulder flex 4+/5                              5/5 5/5   Shoulder abd 4+/5                               5/5 5/5   Shoulder ER 4/5                4+/5         5/5 5/5   Shoulder IR 4/5                4+/5         5/5 5/5   Shoulder Extension 4+/5                              5/5 5/5   Shoulder Horizontal adduction 4+/5                              5/5 5/5   Elbow flex 4+/5 5/5   Elbow ext 4+/5 5/5   Wrist flex 4+/5 5/5   Wrist ext 4+/5 5/5   Supination 4/5                4+/5 5/5   Pronation 4/5                4+/5 5/5       Strength: (KAYKAY Dynamometer in lbs.) Average 1 trials, Position II:       3/19/2019 4/10/19 3/19/2019     Left Left  Right   Rung II 47.6# 85.7# 109.4#      Pinch Strength (Measured in psi)       3/19/2019 4/10/19 3/19/2019     Left Left  Right   Key Pinch 17 psi 22 psi 26 psi   3pt Pinch 10 psi 18.6 psi 25 psi      Fine Motor Coordination: 9 Hole Peg Test  Left 3/19/2019 Left 4/10/19 Right 3/19/2019   34 seconds 2 drops   30 seconds 1 drop   22 seconds no drops      Matrix chest press 25# x 2 sets 10 reps             Rows 30# x 3 sets  10 reps             tricep curls 40# x 2 sets 10 reps  Cable cross vertical  rows with 7# x 3 min                        Left arm adduction with 7# x 3 min                       Left arm ER 7# x 2 min  Ultra gripper with left hand setting # 3 x 4 min                            Green clip 3pt pinch x 3 min    CMS Impairment/Limitation/Restriction for FOTO neuro Survey     Therapist reviewed FOTO scores for Lore Shrestha on 4/24/2019.   FOTO documents entered into [employer] - see Media section.     Limitation Score:  Category: Self Care     Goal: CJ = at least 20% but < 40% impaired, limited or  restricted 35%  Discharge: CJ = at least 20% but < 40% impaired, limited or restricted 26%           Assessment     Pt will continue to benefit from skilled outpatient occupational therapy to address the deficits listed in the problem list on initial evaluation provide pt/family education and to maximize pt's level of independence in the home and community environment.     Anticipated barriers to occupational therapy: none noted    Pt's spiritual, cultural and educational needs considered and pt agreeable to plan of care and goals.    Goals:  Short Term Goals: 4 weeks   Patient will perform his HEP 4 x week- met 4/10/19  Pt will engage left hand in all self care tasks - met 4/10/19.  LB dressing will improve to I inclusive of left hand use - met 4/10/19     LTG GOALS:  Time frame: 8 weeks  Increase AROM left shoulder to allow for reaching to shoulder height for kitchen items- met 4/10/19  Increase  strength to 60# to assist with carrying groceries and heavy items- met 4/10/19  Increase left arm to 4+/5 for household chores- met 4/10/19  Feeding will improve to  I with use of left hand for stabilizing meat - met 4/24/19   Simple home care will include use of left hand during mopping / sweeping - met 4/10/19    Discharge reason: Patient has met all of his/her goals    Plan   This patient is discharged from Occupational Therapy      ZEHRA Kim

## 2019-05-21 ENCOUNTER — CLINICAL SUPPORT (OUTPATIENT)
Dept: INFECTIOUS DISEASES | Facility: CLINIC | Age: 54
End: 2019-05-21
Payer: COMMERCIAL

## 2019-05-21 ENCOUNTER — LAB VISIT (OUTPATIENT)
Dept: LAB | Facility: HOSPITAL | Age: 54
End: 2019-05-21
Payer: COMMERCIAL

## 2019-05-21 ENCOUNTER — OFFICE VISIT (OUTPATIENT)
Dept: INFECTIOUS DISEASES | Facility: CLINIC | Age: 54
End: 2019-05-21
Payer: COMMERCIAL

## 2019-05-21 VITALS
HEART RATE: 86 BPM | WEIGHT: 214.5 LBS | DIASTOLIC BLOOD PRESSURE: 79 MMHG | BODY MASS INDEX: 30.03 KG/M2 | TEMPERATURE: 98 F | SYSTOLIC BLOOD PRESSURE: 128 MMHG | HEIGHT: 71 IN

## 2019-05-21 DIAGNOSIS — I10 HYPERTENSION, UNSPECIFIED TYPE: ICD-10-CM

## 2019-05-21 DIAGNOSIS — Z21 HIV POSITIVE: ICD-10-CM

## 2019-05-21 DIAGNOSIS — Z21 HIV POSITIVE: Primary | ICD-10-CM

## 2019-05-21 LAB
ANION GAP SERPL CALC-SCNC: 9 MMOL/L (ref 8–16)
BUN SERPL-MCNC: 13 MG/DL (ref 6–20)
CALCIUM SERPL-MCNC: 10.1 MG/DL (ref 8.7–10.5)
CHLORIDE SERPL-SCNC: 107 MMOL/L (ref 95–110)
CO2 SERPL-SCNC: 28 MMOL/L (ref 23–29)
CREAT SERPL-MCNC: 1.2 MG/DL (ref 0.5–1.4)
EST. GFR  (AFRICAN AMERICAN): >60 ML/MIN/1.73 M^2
EST. GFR  (NON AFRICAN AMERICAN): >60 ML/MIN/1.73 M^2
GLUCOSE SERPL-MCNC: 68 MG/DL (ref 70–110)
HBV CORE IGM SERPL QL IA: NEGATIVE
HBV SURFACE AB SER-ACNC: POSITIVE M[IU]/ML
HBV SURFACE AG SERPL QL IA: NEGATIVE
HCV AB SERPL QL IA: POSITIVE
HEPATITIS A ANTIBODY, IGG: POSITIVE
HIV 1+2 AB+HIV1 P24 AG SERPL QL IA: ABNORMAL
POTASSIUM SERPL-SCNC: 3.2 MMOL/L (ref 3.5–5.1)
RPR SER QL: REACTIVE
RPR SER-TITR: ABNORMAL {TITER}
SODIUM SERPL-SCNC: 144 MMOL/L (ref 136–145)

## 2019-05-21 PROCEDURE — 86705 HEP B CORE ANTIBODY IGM: CPT

## 2019-05-21 PROCEDURE — 3008F PR BODY MASS INDEX (BMI) DOCUMENTED: ICD-10-PCS | Mod: CPTII,S$GLB,, | Performed by: INTERNAL MEDICINE

## 2019-05-21 PROCEDURE — 87906 NFCT AGT GNTYP ALYS HIV1: CPT

## 2019-05-21 PROCEDURE — 86803 HEPATITIS C AB TEST: CPT

## 2019-05-21 PROCEDURE — 86787 VARICELLA-ZOSTER ANTIBODY: CPT

## 2019-05-21 PROCEDURE — 3074F SYST BP LT 130 MM HG: CPT | Mod: CPTII,S$GLB,, | Performed by: INTERNAL MEDICINE

## 2019-05-21 PROCEDURE — 90750 ZOSTER RECOMBINANT VACCINE: ICD-10-PCS | Mod: S$GLB,,, | Performed by: INTERNAL MEDICINE

## 2019-05-21 PROCEDURE — 86780 TREPONEMA PALLIDUM: CPT

## 2019-05-21 PROCEDURE — 99999 PR PBB SHADOW E&M-EST. PATIENT-LVL III: CPT | Mod: PBBFAC,,, | Performed by: INTERNAL MEDICINE

## 2019-05-21 PROCEDURE — 86777 TOXOPLASMA ANTIBODY: CPT

## 2019-05-21 PROCEDURE — 80048 BASIC METABOLIC PNL TOTAL CA: CPT

## 2019-05-21 PROCEDURE — 3078F PR MOST RECENT DIASTOLIC BLOOD PRESSURE < 80 MM HG: ICD-10-PCS | Mod: CPTII,S$GLB,, | Performed by: INTERNAL MEDICINE

## 2019-05-21 PROCEDURE — 3074F PR MOST RECENT SYSTOLIC BLOOD PRESSURE < 130 MM HG: ICD-10-PCS | Mod: CPTII,S$GLB,, | Performed by: INTERNAL MEDICINE

## 2019-05-21 PROCEDURE — 99205 PR OFFICE/OUTPT VISIT, NEW, LEVL V, 60-74 MIN: ICD-10-PCS | Mod: S$GLB,,, | Performed by: INTERNAL MEDICINE

## 2019-05-21 PROCEDURE — 90750 HZV VACC RECOMBINANT IM: CPT | Mod: S$GLB,,, | Performed by: INTERNAL MEDICINE

## 2019-05-21 PROCEDURE — 86361 T CELL ABSOLUTE COUNT: CPT

## 2019-05-21 PROCEDURE — 90471 ZOSTER RECOMBINANT VACCINE: ICD-10-PCS | Mod: S$GLB,,, | Performed by: INTERNAL MEDICINE

## 2019-05-21 PROCEDURE — 90471 IMMUNIZATION ADMIN: CPT | Mod: S$GLB,,, | Performed by: INTERNAL MEDICINE

## 2019-05-21 PROCEDURE — 86703 HIV-1/HIV-2 1 RESULT ANTBDY: CPT

## 2019-05-21 PROCEDURE — 87900 PHENOTYPE INFECT AGENT DRUG: CPT

## 2019-05-21 PROCEDURE — 3078F DIAST BP <80 MM HG: CPT | Mod: CPTII,S$GLB,, | Performed by: INTERNAL MEDICINE

## 2019-05-21 PROCEDURE — 86706 HEP B SURFACE ANTIBODY: CPT

## 2019-05-21 PROCEDURE — 87340 HEPATITIS B SURFACE AG IA: CPT

## 2019-05-21 PROCEDURE — 87536 HIV-1 QUANT&REVRSE TRNSCRPJ: CPT

## 2019-05-21 PROCEDURE — 86592 SYPHILIS TEST NON-TREP QUAL: CPT

## 2019-05-21 PROCEDURE — 99999 PR PBB SHADOW E&M-EST. PATIENT-LVL III: ICD-10-PCS | Mod: PBBFAC,,, | Performed by: INTERNAL MEDICINE

## 2019-05-21 PROCEDURE — 86593 SYPHILIS TEST NON-TREP QUANT: CPT

## 2019-05-21 PROCEDURE — 3008F BODY MASS INDEX DOCD: CPT | Mod: CPTII,S$GLB,, | Performed by: INTERNAL MEDICINE

## 2019-05-21 PROCEDURE — 99205 OFFICE O/P NEW HI 60 MIN: CPT | Mod: S$GLB,,, | Performed by: INTERNAL MEDICINE

## 2019-05-21 PROCEDURE — 86790 VIRUS ANTIBODY NOS: CPT

## 2019-05-21 RX ORDER — HYDROCHLOROTHIAZIDE 25 MG/1
25 TABLET ORAL 2 TIMES DAILY
Qty: 60 TABLET | Refills: 0 | Status: SHIPPED | OUTPATIENT
Start: 2019-05-21

## 2019-05-21 RX ORDER — LISINOPRIL 40 MG/1
40 TABLET ORAL DAILY
Qty: 30 TABLET | Refills: 0 | Status: SHIPPED | OUTPATIENT
Start: 2019-05-21 | End: 2023-08-15

## 2019-05-21 NOTE — PROGRESS NOTES
"Subjective:      Patient ID: Lore Shrestha is a 53 y.o. male.    Chief Complaint:HIV Positive/AIDS      History of Present Illness    53M PMH HIV diagnosed ~1996 after patient presented with shingles who presents to clinic today to re-establish care w/ a new HIV provider. Previously seen by [facility], however patient states he wishes to see a new provider for unspecified reasons. He is feeling well today. Recently admitted in March w/ L sided weakness, and diagnosed w/ a small thalamic stroke. He does not have any residual deficits, however states he is having issues w/ his memory since the stroke. He endorses a history of various ART regimens since the time of diagnosis. He states he has only been off medication once in 2002 for a "cocktail holiday," during which time he developed pneumonia. He states he has never been told he has any resistance. He endorses being nondetectable for the past few years w/ a CD4 in the 1000s, but is unsure of the exact number. He was previously treated for late latent syphilis w/ 3 doses of IM PCN ~2014. He denies history of any other STI. He is not currently sexually active, but was previously MSM. He is currently taking etravirine, darunavir/c+, and dolutegravir daily (confirmed w/ his pharmacy - filled on May 15). Patient currently works in the [employer] at [employer] and is establishing care w/ Dr. Thomas for primary care.     Review of Systems   Constitution: Negative for chills, decreased appetite, fever, malaise/fatigue, night sweats, weight gain and weight loss.   HENT: Negative for congestion, ear pain, hearing loss, hoarse voice, sore throat and tinnitus.    Eyes: Negative for blurred vision, redness and visual disturbance.   Cardiovascular: Negative for chest pain, leg swelling and palpitations.   Respiratory: Negative for cough, hemoptysis, shortness of breath and sputum production.    Hematologic/Lymphatic: Negative for adenopathy. Does not bruise/bleed easily.   Skin: " Negative for dry skin, itching, rash and suspicious lesions.   Musculoskeletal: Negative for back pain, joint pain, myalgias and neck pain.   Gastrointestinal: Negative for abdominal pain, constipation, diarrhea, heartburn, nausea and vomiting.   Genitourinary: Negative for dysuria, flank pain, frequency, hematuria, hesitancy and urgency.   Neurological: Negative for dizziness, headaches, numbness, paresthesias and weakness.   Psychiatric/Behavioral: Positive for depression and memory loss. The patient has insomnia and is nervous/anxious.      Objective:   Physical Exam   Constitutional: He is oriented to person, place, and time. He appears well-developed and well-nourished. No distress.   HENT:   Head: Atraumatic.   Right Ear: External ear normal.   Left Ear: External ear normal.   Nose: Nose normal.   Mouth/Throat: Oropharynx is clear and moist.   Eyes: EOM are normal.   Neck: Normal range of motion. Neck supple.   Cardiovascular: Normal rate, regular rhythm and normal heart sounds.   No murmur heard.  Pulmonary/Chest: Effort normal and breath sounds normal. No respiratory distress. He has no wheezes.   Abdominal: Soft. Bowel sounds are normal. He exhibits no distension. There is no tenderness.   Musculoskeletal: Normal range of motion. He exhibits no edema or deformity.   Neurological: He is alert and oriented to person, place, and time. No cranial nerve deficit.   Skin: Skin is warm and dry. No rash noted. He is not diaphoretic. No erythema.   Psychiatric: He has a normal mood and affect. His behavior is normal.     Assessment:       1. HIV positive    2. Hypertension, unspecified type        Current ART regimen of dolutegravir, etravirine and darunavir/+c is unusual, not sure if this regimen was chosen based on prior resistance patterns at [facility]. Will try to obtain HIV records from [facility], and order viral bloodwork. Ideally, I would like to decrease this patient's pill burden, but will await  bloodwork.     Plan:       1. HIV positive  - HIV 1/2 Ag/Ab (4th Gen); Future  - HIV RNA, quantitative, PCR; Future  - CD4 T-[place] Cells; Future  - RPR; Future  - FTA antibodies, IgG and IgM; Future  - Hepatitis B surface antibody; Future  - Hepatitis A antibody, IgG; Future  - Hepatitis B core antibody, IgM; Future  - Hepatitis B surface antigen; Future  - Hepatitis C antibody; Future  - Toxoplasma Gondii IgG; Future  - Varicella zoster antibody, IgG; Future  - Quantiferon Gold TB; Future  - Urinalysis  - C. trachomatis/N. gonorrhoeae by AMP DNA [facility]; Urine  - GENOSURE ARCHIVE DNA SEQUENCING; Future  - Zoster Recombinant Vaccine; Standing  - Request records from [facility]    2. Hypertension, unspecified type  - lisinopril (PRINIVIL,ZESTRIL) 40 MG tablet; Take 1 tablet (40 mg total) by mouth once daily.  Dispense: 30 tablet; Refill: 0  - hydroCHLOROthiazide (HYDRODIURIL) 25 MG tablet; Take 1 tablet (25 mg total) by mouth 2 (two) times daily.  Dispense: 60 tablet; Refill: 0  - Basic metabolic panel; Future  - HCTZ BID dosing is unusual (typically only indicated for nephrolithiasis) but patient states this is what he has been taking - will check BMP today to monitor electrolytes, if abnormal will change to once daily, and defer further BP management to PCP    Vaccines:  - Shingrix today, next dose on or after 7/21/2019  - Hep A - serology pending  - Hep B - serology pending  - Menactra - pending outside records  - Prevnar - pending outside records  - Pneumovax - pending outside records  - TDAP - pending outside records  - Flu - annually (pt received 2018 vaccine)    Discussed w/ Dr. Jesus.    RTC in 1 month.     Pilar Munoz DO  Infectious Disease Fellow  P: [phone]

## 2019-05-22 LAB
CD3+CD4+ CELLS # BLD: 1173 CELLS/UL (ref 300–1400)
CD3+CD4+ CELLS NFR BLD: 31 % (ref 28–57)
HIV UQ DATE RECEIVED: NORMAL
HIV UQ DATE REPORTED: NORMAL
HIV1 RNA # SERPL NAA+PROBE: <40 COPIES/ML
HIV1 RNA SERPL NAA+PROBE-LOG#: <1.6 LOG (10) COPIES/ML
HIV1 RNA SERPL QL NAA+PROBE: NOT DETECTED
T GONDII IGG SER QL IA: NORMAL
T GONDII IGG SERPL IA-ACNC: <5 IU/ML (ref 0–6.4)
T PALLIDUM AB SER QL IF: REACTIVE

## 2019-05-23 LAB
VARICELLA INTERPRETATION: POSITIVE
VARICELLA ZOSTER IGG: 3.92 ISR (ref 0–0.9)

## 2019-05-30 ENCOUNTER — TELEPHONE (OUTPATIENT)
Dept: INFECTIOUS DISEASES | Facility: HOSPITAL | Age: 54
End: 2019-05-30

## 2019-05-30 NOTE — TELEPHONE ENCOUNTER
Records from [facility] care obtained. No genotype available. Appears patient was changed from darunavir + ritonavir + dolutegravir + etravirine around Jan 2019, to his current regimen, which is darunavir/c+, dolutegravir and etravirine. Unclear as to why he is on this regimen.    Prior RPRs were obtained, patient's RPR has downtrending and now remains stable at 1:4. It appears he was treated w/ PCN somewhat recently, but exact dates of this are unclear. Will plan to monitor RPR.    Outside records to be scanned to media.     Pilar Munoz DO  Infectious Disease Fellow  P: [phone]

## 2019-06-10 LAB — GENOSURE ARCHIVE DNA SEQUENCING: NORMAL

## 2019-06-13 ENCOUNTER — LAB VISIT (OUTPATIENT)
Dept: LAB | Facility: HOSPITAL | Age: 54
End: 2019-06-13
Payer: COMMERCIAL

## 2019-06-13 ENCOUNTER — OFFICE VISIT (OUTPATIENT)
Dept: INFECTIOUS DISEASES | Facility: CLINIC | Age: 54
End: 2019-06-13
Payer: COMMERCIAL

## 2019-06-13 VITALS
BODY MASS INDEX: 29.14 KG/M2 | TEMPERATURE: 98 F | HEIGHT: 71 IN | HEART RATE: 89 BPM | DIASTOLIC BLOOD PRESSURE: 69 MMHG | SYSTOLIC BLOOD PRESSURE: 108 MMHG | WEIGHT: 208.13 LBS

## 2019-06-13 DIAGNOSIS — Z21 HIV POSITIVE: Primary | ICD-10-CM

## 2019-06-13 DIAGNOSIS — Z21 HIV POSITIVE: ICD-10-CM

## 2019-06-13 PROCEDURE — 87522 HEPATITIS C REVRS TRNSCRPJ: CPT

## 2019-06-13 PROCEDURE — 3008F BODY MASS INDEX DOCD: CPT | Mod: CPTII,S$GLB,, | Performed by: INTERNAL MEDICINE

## 2019-06-13 PROCEDURE — 99213 PR OFFICE/OUTPT VISIT, EST, LEVL III, 20-29 MIN: ICD-10-PCS | Mod: S$GLB,,, | Performed by: INTERNAL MEDICINE

## 2019-06-13 PROCEDURE — 99999 PR PBB SHADOW E&M-EST. PATIENT-LVL III: CPT | Mod: PBBFAC,,, | Performed by: INTERNAL MEDICINE

## 2019-06-13 PROCEDURE — 3078F DIAST BP <80 MM HG: CPT | Mod: CPTII,S$GLB,, | Performed by: INTERNAL MEDICINE

## 2019-06-13 PROCEDURE — 36415 COLL VENOUS BLD VENIPUNCTURE: CPT

## 2019-06-13 PROCEDURE — 3008F PR BODY MASS INDEX (BMI) DOCUMENTED: ICD-10-PCS | Mod: CPTII,S$GLB,, | Performed by: INTERNAL MEDICINE

## 2019-06-13 PROCEDURE — 3074F PR MOST RECENT SYSTOLIC BLOOD PRESSURE < 130 MM HG: ICD-10-PCS | Mod: CPTII,S$GLB,, | Performed by: INTERNAL MEDICINE

## 2019-06-13 PROCEDURE — 99213 OFFICE O/P EST LOW 20 MIN: CPT | Mod: S$GLB,,, | Performed by: INTERNAL MEDICINE

## 2019-06-13 PROCEDURE — 3074F SYST BP LT 130 MM HG: CPT | Mod: CPTII,S$GLB,, | Performed by: INTERNAL MEDICINE

## 2019-06-13 PROCEDURE — 99999 PR PBB SHADOW E&M-EST. PATIENT-LVL III: ICD-10-PCS | Mod: PBBFAC,,, | Performed by: INTERNAL MEDICINE

## 2019-06-13 PROCEDURE — 3078F PR MOST RECENT DIASTOLIC BLOOD PRESSURE < 80 MM HG: ICD-10-PCS | Mod: CPTII,S$GLB,, | Performed by: INTERNAL MEDICINE

## 2019-06-13 NOTE — PROGRESS NOTES
I have reviewed the notes, assessments, and/or procedures performed by Dr. Munoz, I concur with her/his documentation of Lore Shrestha.    no back pain, no gout, no musculoskeletal pain, no neck pain, and no weakness.

## 2019-06-13 NOTE — PROGRESS NOTES
Subjective:      Patient ID: Lore Shrestha is a 53 y.o. male.    Chief Complaint:Follow-up      History of Present Illness    53M PMH HIV, syphilis s/p treatment, who presents for follow up visit for HIV. Since his last visit, we were able to obtain some of his records of his HIV care at [facility]. His most recent medication regimen w/ then was prezcobix, etravirine, and dolutegravir and he has been virally suppressed for some time now w/ a CD4 around 1100. Genosure from his last visit was done, no previous genotyping was available. 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 He has no integrase resistance. RPR of 1:4 is consistent w/ prior infection that has been treated. VL remains undetectable, and CD4 is 1173. Patient denies any new issues. He expresses frustration w/ paperwork that would allow him to return to work.     Review of Systems   Constitution: Negative for chills, decreased appetite, fever, malaise/fatigue, night sweats, weight gain and weight loss.   HENT: Negative for congestion, ear pain, hearing loss, hoarse voice, sore throat and tinnitus.    Eyes: Negative for blurred vision, redness and visual disturbance.   Cardiovascular: Negative for chest pain, leg swelling and palpitations.   Respiratory: Negative for cough, hemoptysis, shortness of breath and sputum production.    Hematologic/Lymphatic: Negative for adenopathy. Does not bruise/bleed easily.   Skin: Negative for dry skin, itching, rash and suspicious lesions.   Musculoskeletal: Negative for back pain, joint pain, myalgias and neck pain.   Gastrointestinal: Negative for abdominal pain, constipation, diarrhea, heartburn, nausea and vomiting.   Genitourinary: Negative for dysuria, flank pain, frequency, hematuria, hesitancy and urgency.   Neurological: Negative for dizziness, numbness, paresthesias and weakness.   Psychiatric/Behavioral: Positive for depression and memory  loss. The patient has insomnia and is nervous/anxious.      Objective:   Physical Exam   Constitutional: He is oriented to person, place, and time. He appears well-developed and well-nourished. No distress.   HENT:   Head: Atraumatic.   Right Ear: External ear normal.   Left Ear: External ear normal.   Nose: Nose normal.   Mouth/Throat: Oropharynx is clear and moist.   Eyes: EOM are normal.   Neck: Normal range of motion. Neck supple.   Cardiovascular: Normal rate, regular rhythm and normal heart sounds.   No murmur heard.  Pulmonary/Chest: Effort normal and breath sounds normal. No respiratory distress. He has no wheezes.   Abdominal: Soft. Bowel sounds are normal. He exhibits no distension. There is no tenderness.   Musculoskeletal: Normal range of motion. He exhibits no edema or deformity.   Neurological: He is alert and oriented to person, place, and time. No cranial nerve deficit.   Skin: Skin is warm and dry. No rash noted. He is not diaphoretic. No erythema.   Psychiatric: He has a normal mood and affect. His behavior is normal.     Assessment:       1. HIV positive        Mr. 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 He has been virally suppressed for some time on this regimen. I discussed w/ him the option of decreasing his pill burden to once daily Juluca (rilpivirine + dolutegravir). He is interested in doing so. He was educated on taking Juluca with a meal every day, and avoiding use of PPI or antacids. We will repeat a viral load in 1 month to ensure he remains undetectable, and every 3 months thereafter.    Plan:       1. HIV positive  - Hep C Ab +, pt unaware of prior diagnosis - will check RNA, if +, will refer to hepatology for evaluation and treatment  - Hepatitis C RNA, quantitative, PCR; Future  - C.  trachomatis/N. gonorrhoeae by AMP DNA [facility]; Urine  - HIV RNA, quantitative, PCR; Future - check in 1 month    Patient is up to date on vaccines.     Will see patient back in 1 month for follow up. Discussed w/ Dr. Wiley.      Pilar Munoz DO  Infectious Disease Fellow  P: [phone]

## 2019-06-14 LAB
HCV RNA SERPL NAA+PROBE-LOG IU: <1.08 LOG (10) IU/ML
HCV RNA SERPL QL NAA+PROBE: NOT DETECTED IU/ML
HCV RNA SPEC NAA+PROBE-ACNC: <12 IU/ML

## 2019-06-27 DIAGNOSIS — I10 HYPERTENSION, UNSPECIFIED TYPE: ICD-10-CM

## 2019-06-27 RX ORDER — LISINOPRIL 40 MG/1
40 TABLET ORAL DAILY
Qty: 30 TABLET | Refills: 0 | OUTPATIENT
Start: 2019-06-27

## 2019-06-27 RX ORDER — HYDROCHLOROTHIAZIDE 25 MG/1
25 TABLET ORAL 2 TIMES DAILY
Qty: 60 TABLET | Refills: 0 | OUTPATIENT
Start: 2019-06-27

## 2021-04-16 ENCOUNTER — PATIENT MESSAGE (OUTPATIENT)
Dept: RESEARCH | Facility: HOSPITAL | Age: 56
End: 2021-04-16

## 2023-08-08 ENCOUNTER — HOSPITAL ENCOUNTER (EMERGENCY)
Facility: HOSPITAL | Age: 58
Discharge: HOME | End: 2023-08-08
Attending: EMERGENCY MEDICINE
Payer: COMMERCIAL

## 2023-08-08 VITALS
HEART RATE: 68 BPM | DIASTOLIC BLOOD PRESSURE: 88 MMHG | HEIGHT: 71 IN | TEMPERATURE: 98 F | SYSTOLIC BLOOD PRESSURE: 142 MMHG | RESPIRATION RATE: 19 BRPM | BODY MASS INDEX: 29.12 KG/M2 | OXYGEN SATURATION: 100 % | WEIGHT: 208 LBS

## 2023-08-08 DIAGNOSIS — R07.9 ACUTE CHEST PAIN: ICD-10-CM

## 2023-08-08 DIAGNOSIS — R41.82 ALTERED MENTAL STATUS, UNSPECIFIED ALTERED MENTAL STATUS TYPE: ICD-10-CM

## 2023-08-08 DIAGNOSIS — E16.2 HYPOGLYCEMIA: Primary | ICD-10-CM

## 2023-08-08 LAB
ALBUMIN SERPL BCP-MCNC: 4.1 G/DL (ref 3.5–5.2)
ALP SERPL-CCNC: 91 U/L (ref 55–135)
ALT SERPL W/O P-5'-P-CCNC: 20 U/L (ref 10–44)
ANION GAP SERPL CALC-SCNC: 8 MMOL/L (ref 8–16)
AST SERPL-CCNC: 21 U/L (ref 10–40)
BASOPHILS # BLD AUTO: 0.04 K/UL (ref 0–0.2)
BASOPHILS NFR BLD: 0.4 % (ref 0–1.9)
BILIRUB SERPL-MCNC: 0.2 MG/DL (ref 0.1–1)
BUN SERPL-MCNC: 10 MG/DL (ref 6–20)
CALCIUM SERPL-MCNC: 9.3 MG/DL (ref 8.7–10.5)
CHLORIDE SERPL-SCNC: 106 MMOL/L (ref 95–110)
CO2 SERPL-SCNC: 26 MMOL/L (ref 23–29)
CREAT SERPL-MCNC: 1.2 MG/DL (ref 0.5–1.4)
DIFFERENTIAL METHOD: ABNORMAL
EOSINOPHIL # BLD AUTO: 0 K/UL (ref 0–0.5)
EOSINOPHIL NFR BLD: 0.4 % (ref 0–8)
ERYTHROCYTE [DISTWIDTH] IN BLOOD BY AUTOMATED COUNT: 12.6 % (ref 11.5–14.5)
EST. GFR  (NO RACE VARIABLE): >60 ML/MIN/1.73 M^2
GLUCOSE SERPL-MCNC: 168 MG/DL (ref 70–110)
HCT VFR BLD AUTO: 37.8 % (ref 40–54)
HGB BLD-MCNC: 12.2 G/DL (ref 14–18)
IMM GRANULOCYTES # BLD AUTO: 0.02 K/UL (ref 0–0.04)
IMM GRANULOCYTES NFR BLD AUTO: 0.2 % (ref 0–0.5)
LYMPHOCYTES # BLD AUTO: 1.6 K/UL (ref 1–4.8)
LYMPHOCYTES NFR BLD: 15.3 % (ref 18–48)
MCH RBC QN AUTO: 27.9 PG (ref 27–31)
MCHC RBC AUTO-ENTMCNC: 32.3 G/DL (ref 32–36)
MCV RBC AUTO: 86 FL (ref 82–98)
MONOCYTES # BLD AUTO: 0.5 K/UL (ref 0.3–1)
MONOCYTES NFR BLD: 4.9 % (ref 4–15)
NEUTROPHILS # BLD AUTO: 8.3 K/UL (ref 1.8–7.7)
NEUTROPHILS NFR BLD: 78.8 % (ref 38–73)
NRBC BLD-RTO: 0 /100 WBC
PLATELET # BLD AUTO: 254 K/UL (ref 150–450)
PMV BLD AUTO: 9.6 FL (ref 9.2–12.9)
POCT GLUCOSE: 114 MG/DL (ref 70–110)
POCT GLUCOSE: 56 MG/DL (ref 70–110)
POCT GLUCOSE: 59 MG/DL (ref 70–110)
POCT GLUCOSE: 84 MG/DL (ref 70–110)
POTASSIUM SERPL-SCNC: 3.3 MMOL/L (ref 3.5–5.1)
PROT SERPL-MCNC: 7.2 G/DL (ref 6–8.4)
RBC # BLD AUTO: 4.38 M/UL (ref 4.6–6.2)
SODIUM SERPL-SCNC: 140 MMOL/L (ref 136–145)
TROPONIN I SERPL DL<=0.01 NG/ML-MCNC: 0.01 NG/ML (ref 0–0.03)
TROPONIN I SERPL DL<=0.01 NG/ML-MCNC: 0.01 NG/ML (ref 0–0.03)
WBC # BLD AUTO: 10.48 K/UL (ref 3.9–12.7)

## 2023-08-08 PROCEDURE — 84484 ASSAY OF TROPONIN QUANT: CPT | Mod: 91 | Performed by: EMERGENCY MEDICINE

## 2023-08-08 PROCEDURE — 96374 THER/PROPH/DIAG INJ IV PUSH: CPT

## 2023-08-08 PROCEDURE — 93010 ELECTROCARDIOGRAM REPORT: CPT | Mod: ,,, | Performed by: INTERNAL MEDICINE

## 2023-08-08 PROCEDURE — 85025 COMPLETE CBC W/AUTO DIFF WBC: CPT | Performed by: EMERGENCY MEDICINE

## 2023-08-08 PROCEDURE — 25000003 PHARM REV CODE 250: Performed by: EMERGENCY MEDICINE

## 2023-08-08 PROCEDURE — 82962 GLUCOSE BLOOD TEST: CPT

## 2023-08-08 PROCEDURE — 93005 ELECTROCARDIOGRAM TRACING: CPT

## 2023-08-08 PROCEDURE — 80053 COMPREHEN METABOLIC PANEL: CPT | Performed by: EMERGENCY MEDICINE

## 2023-08-08 PROCEDURE — 99285 EMERGENCY DEPT VISIT HI MDM: CPT | Mod: 25

## 2023-08-08 PROCEDURE — 93010 EKG 12-LEAD: ICD-10-PCS | Mod: ,,, | Performed by: INTERNAL MEDICINE

## 2023-08-08 RX ORDER — CLONIDINE HYDROCHLORIDE 0.1 MG/1
0.1 TABLET ORAL
Status: COMPLETED | OUTPATIENT
Start: 2023-08-08 | End: 2023-08-08

## 2023-08-08 RX ORDER — HYDROCHLOROTHIAZIDE 25 MG/1
25 TABLET ORAL
Status: COMPLETED | OUTPATIENT
Start: 2023-08-08 | End: 2023-08-08

## 2023-08-08 RX ORDER — DEXTROSE 50 % IN WATER (D50W) INTRAVENOUS SYRINGE
25
Status: COMPLETED | OUTPATIENT
Start: 2023-08-08 | End: 2023-08-08

## 2023-08-08 RX ORDER — LISINOPRIL 20 MG/1
40 TABLET ORAL
Status: COMPLETED | OUTPATIENT
Start: 2023-08-08 | End: 2023-08-08

## 2023-08-08 RX ORDER — HYDRALAZINE HYDROCHLORIDE 20 MG/ML
10 INJECTION INTRAMUSCULAR; INTRAVENOUS
Status: DISCONTINUED | OUTPATIENT
Start: 2023-08-08 | End: 2023-08-08 | Stop reason: HOSPADM

## 2023-08-08 RX ADMIN — CLONIDINE HYDROCHLORIDE 0.1 MG: 0.1 TABLET ORAL at 01:08

## 2023-08-08 RX ADMIN — HYDROCHLOROTHIAZIDE 25 MG: 25 TABLET ORAL at 01:08

## 2023-08-08 RX ADMIN — DEXTROSE MONOHYDRATE 25 G: 25 INJECTION, SOLUTION INTRAVENOUS at 08:08

## 2023-08-08 RX ADMIN — LISINOPRIL 40 MG: 20 TABLET ORAL at 01:08

## 2023-08-08 NOTE — ED PROVIDER NOTES
Encounter Date: 8/8/2023    SCRIBE #1 NOTE: I, David Lee, am scribing for, and in the presence of,  Jake Tavarez MD. Other sections scribed: HPI, ROS.       History     Chief Complaint   Patient presents with    Hypoglycemia     Pt found down and unresponsive at work. EMS reports CBG of 30 at scene. D5W via IV and oral glucose given in route. Pt awake and oriented upon ED arrival.      CC: Unresponsive    HPI: History is provided by independent historian. This is a 58 y.o. M who has DM, HIV, and HTN who presents to the ED via EMS transportation for emergent evaluation of acute unresponsiveness that occurred while at work today. The pt was found down and unresponsiveness at work. Pt's CBG was 30 upon EMS arrival to the pt's place of employment. Pt was administered D5W via IV and oral glucose en route to the ED. The pt states that he was working the night shift and he does not recall anything. Pt states that he takes insulin once before going to work at night, and once in the morning after getting off from work. He last took his insulin before going to work last night. He did not take his insulin when getting off from work this morning. Additionally, the pt reports an episode of CP upon arrival to the ED today. He described the CP as feeling similar to indigestion. The CP lasted 5-10 minutes. Pt denies fever, chills, ear pain, sore throat, eye problem, cough, SOB, CP, abdominal pain, nausea, vomiting, diarrhea, dysuria, arm or leg problems, rash, headache, or Hx of cardiac disease.    The history is provided by the patient. No  was used.     Review of patient's allergies indicates:  No Known Allergies  Past Medical History:   Diagnosis Date    Diabetes mellitus     HIV (human immunodeficiency virus infection)     Hypertension      Past Surgical History:   Procedure Laterality Date    NO PAST SURGERIES  03/11/2019     No family history on file.  Social History     Tobacco Use    Smoking  status: Never    Smokeless tobacco: Never   Substance Use Topics    Alcohol use: No    Drug use: No     Review of Systems   Constitutional:  Negative for chills and fever.   HENT:  Negative for ear pain and sore throat.    Eyes:  Negative for pain and visual disturbance.   Respiratory:  Negative for cough and shortness of breath.    Cardiovascular:  Positive for chest pain (resolved).   Gastrointestinal:  Negative for abdominal pain, diarrhea, nausea and vomiting.   Genitourinary:  Negative for dysuria.   Musculoskeletal:  Negative for back pain and myalgias.   Skin:  Negative for rash.   Neurological:  Positive for syncope. Negative for weakness and headaches.   Hematological:  Does not bruise/bleed easily.       Physical Exam     Initial Vitals [08/08/23 0825]   BP Pulse Resp Temp SpO2   (!) 193/93 77 19 97.9 °F (36.6 °C) 100 %      MAP       --         Physical Exam  The patient was examined specifically for the following:   General:No significant distress, Good color, Warm and dry. Head and neck:Scalp atraumatic, Neck supple. Neurological:Appropriate conversation, Gross motor deficits. Eyes:Conjugate gaze, Clear corneas. ENT: No epistaxis. Cardiac: Regular rate and rhythm, Grossly normal heart tones. Pulmonary: Wheezing, Rales. Gastrointestinal: Abdominal tenderness, Abdominal distention. Musculoskeletal: Extremity deformity, Apparent pain with range of motion of the joints. Skin: Rash.   The findings on examination were normal.  Lungs are clear.  The abdomen is soft.  Extremities are nontender there is no apparent pain with range of motion any joints.  The patient is alert and bright.  Mental status examination, cranial nerves, motor and sensory examination are normal.  ED Course   Procedures  Labs Reviewed   COMPREHENSIVE METABOLIC PANEL - Abnormal; Notable for the following components:       Result Value    Potassium 3.3 (*)     Glucose 168 (*)     All other components within normal limits   CBC W/ AUTO  DIFFERENTIAL - Abnormal; Notable for the following components:    RBC 4.38 (*)     Hemoglobin 12.2 (*)     Hematocrit 37.8 (*)     Gran # (ANC) 8.3 (*)     Gran % 78.8 (*)     Lymph % 15.3 (*)     All other components within normal limits   POCT GLUCOSE - Abnormal; Notable for the following components:    POCT Glucose 56 (*)     All other components within normal limits   POCT GLUCOSE - Abnormal; Notable for the following components:    POCT Glucose 59 (*)     All other components within normal limits   POCT GLUCOSE - Abnormal; Notable for the following components:    POCT Glucose 114 (*)     All other components within normal limits   TROPONIN I   TROPONIN I    Narrative:     Collection has been rescheduled by [facility] at 08/08/2023 13:32 Reason:   LEANDRO Patino to send green top   POCT GLUCOSE   POCT GLUCOSE MONITORING CONTINUOUS     EKG Readings: (Independently Interpreted)   This patient is in a normal sinus rhythm heart rate of 84.  There are no significant ST segment or T-wave changes.  There is no definite evidence of acute myocardial infarction or malignant arrhythmia.  There are some nonspecific ST segment changes.  There are nonspecific T-wave changes.  Dictating an I independently interpreted this EKG.       Imaging Results              X-Ray Chest AP Portable (Final result)  Result time 08/08/23 09:22:43      Final result by Jorge Medina MD (08/08/23 09:22:43)                   Impression:      No convincing evidence of acute cardiopulmonary disease.      Electronically signed by: Jorge Medina  Date:    08/08/2023  Time:    09:22               Narrative:    EXAMINATION:  XR CHEST AP PORTABLE    CLINICAL HISTORY:  chest pain;    TECHNIQUE:  Single frontal view of the chest was performed.    COMPARISON:  Chest radiograph performed 03/11/2019.    FINDINGS:  Monitoring leads overlie the chest.  Grossly unchanged cardiomediastinal contours.    Chronic interstitial coarsening, relatively similar appearance to  03/11/2019 radiograph.    No definite acute appearing focal airspace consolidation.  No evidence of pneumothorax or large volume pleural effusion.    No acute findings in the visualized abdomen.    Osseous and soft tissue structures appear without definite acute change.                                       Medications   hydrALAZINE injection 10 mg (10 mg Intravenous Not Given 8/8/23 1330)   dextrose 50 % in water (D50W) injection 25 g (25 g Intravenous Given 8/8/23 0848)   lisinopriL tablet 40 mg (40 mg Oral Given 8/8/23 1340)   hydroCHLOROthiazide tablet 25 mg (25 mg Oral Given 8/8/23 1340)   cloNIDine tablet 0.1 mg (0.1 mg Oral Given 8/8/23 1340)     Medical Decision Making:   Independently Interpreted Test(s):   I have ordered and independently interpreted EKG Reading(s) - see summary below       <> Summary of EKG Reading(s): EKG independently interpreted by me: see summary below.    Clinical Tests:   Lab Tests: Ordered and Reviewed  Radiological Study: Reviewed and Ordered  Medical Tests: Ordered and Reviewed  ED Management:  Shared decision making with the patient.     This patient was brought to the emergency room because of an altered mental status.  He was discovered to have a glucose of 30 in the field.  He was treated with D50.  He was treated with D50 again in the emergency room.  He had breakfast and lunch trays.  His repeat glucose is about 82.  I will reduce his insulin dosage by 3rd.  I believe he is taking about 90 units at night and 90 units in the morning.  I will refer him back to his primary care doctor.  The patient had a 10 minute episode of dull heavy chest pain in the ambulance.  The patient had no pain during my evaluation.  His EKG fails to reveal evidence of acute ischemia.  Two troponins are negative I doubt myocardial infarction.  I will refer to Cardiology.  I will advise regular meals and lots of liquids.  I will have the patient follow a diabetic diet.  There is no leukocytosis on  CBC.  There is a mild anemia with a hemoglobin of 12 these are not clinically significant.  Chemistries failed to reveal significant abnormalities the patient has a borderline low potassium at 3.3.  His glucose in the emergency room was between 168 and 84.          Scribe Attestation:   Scribe #1: I performed the above scribed service and the documentation accurately describes the services I performed. I attest to the accuracy of the note.                   I personally performed the services described in this documentation.  All medical record  entries made by the scribe are at my direction and in my presence.  Signed, Dr. Tavarez  Clinical Impression:   Final diagnoses:  [R07.9] Acute chest pain  [E16.2] Hypoglycemia (Primary)  [R41.82] Altered mental status, unspecified altered mental status type        ED Disposition Condition    Discharge Stable          ED Prescriptions    None       Follow-up Information       Follow up With Specialties Details Why Contact Info    Inocente Thomas MD Internal Medicine In 3 days  [address]  [facility] [address]  [phone]      Kiesha Zazueta MD Cardiology, Interventional Cardiology In 3 days  [address]  [address]  [facility] [address]  [phone]               Jake Tavarez MD  08/08/23 0268

## 2023-08-08 NOTE — ED TRIAGE NOTES
Pt found down and unresponsive at work. EMS reports CBG of 30 at scene. D5W via IV and oral glucose given in route. Pt awake and oriented upon ED arrival.

## 2023-08-15 ENCOUNTER — HOSPITAL ENCOUNTER (OUTPATIENT)
Facility: HOSPITAL | Age: 58
Discharge: HOME | End: 2023-08-16
Attending: EMERGENCY MEDICINE | Admitting: HOSPITALIST
Payer: COMMERCIAL

## 2023-08-15 DIAGNOSIS — M79.609 PARESTHESIA AND PAIN OF RIGHT EXTREMITY: Primary | ICD-10-CM

## 2023-08-15 DIAGNOSIS — R20.2 PARESTHESIA AND PAIN OF RIGHT EXTREMITY: Primary | ICD-10-CM

## 2023-08-15 DIAGNOSIS — I63.81 LACUNAR INFARCTION: ICD-10-CM

## 2023-08-15 DIAGNOSIS — I82.90 THROMBOSIS: ICD-10-CM

## 2023-08-15 DIAGNOSIS — R20.2 PARESTHESIAS: ICD-10-CM

## 2023-08-15 DIAGNOSIS — I63.9 ISCHEMIC STROKE: ICD-10-CM

## 2023-08-15 PROBLEM — E11.9 DM (DIABETES MELLITUS), TYPE 2: Status: ACTIVE | Noted: 2023-08-15

## 2023-08-15 LAB
ALBUMIN SERPL BCP-MCNC: 4.2 G/DL (ref 3.5–5.2)
ALP SERPL-CCNC: 97 U/L (ref 55–135)
ALT SERPL W/O P-5'-P-CCNC: 19 U/L (ref 10–44)
ANION GAP SERPL CALC-SCNC: 8 MMOL/L (ref 8–16)
AORTIC ROOT ANNULUS: 2.35 CM
AORTIC VALVE CUSP SEPERATION: 1.87 CM
APTT PPP: 25.4 SEC (ref 21–32)
ASCENDING AORTA: 2.77 CM
AST SERPL-CCNC: 22 U/L (ref 10–40)
AV INDEX (PROSTH): 0.61
AV MEAN GRADIENT: 2 MMHG
AV PEAK GRADIENT: 4 MMHG
AV VALVE AREA BY VELOCITY RATIO: 1.87 CM²
AV VALVE AREA: 1.71 CM²
AV VELOCITY RATIO: 0.67
BASOPHILS # BLD AUTO: 0.05 K/UL (ref 0–0.2)
BASOPHILS NFR BLD: 0.6 % (ref 0–1.9)
BILIRUB SERPL-MCNC: 0.3 MG/DL (ref 0.1–1)
BSA FOR ECHO PROCEDURE: 2.07 M2
BUN SERPL-MCNC: 12 MG/DL (ref 6–20)
CALCIUM SERPL-MCNC: 9.5 MG/DL (ref 8.7–10.5)
CHLORIDE SERPL-SCNC: 105 MMOL/L (ref 95–110)
CO2 SERPL-SCNC: 25 MMOL/L (ref 23–29)
CREAT SERPL-MCNC: 1.3 MG/DL (ref 0.5–1.4)
CV ECHO LV RWT: 0.47 CM
DIFFERENTIAL METHOD: ABNORMAL
DOP CALC AO PEAK VEL: 0.98 M/S
DOP CALC AO VTI: 24.4 CM
DOP CALC LVOT AREA: 2.8 CM2
DOP CALC LVOT DIAMETER: 1.88 CM
DOP CALC LVOT PEAK VEL: 0.66 M/S
DOP CALC LVOT STROKE VOLUME: 41.62 CM3
DOP CALC MV VTI: 28.2 CM
DOP CALCLVOT PEAK VEL VTI: 15 CM
E WAVE DECELERATION TIME: 182.59 MSEC
E/A RATIO: 0.94
E/E' RATIO: 6.38 M/S
ECHO LV POSTERIOR WALL: 1.1 CM (ref 0.6–1.1)
EOSINOPHIL # BLD AUTO: 0.1 K/UL (ref 0–0.5)
EOSINOPHIL NFR BLD: 1.5 % (ref 0–8)
ERYTHROCYTE [DISTWIDTH] IN BLOOD BY AUTOMATED COUNT: 12.3 % (ref 11.5–14.5)
EST. GFR  (NO RACE VARIABLE): >60 ML/MIN/1.73 M^2
FRACTIONAL SHORTENING: 30 % (ref 28–44)
GLUCOSE SERPL-MCNC: 137 MG/DL (ref 70–110)
HCT VFR BLD AUTO: 39.8 % (ref 40–54)
HGB BLD-MCNC: 13 G/DL (ref 14–18)
IMM GRANULOCYTES # BLD AUTO: 0.02 K/UL (ref 0–0.04)
IMM GRANULOCYTES NFR BLD AUTO: 0.2 % (ref 0–0.5)
INR PPP: 1 (ref 0.8–1.2)
INTERVENTRICULAR SEPTUM: 0.98 CM (ref 0.6–1.1)
IVC DIAMETER: 1.77 CM
LA MAJOR: 5.7 CM
LA MINOR: 4.97 CM
LA WIDTH: 3.9 CM
LEFT ATRIUM SIZE: 3.85 CM
LEFT ATRIUM VOLUME INDEX: 33.1 ML/M2
LEFT ATRIUM VOLUME: 67.77 CM3
LEFT INTERNAL DIMENSION IN SYSTOLE: 3.23 CM (ref 2.1–4)
LEFT VENTRICLE DIASTOLIC VOLUME INDEX: 48.37 ML/M2
LEFT VENTRICLE DIASTOLIC VOLUME: 99.16 ML
LEFT VENTRICLE MASS INDEX: 83 G/M2
LEFT VENTRICLE SYSTOLIC VOLUME INDEX: 20.5 ML/M2
LEFT VENTRICLE SYSTOLIC VOLUME: 41.94 ML
LEFT VENTRICULAR INTERNAL DIMENSION IN DIASTOLE: 4.64 CM (ref 3.5–6)
LEFT VENTRICULAR MASS: 169.97 G
LV LATERAL E/E' RATIO: 5.15 M/S
LV SEPTAL E/E' RATIO: 8.38 M/S
LVOT MG: 0.88 MMHG
LVOT MV: 0.43 CM/S
LYMPHOCYTES # BLD AUTO: 2.8 K/UL (ref 1–4.8)
LYMPHOCYTES NFR BLD: 35.1 % (ref 18–48)
MAGNESIUM SERPL-MCNC: 2.3 MG/DL (ref 1.6–2.6)
MCH RBC QN AUTO: 27.7 PG (ref 27–31)
MCHC RBC AUTO-ENTMCNC: 32.7 G/DL (ref 32–36)
MCV RBC AUTO: 85 FL (ref 82–98)
MONOCYTES # BLD AUTO: 0.5 K/UL (ref 0.3–1)
MONOCYTES NFR BLD: 6.7 % (ref 4–15)
MV MEAN GRADIENT: 1 MMHG
MV PEAK A VEL: 0.71 M/S
MV PEAK E VEL: 0.67 M/S
MV PEAK GRADIENT: 3 MMHG
MV STENOSIS PRESSURE HALF TIME: 52.95 MS
MV VALVE AREA BY CONTINUITY EQUATION: 1.48 CM2
MV VALVE AREA P 1/2 METHOD: 4.15 CM2
NEUTROPHILS # BLD AUTO: 4.5 K/UL (ref 1.8–7.7)
NEUTROPHILS NFR BLD: 55.9 % (ref 38–73)
NRBC BLD-RTO: 0 /100 WBC
OHS LV EJECTION FRACTION SIMPSONS BIPLANE MOD: 5 %
PISA TR MAX VEL: 1.96 M/S
PLATELET # BLD AUTO: 316 K/UL (ref 150–450)
PMV BLD AUTO: 9.6 FL (ref 9.2–12.9)
POCT GLUCOSE: 127 MG/DL (ref 70–110)
POCT GLUCOSE: 169 MG/DL (ref 70–110)
POCT GLUCOSE: 191 MG/DL (ref 70–110)
POTASSIUM SERPL-SCNC: 4 MMOL/L (ref 3.5–5.1)
PROT SERPL-MCNC: 8 G/DL (ref 6–8.4)
PROTHROMBIN TIME: 10.5 SEC (ref 9–12.5)
PULM VEIN S/D RATIO: 1.73
PV PEAK D VEL: 0.26 M/S
PV PEAK GRADIENT: 2 MMHG
PV PEAK S VEL: 0.45 M/S
PV PEAK VELOCITY: 0.71 M/S
RA MAJOR: 4.78 CM
RA PRESSURE ESTIMATED: 8 MMHG
RA WIDTH: 4 CM
RBC # BLD AUTO: 4.7 M/UL (ref 4.6–6.2)
RIGHT VENTRICULAR END-DIASTOLIC DIMENSION: 3.41 CM
RV TB RVSP: 10 MMHG
RV TISSUE DOPPLER FREE WALL SYSTOLIC VELOCITY 1 (APICAL 4 CHAMBER VIEW): 12.39 CM/S
SINUS: 2.79 CM
SODIUM SERPL-SCNC: 138 MMOL/L (ref 136–145)
STJ: 2.43 CM
TDI LATERAL: 0.13 M/S
TDI SEPTAL: 0.08 M/S
TDI: 0.11 M/S
TR MAX PG: 15 MMHG
TRICUSPID ANNULAR PLANE SYSTOLIC EXCURSION: 2.36 CM
TV REST PULMONARY ARTERY PRESSURE: 23 MMHG
WBC # BLD AUTO: 8.07 K/UL (ref 3.9–12.7)
Z-SCORE OF LEFT VENTRICULAR DIMENSION IN END DIASTOLE: -2.82
Z-SCORE OF LEFT VENTRICULAR DIMENSION IN END SYSTOLE: -1.22

## 2023-08-15 PROCEDURE — 92610 EVALUATE SWALLOWING FUNCTION: CPT

## 2023-08-15 PROCEDURE — 93010 ELECTROCARDIOGRAM REPORT: CPT | Mod: ,,, | Performed by: INTERNAL MEDICINE

## 2023-08-15 PROCEDURE — 63600175 PHARM REV CODE 636 W HCPCS: Performed by: HOSPITALIST

## 2023-08-15 PROCEDURE — 96372 THER/PROPH/DIAG INJ SC/IM: CPT | Performed by: HOSPITALIST

## 2023-08-15 PROCEDURE — 82962 GLUCOSE BLOOD TEST: CPT

## 2023-08-15 PROCEDURE — 93010 EKG 12-LEAD: ICD-10-PCS | Mod: ,,, | Performed by: INTERNAL MEDICINE

## 2023-08-15 PROCEDURE — G0378 HOSPITAL OBSERVATION PER HR: HCPCS

## 2023-08-15 PROCEDURE — 85730 THROMBOPLASTIN TIME PARTIAL: CPT | Performed by: EMERGENCY MEDICINE

## 2023-08-15 PROCEDURE — 93005 ELECTROCARDIOGRAM TRACING: CPT

## 2023-08-15 PROCEDURE — 85610 PROTHROMBIN TIME: CPT | Performed by: EMERGENCY MEDICINE

## 2023-08-15 PROCEDURE — 85025 COMPLETE CBC W/AUTO DIFF WBC: CPT | Performed by: EMERGENCY MEDICINE

## 2023-08-15 PROCEDURE — 25000003 PHARM REV CODE 250: Performed by: EMERGENCY MEDICINE

## 2023-08-15 PROCEDURE — 25000003 PHARM REV CODE 250: Performed by: HOSPITALIST

## 2023-08-15 PROCEDURE — 80053 COMPREHEN METABOLIC PANEL: CPT | Performed by: EMERGENCY MEDICINE

## 2023-08-15 PROCEDURE — 83735 ASSAY OF MAGNESIUM: CPT | Performed by: EMERGENCY MEDICINE

## 2023-08-15 PROCEDURE — 99285 EMERGENCY DEPT VISIT HI MDM: CPT | Mod: 25

## 2023-08-15 RX ORDER — GLUCAGON 1 MG
1 KIT INJECTION
Status: DISCONTINUED | OUTPATIENT
Start: 2023-08-15 | End: 2023-08-16 | Stop reason: HOSPADM

## 2023-08-15 RX ORDER — GABAPENTIN 300 MG/1
300 CAPSULE ORAL 3 TIMES DAILY
COMMUNITY
Start: 2023-06-13

## 2023-08-15 RX ORDER — ATORVASTATIN CALCIUM 80 MG/1
80 TABLET, FILM COATED ORAL DAILY
COMMUNITY
Start: 2023-07-21

## 2023-08-15 RX ORDER — ASPIRIN 81 MG/1
81 TABLET ORAL
COMMUNITY
Start: 2023-07-21 | End: 2023-08-15

## 2023-08-15 RX ORDER — INSULIN GLARGINE 100 [IU]/ML
60 INJECTION, SOLUTION SUBCUTANEOUS 2 TIMES DAILY
Status: ON HOLD | COMMUNITY
Start: 2023-07-21 | End: 2023-08-16 | Stop reason: SDUPTHER

## 2023-08-15 RX ORDER — FLUOXETINE HYDROCHLORIDE 40 MG/1
40 CAPSULE ORAL DAILY
COMMUNITY
Start: 2023-07-21

## 2023-08-15 RX ORDER — AMLODIPINE BESYLATE 5 MG/1
5 TABLET ORAL
COMMUNITY
Start: 2023-07-21

## 2023-08-15 RX ORDER — ASPIRIN 81 MG/1
81 TABLET ORAL DAILY
Status: DISCONTINUED | OUTPATIENT
Start: 2023-08-15 | End: 2023-08-16 | Stop reason: HOSPADM

## 2023-08-15 RX ORDER — HYDRALAZINE HYDROCHLORIDE 25 MG/1
50 TABLET, FILM COATED ORAL EVERY 4 HOURS PRN
Status: DISCONTINUED | OUTPATIENT
Start: 2023-08-15 | End: 2023-08-16 | Stop reason: HOSPADM

## 2023-08-15 RX ORDER — HYDROCHLOROTHIAZIDE 25 MG/1
25 TABLET ORAL DAILY
Status: DISCONTINUED | OUTPATIENT
Start: 2023-08-15 | End: 2023-08-16 | Stop reason: HOSPADM

## 2023-08-15 RX ORDER — CLOPIDOGREL BISULFATE 75 MG/1
75 TABLET ORAL DAILY
Status: DISCONTINUED | OUTPATIENT
Start: 2023-08-15 | End: 2023-08-16 | Stop reason: HOSPADM

## 2023-08-15 RX ORDER — ENOXAPARIN SODIUM 100 MG/ML
40 INJECTION SUBCUTANEOUS EVERY 24 HOURS
Status: DISCONTINUED | OUTPATIENT
Start: 2023-08-15 | End: 2023-08-16 | Stop reason: HOSPADM

## 2023-08-15 RX ORDER — CLONIDINE HYDROCHLORIDE 0.1 MG/1
0.1 TABLET ORAL
Status: COMPLETED | OUTPATIENT
Start: 2023-08-15 | End: 2023-08-15

## 2023-08-15 RX ORDER — INSULIN ASPART 100 [IU]/ML
1-10 INJECTION, SOLUTION INTRAVENOUS; SUBCUTANEOUS EVERY 4 HOURS PRN
Status: DISCONTINUED | OUTPATIENT
Start: 2023-08-15 | End: 2023-08-16 | Stop reason: HOSPADM

## 2023-08-15 RX ORDER — LOSARTAN POTASSIUM 100 MG/1
100 TABLET ORAL DAILY
COMMUNITY
Start: 2023-07-21

## 2023-08-15 RX ORDER — POTASSIUM CHLORIDE 20 MEQ/1
20 TABLET, EXTENDED RELEASE ORAL
COMMUNITY
Start: 2023-07-21 | End: 2023-08-15

## 2023-08-15 RX ORDER — ATORVASTATIN CALCIUM 40 MG/1
40 TABLET, FILM COATED ORAL DAILY
Status: DISCONTINUED | OUTPATIENT
Start: 2023-08-15 | End: 2023-08-16 | Stop reason: HOSPADM

## 2023-08-15 RX ADMIN — CLONIDINE HYDROCHLORIDE 0.1 MG: 0.1 TABLET ORAL at 10:08

## 2023-08-15 RX ADMIN — ASPIRIN 81 MG: 81 TABLET, COATED ORAL at 12:08

## 2023-08-15 RX ADMIN — INSULIN ASPART 1 UNITS: 100 INJECTION, SOLUTION INTRAVENOUS; SUBCUTANEOUS at 10:08

## 2023-08-15 RX ADMIN — INSULIN ASPART 2 UNITS: 100 INJECTION, SOLUTION INTRAVENOUS; SUBCUTANEOUS at 04:08

## 2023-08-15 RX ADMIN — HYDROCHLOROTHIAZIDE 25 MG: 25 TABLET ORAL at 09:08

## 2023-08-15 RX ADMIN — ENOXAPARIN SODIUM 40 MG: 40 INJECTION SUBCUTANEOUS at 04:08

## 2023-08-15 RX ADMIN — CLOPIDOGREL BISULFATE 75 MG: 75 TABLET ORAL at 12:08

## 2023-08-15 RX ADMIN — ATORVASTATIN CALCIUM 40 MG: 40 TABLET, FILM COATED ORAL at 12:08

## 2023-08-15 NOTE — SUBJECTIVE & OBJECTIVE
Past Medical History:   Diagnosis Date    Diabetes mellitus     HIV (human immunodeficiency virus infection)     Hypertension        Past Surgical History:   Procedure Laterality Date    NO PAST SURGERIES  03/11/2019       Review of patient's allergies indicates:  No Known Allergies    No current facility-administered medications on file prior to encounter.     Current Outpatient Medications on File Prior to Encounter   Medication Sig    amitriptyline (ELAVIL) 25 MG tablet Take 25 mg by mouth nightly as needed.    aspirin 325 MG tablet Take 1 tablet (325 mg total) by mouth once daily.    atorvastatin (LIPITOR) 40 MG tablet Take 40 mg by mouth once daily.    dolutegravir-rilpivirine (JULUCA) 50-25 mg Tab Take 1 tablet by mouth once daily.    hydroCHLOROthiazide (HYDRODIURIL) 25 MG tablet Take 1 tablet (25 mg total) by mouth 2 (two) times daily.    insulin lispro protamine-insulin lispro (HUMALOG 75/25) 100 unit/mL (75-25) Susp Inject 10 Units into the skin 2 (two) times daily before meals.    lisinopril (PRINIVIL,ZESTRIL) 40 MG tablet Take 1 tablet (40 mg total) by mouth once daily.     Family History    None       Tobacco Use    Smoking status: Never    Smokeless tobacco: Never   Substance and Sexual Activity    Alcohol use: No    Drug use: No    Sexual activity: Not on file     Review of Systems   Constitutional:  Positive for activity change and appetite change.   HENT:  Negative for congestion and dental problem.    Eyes:  Negative for discharge and itching.   Respiratory:  Negative for apnea.    Cardiovascular:  Negative for chest pain and leg swelling.   Gastrointestinal:  Negative for abdominal distention and anal bleeding.   Endocrine: Negative for cold intolerance and heat intolerance.   Genitourinary:  Negative for difficulty urinating and dysuria.   Musculoskeletal:  Negative for arthralgias and back pain.   Skin:  Negative for color change and pallor.   Allergic/Immunologic: Negative for environmental  allergies and food allergies.   Neurological:  Positive for speech difficulty, weakness and numbness.   Hematological:  Negative for adenopathy. Does not bruise/bleed easily.   Psychiatric/Behavioral:  Negative for agitation.      Objective:     Vital Signs (Most Recent):  Temp: 97.9 °F (36.6 °C) (08/15/23 1033)  Pulse: 64 (08/15/23 1225)  Resp: 20 (08/15/23 1033)  BP: (!) 173/85 (08/15/23 1225)  SpO2: 97 % (08/15/23 1225) Vital Signs (24h Range):  Temp:  [97.9 °F (36.6 °C)] 97.9 °F (36.6 °C)  Pulse:  [64-99] 64  Resp:  [20] 20  SpO2:  [97 %-100 %] 97 %  BP: (173-199)/() 173/85     Weight: 88.5 kg (195 lb)  Body mass index is 27.2 kg/m².     Physical Exam  Constitutional:       Appearance: Normal appearance.   HENT:      Head: Normocephalic.      Nose: Nose normal.      Mouth/Throat:      Mouth: Mucous membranes are dry.   Eyes:      Extraocular Movements: Extraocular movements intact.      Pupils: Pupils are equal, round, and reactive to light.   Cardiovascular:      Rate and Rhythm: Normal rate and regular rhythm.      Heart sounds: No murmur heard.  Pulmonary:      Effort: No respiratory distress.      Breath sounds: No wheezing.   Abdominal:      General: Abdomen is flat. There is no distension.      Palpations: Abdomen is soft.      Tenderness: There is no abdominal tenderness.   Musculoskeletal:         General: No swelling or deformity.      Cervical back: Neck supple.   Skin:     General: Skin is warm and dry.   Neurological:      Mental Status: He is alert.      Comments: Right jaw,face,arm and hand numbness,   Psychiatric:         Mood and Affect: Mood normal.         Behavior: Behavior normal.              CRANIAL NERVES     CN III, IV, VI   Pupils are equal, round, and reactive to light.       Significant Labs: All pertinent labs within the past 24 hours have been reviewed.  BMP:   Recent Labs   Lab 08/15/23  1103   *      K 4.0      CO2 25   BUN 12   CREATININE 1.3   CALCIUM 9.5    MG 2.3     CBC:   Recent Labs   Lab 08/15/23  1103   WBC 8.07   HGB 13.0*   HCT 39.8*        CMP:   Recent Labs   Lab 08/15/23  1103      K 4.0      CO2 25   *   BUN 12   CREATININE 1.3   CALCIUM 9.5   PROT 8.0   ALBUMIN 4.2   BILITOT 0.3   ALKPHOS 97   AST 22   ALT 19   ANIONGAP 8       Significant Imaging: I have reviewed all pertinent imaging results/findings within the past 24 hours.

## 2023-08-15 NOTE — ASSESSMENT & PLAN NOTE
"This patient in known to have HIV+ status (Have detected HIV PCR but never CD4 <200 or AIDS defining illness). Labs reviewed- No results found for: "CD4", No results found for: "HIVDNAPCR". Patient is on HAART. Will continue HAART. Prophylaxis was not indicated at this time-. Continue to monitor routine labs. Last CD4 count was   Lab Results   Component Value Date    ABSOLUTECD4 1173 05/21/2019       We will not consult Infectious disease at this time. Other HIV-associated diseases are not present.    "

## 2023-08-15 NOTE — PLAN OF CARE
[place] - Emergency Dept  Discharge Assessment    Primary Care Provider: [facility]   Case Management Assessment     PCP: Dr. Galvan at [facility]  Pharmacy: [facility] on [address]    Patient Arrived From: Home  Existing Help at Home: Sister    Barriers to Discharge: NONE    Discharge Plan:    A. HOME   B. HOME      Discharge planning assessment completed with patient's assistance.  Patient from home alone and independent.  Patient has no DMe or OP services.  When medically cleared, patent will discharge to home.  He will need followup with his PCP and additional appointments as ordered by the discharging provider.          Discharge Assessment (most recent)       BRIEF DISCHARGE ASSESSMENT - 08/15/23 1232          Discharge Planning    Assessment Type Discharge Planning Brief Assessment     Resource/Environmental Concerns none     Support Systems Family members     Assistance Needed None     Equipment Currently Used at Home none     Current Living Arrangements home;apartment     Care Facility Name n/a     Patient/Family Anticipates Transition to home     Patient/Family Anticipated Services at Transition outpatient care   PCP followup    DME Needed Upon Discharge  none     Discharge Plan A Home     Discharge Plan B Home

## 2023-08-15 NOTE — HPI
Lore Shrestha is a 58 y.o. male with a PMHx of DM, and HTN, who presents to the ED for evaluation of right hand pain onset yesterday around noon. Patient also c/o right leg stiffness, intermittent dry cough, and numbness to the right-side of his upper and lower lips. Patient states he was seen by his PCP this morning and advised to come here. Patient denies shortness of breath, chest pain, fever, chills, abdominal pain, nausea, vomiting, diarrhea, dysuria, headaches, congestion, sore throat, arm or leg trouble, eye pain, ear pain, rash, or other associated symptoms.CT head show,Interval development of small focal hypodensities within the head of the left caudate nucleus and within the right basal ganglia suggestive of small lacunar infarcts, likely subacute to remote in age.   Small remote lacunar infarct involving the right thalamus. No major vascular distribution infarcts are identified.  No evidence for intracranial hemorrhage.  Started patient on ASA.Plavix,statin,and consulted neurology.

## 2023-08-15 NOTE — PT/OT/SLP EVAL
Speech Language Pathology Evaluation  Bedside Swallow    Patient Name:  Lore Shrestha   MRN:  [id number]  Admitting Diagnosis: CVA (cerebral vascular accident)    Recommendations:                 General Recommendations:  Cognitive-linguistic evaluation  Diet recommendations:  Regular, Thin   Aspiration Precautions: 1 bite/sip at a time   General Precautions: Standard,    Communication strategies:  none    Assessment:     Lore Shrestha is a 58 y.o. male with dx of CVA he presents with functional swallow at reported baseline.     History:     Past Medical History:   Diagnosis Date    Diabetes mellitus     HIV (human immunodeficiency virus infection)     Hypertension        Past Surgical History:   Procedure Laterality Date    NO PAST SURGERIES  03/11/2019       Social History: Patient IND for ADLs    Prior diet: unrestricted    Occupation/hobbies/homemaking: night shift ashleigh at [employer]   Pt with good recall of what led to hospital admission, he is groggy reporting he worked last night.   Patient goals: return to medical status baseline    Pain/Comfort:  Pain Rating 1: 0/10    Respiratory Status: Room air    Objective:     Oral Musculature Evaluation  Oral Musculature: WFL  Dentition: present and adequate  Oral Labial Strength and Mobility: WFL  Lingual Strength and Mobility: WFL  Velar Elevation: WFL  Buccal Strength and Mobility: WFL  Voice Prior to PO Intake: wfl  Oral Musculature Comments: wfl    Bedside Swallow Eval:   Consistencies Assessed:  Thin liquids ~4oz via straw  Solids x crackers      Oral Phase:   WFL    Pharyngeal Phase:   no overt clinical signs/symptoms of aspiration    Compensatory Strategies  None    Treatment: please note silent aspiration cannot be r/o at bedside.     Goals:   Multidisciplinary Problems       SLP Goals          Problem: SLP    Goal Priority Disciplines Outcome   SLP Goal    Low SLP Ongoing, Progressing   Description: Pt will participate in clinical swallow eval  (goal met 8/15)  Pt will participate in cognitive linguistic eval                       Plan:     Patient to be seen:  3 x/week   Plan of Care expires:  08/17/23  Plan of Care reviewed with:  patient   SLP Follow-Up:  Yes       Discharge recommendations:  other (see comments) (tbd)   Barriers to Discharge:  None    Time Tracking:     SLP Treatment Date:   08/15/23  Speech Start Time:  1442  Speech Stop Time:  1452     Speech Total Time (min):  10 min    Billable Minutes: Eval Swallow and Oral Function 10    08/15/2023

## 2023-08-15 NOTE — H&P
[facility]  [facility]  History & Physical    Patient Name: Lore Shrestha  MRN: [id number]  Patient Class: OP- Observation  Admission Date: 8/15/2023  Attending Physician: Patricia Boyle, Cornel   Primary Care Provider: [facility]         Patient information was obtained from patient and ER records.     Subjective:     Principal Problem:CVA (cerebral vascular accident)    Chief Complaint:   Chief Complaint   Patient presents with    Numbness     Pt reports numbness to his right jaw and right arm that started yesterday at noon.  Pt denies any numbness or tingling any where else.   PT saw his PCP this am and advised to come to the ED to be checked out.  Stroke rule out called.         HPI: Lore Shrestha is a 58 y.o. male with a PMHx of DM, and HTN, who presents to the ED for evaluation of right hand pain onset yesterday around noon. Patient also c/o right leg stiffness, intermittent dry cough, and numbness to the right-side of his upper and lower lips. Patient states he was seen by his PCP this morning and advised to come here. Patient denies shortness of breath, chest pain, fever, chills, abdominal pain, nausea, vomiting, diarrhea, dysuria, headaches, congestion, sore throat, arm or leg trouble, eye pain, ear pain, rash, or other associated symptoms.CT head show,Interval development of small focal hypodensities within the head of the left caudate nucleus and within the right basal ganglia suggestive of small lacunar infarcts, likely subacute to remote in age.   Small remote lacunar infarct involving the right thalamus. No major vascular distribution infarcts are identified.  No evidence for intracranial hemorrhage.  Started patient on ASA.Plavix,statin,and consulted neurology.      Past Medical History:   Diagnosis Date    Diabetes mellitus     HIV (human immunodeficiency virus infection)     Hypertension        Past Surgical History:   Procedure Laterality Date     NO PAST SURGERIES  03/11/2019       Review of patient's allergies indicates:  No Known Allergies    No current facility-administered medications on file prior to encounter.     Current Outpatient Medications on File Prior to Encounter   Medication Sig    amitriptyline (ELAVIL) 25 MG tablet Take 25 mg by mouth nightly as needed.    aspirin 325 MG tablet Take 1 tablet (325 mg total) by mouth once daily.    atorvastatin (LIPITOR) 40 MG tablet Take 40 mg by mouth once daily.    dolutegravir-rilpivirine (JULUCA) 50-25 mg Tab Take 1 tablet by mouth once daily.    hydroCHLOROthiazide (HYDRODIURIL) 25 MG tablet Take 1 tablet (25 mg total) by mouth 2 (two) times daily.    insulin lispro protamine-insulin lispro (HUMALOG 75/25) 100 unit/mL (75-25) Susp Inject 10 Units into the skin 2 (two) times daily before meals.    lisinopril (PRINIVIL,ZESTRIL) 40 MG tablet Take 1 tablet (40 mg total) by mouth once daily.     Family History    None       Tobacco Use    Smoking status: Never    Smokeless tobacco: Never   Substance and Sexual Activity    Alcohol use: No    Drug use: No    Sexual activity: Not on file     Review of Systems   Constitutional:  Positive for activity change and appetite change.   HENT:  Negative for congestion and dental problem.    Eyes:  Negative for discharge and itching.   Respiratory:  Negative for apnea.    Cardiovascular:  Negative for chest pain and leg swelling.   Gastrointestinal:  Negative for abdominal distention and anal bleeding.   Endocrine: Negative for cold intolerance and heat intolerance.   Genitourinary:  Negative for difficulty urinating and dysuria.   Musculoskeletal:  Negative for arthralgias and back pain.   Skin:  Negative for color change and pallor.   Allergic/Immunologic: Negative for environmental allergies and food allergies.   Neurological:  Positive for speech difficulty, weakness and numbness.   Hematological:  Negative for adenopathy. Does not bruise/bleed easily.    Psychiatric/Behavioral:  Negative for agitation.      Objective:     Vital Signs (Most Recent):  Temp: 97.9 °F (36.6 °C) (08/15/23 1033)  Pulse: 64 (08/15/23 1225)  Resp: 20 (08/15/23 1033)  BP: (!) 173/85 (08/15/23 1225)  SpO2: 97 % (08/15/23 1225) Vital Signs (24h Range):  Temp:  [97.9 °F (36.6 °C)] 97.9 °F (36.6 °C)  Pulse:  [64-99] 64  Resp:  [20] 20  SpO2:  [97 %-100 %] 97 %  BP: (173-199)/() 173/85     Weight: 88.5 kg (195 lb)  Body mass index is 27.2 kg/m².     Physical Exam  Constitutional:       Appearance: Normal appearance.   HENT:      Head: Normocephalic.      Nose: Nose normal.      Mouth/Throat:      Mouth: Mucous membranes are dry.   Eyes:      Extraocular Movements: Extraocular movements intact.      Pupils: Pupils are equal, round, and reactive to light.   Cardiovascular:      Rate and Rhythm: Normal rate and regular rhythm.      Heart sounds: No murmur heard.  Pulmonary:      Effort: No respiratory distress.      Breath sounds: No wheezing.   Abdominal:      General: Abdomen is flat. There is no distension.      Palpations: Abdomen is soft.      Tenderness: There is no abdominal tenderness.   Musculoskeletal:         General: No swelling or deformity.      Cervical back: Neck supple.   Skin:     General: Skin is warm and dry.   Neurological:      Mental Status: He is alert.      Comments: Right jaw,face,arm and hand numbness,   Psychiatric:         Mood and Affect: Mood normal.         Behavior: Behavior normal.              CRANIAL NERVES     CN III, IV, VI   Pupils are equal, round, and reactive to light.       Significant Labs: All pertinent labs within the past 24 hours have been reviewed.  BMP:   Recent Labs   Lab 08/15/23  1103   *      K 4.0      CO2 25   BUN 12   CREATININE 1.3   CALCIUM 9.5   MG 2.3     CBC:   Recent Labs   Lab 08/15/23  1103   WBC 8.07   HGB 13.0*   HCT 39.8*        CMP:   Recent Labs   Lab 08/15/23  1103      K 4.0      CO2  25   *   BUN 12   CREATININE 1.3   CALCIUM 9.5   PROT 8.0   ALBUMIN 4.2   BILITOT 0.3   ALKPHOS 97   AST 22   ALT 19   ANIONGAP 8       Significant Imaging: I have reviewed all pertinent imaging results/findings within the past 24 hours.    Assessment/Plan:     * CVA (cerebral vascular accident)  CT head show,Interval development of small focal hypodensities within the head of the left caudate nucleus and within the right basal ganglia suggestive of small lacunar infarcts, likely subacute to remote in age.   Small remote lacunar infarct involving the right thalamus. No major vascular distribution infarcts are identified.  No evidence for intracranial hemorrhage.  Started patient on ASA.Plavix,statin,and consulted neurology.  Antithrombotics for secondary stroke prevention: Antiplatelets: Aspirin: 81 mg daily  Clopidogrel: 75 mg daily    Statins for secondary stroke prevention and hyperlipidemia, if present:   Statins: Atorvastatin- 40 mg daily    Aggressive risk factor modification: HTN, DM     Rehab efforts: The patient has been evaluated by a stroke team provider and the therapy needs have been fully considered based off the presenting complaints and exam findings. The following therapy evaluations are needed: PT evaluate and treat, OT evaluate and treat, SLP evaluate and treat    Diagnostics ordered/pending: Carotid ultrasound to assess vasculature, MRA head to assess vasculature, MRI head without contrast to assess brain parenchyma    VTE prophylaxis: Enoxaparin 40 mg SQ every 24 hours    BP parameters:permissive HTN.        DM (diabetes mellitus), type 2  Patient's FSGs are controlled on current medication regimen.  Last A1c reviewed-   Lab Results   Component Value Date    HGBA1C 8.5 (H) 03/11/2019     Most recent fingerstick glucose reviewed-   Recent Labs   Lab 08/15/23  1036   POCTGLUCOSE 127*     Current correctional scale  Medium  Maintain anti-hyperglycemic dose as follows-   Antihyperglycemics  "(From admission, onward)    Start     Stop Route Frequency Ordered    08/15/23 1323  insulin aspart U-100 pen 1-10 Units         -- SubQ Every 4 hours PRN 08/15/23 1223        Hold Oral hypoglycemics while patient is in the hospital.    Hyperlipidemia  On stain,check lipid panel.      Essential hypertension  Permissive HTN at this time.      HIV (human immunodeficiency virus infection)  This patient in known to have HIV+ status (Have detected HIV PCR but never CD4 <200 or AIDS defining illness). Labs reviewed- No results found for: "CD4", No results found for: "HIVDNAPCR". Patient is on HAART. Will continue HAART. Prophylaxis was not indicated at this time-. Continue to monitor routine labs. Last CD4 count was   Lab Results   Component Value Date    ABSOLUTECD4 1173 05/21/2019       We will not consult Infectious disease at this time. Other HIV-associated diseases are not present.        VTE Risk Mitigation (From admission, onward)         Ordered     enoxaparin injection 40 mg  Every 24 hours         08/15/23 1225                   On 08/15/2023, patient should be placed in hospital observation services under my care.        Patricia Boyle MD  Department of Hospital Medicine  [facility] [place] - Emergency Dept  "

## 2023-08-15 NOTE — ED PROVIDER NOTES
Encounter Date: 8/15/2023    SCRIBE #1 NOTE: I, Oz Baca, am scribing for, and in the presence of,  Jake Tavarez MD. Other sections scribed: GIOVANNI, MARISOL.       History     Chief Complaint   Patient presents with    Numbness     Pt reports numbness to his right jaw and right arm that started yesterday at noon.  Pt denies any numbness or tingling any where else.   PT saw his PCP this am and advised to come to the ED to be checked out.  Stroke rule out called.      Lore Shrestha is a 58 y.o. male with a PMHx of DM, and HTN, who presents to the ED for evaluation of right hand pain onset yesterday around noon. Patient also c/o right leg stiffness, intermittent dry cough, and numbness to the right-side of his upper and lower lips. Patient states he was seen by his PCP this morning and advised to come here. Patient denies shortness of breath, chest pain, fever, chills, abdominal pain, nausea, vomiting, diarrhea, dysuria, headaches, congestion, sore throat, arm or leg trouble, eye pain, ear pain, rash, or other associated symptoms.        The history is provided by the patient. No  was used.     Review of patient's allergies indicates:  No Known Allergies  Past Medical History:   Diagnosis Date    Diabetes mellitus     HIV (human immunodeficiency virus infection)     Hypertension      Past Surgical History:   Procedure Laterality Date    NO PAST SURGERIES  03/11/2019     History reviewed. No pertinent family history.  Social History     Tobacco Use    Smoking status: Never    Smokeless tobacco: Never   Substance Use Topics    Alcohol use: No    Drug use: No     Review of Systems   Constitutional:  Negative for chills and fever.   HENT:  Negative for congestion, ear pain and sore throat.    Eyes:  Negative for pain.   Respiratory:  Positive for cough. Negative for shortness of breath.    Cardiovascular:  Negative for chest pain.   Gastrointestinal:  Negative for abdominal pain, diarrhea, nausea and  vomiting.   Genitourinary:  Negative for dysuria.   Musculoskeletal:         (-) Arm trouble (+) leg stiffness (+) hand pain   Skin:  Negative for rash.   Neurological:  Positive for numbness. Negative for headaches.       Physical Exam     Initial Vitals [08/15/23 1033]   BP Pulse Resp Temp SpO2   (!) 199/101 99 20 97.9 °F (36.6 °C) 100 %      MAP       --         Physical Exam  The patient was examined specifically for the following:   General:No significant distress, Good color, Warm and dry. Head and neck:Scalp atraumatic, Neck supple. Neurological:Appropriate conversation, Gross motor deficits. Eyes:Conjugate gaze, Clear corneas. ENT: No epistaxis. Cardiac: Regular rate and rhythm, Grossly normal heart tones. Pulmonary: Wheezing, Rales. Gastrointestinal: Abdominal tenderness, Abdominal distention. Musculoskeletal: Extremity deformity, Apparent pain with range of motion of the joints. Skin: Rash.   The findings on examination were normal except for the following:  There may be some slight asymmetry in the sensory perception in the hands.  There may be some slight asymmetry in the sensory perception of the upper and lower lips.  Mental status examination, cranial nerves, motor and sensory examination are otherwise unremarkable.  Lungs are clear the heart tones are normal.  The abdomen is soft.  ED Course   Critical Care    Date/Time: 9/19/2023 9:39 PM    Performed by: Jake Tavarez MD  Authorized by: Patricia Boyle MD  Direct patient critical care time: 22 minutes  Additional history critical care time: 11 minutes  Ordering / reviewing critical care time: 11 minutes  Documentation critical care time: 11 minutes  Consulting other physicians critical care time: 11 minutes  Total critical care time (exclusive of procedural time) : 66 minutes  Critical care time was exclusive of teaching time and separately billable procedures and treating other patients.  Critical care was necessary to treat or prevent  imminent or life-threatening deterioration of the following conditions: CNS failure or compromise.  Critical care was time spent personally by me on the following activities: development of treatment plan with patient or surrogate, discussions with primary provider, evaluation of patient's response to treatment, examination of patient, obtaining history from patient or surrogate, ordering and performing treatments and interventions, ordering and review of laboratory studies, ordering and review of radiographic studies, pulse oximetry, re-evaluation of patient's condition and review of old charts.        Labs Reviewed   COMPREHENSIVE METABOLIC PANEL - Abnormal; Notable for the following components:       Result Value    Glucose 137 (*)     All other components within normal limits   CBC W/ AUTO DIFFERENTIAL - Abnormal; Notable for the following components:    Hemoglobin 13.0 (*)     Hematocrit 39.8 (*)     All other components within normal limits   POCT GLUCOSE - Abnormal; Notable for the following components:    POCT Glucose 127 (*)     All other components within normal limits   MAGNESIUM   PROTIME-INR   APTT     EKG Readings: (Independently Interpreted)   This patient is in normal sinus rhythm heart rate of 63.  There are no significant ST segment or T-wave changes.  The axis is normal.  There is no evidence of acute myocardial infarction or malignant arrhythmia.  Sepsis this is doctor Daysi dictating and I independently interpreted this EKG.     ECG Results              EKG 12-lead (Final result)  Result time 08/16/23 21:48:44      Final result by Interface, Lab In [facility] (08/16/23 21:48:44)                   Narrative:    Test Reason : I63.9,    Vent. Rate : 063 BPM     Atrial Rate : 063 BPM     P-R Int : 166 ms          QRS Dur : 078 ms      QT Int : 432 ms       P-R-T Axes : 062 062 058 degrees     QTc Int : 442 ms    Normal sinus rhythm  Normal ECG  When compared with ECG of 08-AUG-2023 08:25,  No  significant change was found  Confirmed by Raoul Eugene MD ([id number]) on 8/16/2023 9:48:40 PM    Referred By: [email or web address]   SELF           Confirmed By:Raoul Eugene MD                                     EKG 12-LEAD (Final result)  Result time 08/18/23 12:33:31      Final result by Unknown User (08/18/23 12:33:31)                                         EKG 12-LEAD (Final result)  Result time 08/29/23 11:00:42      Final result by Unknown User (08/29/23 11:00:42)                                      Imaging Results              MRI Brain Without Contrast (Final result)  Result time 08/15/23 15:47:41      Final result by Mike Carr MD (08/15/23 15:47:41)                   Impression:      MRI brain:    No MR evidence of acute infarction.    Old lacunar-type infarctions in the left caudate head, right basal ganglia, and thalami.    MRA brain:    No large vessel occlusion in the intracranial circulation.    High-grade stenosis of the right P2.  CT angiogram may be obtained for further evaluation.      Electronically signed by: Mike Carr MD  Date:    08/15/2023  Time:    15:47               Narrative:    EXAMINATION:  MRI BRAIN WITHOUT CONTRAST; MRA BRAIN WITHOUT CONTRAST    CLINICAL HISTORY:  Stroke, follow up;; Stroke/TIA, determine embolic source;    TECHNIQUE:  Multiplanar multisequence MR imaging of the brain was performed without contrast.    Per separate acquisition, 3D time-of-flight MRA intracranial vessels was performed.  MIP reconstructions were obtained and reviewed.    COMPARISON:  CT scan dated 08/15/2023.    MRI MRA dated 03/12/2019.    FINDINGS:  MRI brain:    The craniocervical junction is intact.The sella and parasellar structures are unremarkable.  The intracranial flow voids are within normal limits.    No acute diffusion-weighted signal abnormalities identified.  The ventricles and sulci are within normal limits for the patient's age.  There are minimal T2/FLAIR signal hyperintense with  vertical in the subcortical white matter.  There are old lacunar-type infarctions within the caudate head in the bilateral thalami.  There is also an old lacunar type infarction in the right basal ganglia.    There are no extra-axial fluid collections.  There is no evidence of intracranial hemorrhage.  There is no evidence of mass effect.    The orbits and intraorbital contents are unremarkable.  The paranasal sinuses are within normal limits.  The mastoid air cells are clear.    MRA brain:    The intracranial segment of the ICA are within normal limits.  There is hypoplastic right A1.  There remaining anterior cerebral arteries are within normal limits.  The middle cerebral arteries are unremarkable.    The intracranial segments of the vertebral arteries are unremarkable.  The basilar is unremarkable.  There is high-grade narrowing involving the right P2    There is no evidence of stenosis or an aneurysm.                                       MRA Brain without contrast (Final result)  Result time 08/15/23 15:47:41   Procedure changed from MRA Brain with contrast     Final result by Mike Carr MD (08/15/23 15:47:41)                   Impression:      MRI brain:    No MR evidence of acute infarction.    Old lacunar-type infarctions in the left caudate head, right basal ganglia, and thalami.    MRA brain:    No large vessel occlusion in the intracranial circulation.    High-grade stenosis of the right P2.  CT angiogram may be obtained for further evaluation.      Electronically signed by: Mike Carr MD  Date:    08/15/2023  Time:    15:47               Narrative:    EXAMINATION:  MRI BRAIN WITHOUT CONTRAST; MRA BRAIN WITHOUT CONTRAST    CLINICAL HISTORY:  Stroke, follow up;; Stroke/TIA, determine embolic source;    TECHNIQUE:  Multiplanar multisequence MR imaging of the brain was performed without contrast.    Per separate acquisition, 3D time-of-flight MRA intracranial vessels was performed.  MIP reconstructions  were obtained and reviewed.    COMPARISON:  CT scan dated 08/15/2023.    MRI MRA dated 03/12/2019.    FINDINGS:  MRI brain:    The craniocervical junction is intact.The sella and parasellar structures are unremarkable.  The intracranial flow voids are within normal limits.    No acute diffusion-weighted signal abnormalities identified.  The ventricles and sulci are within normal limits for the patient's age.  There are minimal T2/FLAIR signal hyperintense with vertical in the subcortical white matter.  There are old lacunar-type infarctions within the caudate head in the bilateral thalami.  There is also an old lacunar type infarction in the right basal ganglia.    There are no extra-axial fluid collections.  There is no evidence of intracranial hemorrhage.  There is no evidence of mass effect.    The orbits and intraorbital contents are unremarkable.  The paranasal sinuses are within normal limits.  The mastoid air cells are clear.    MRA brain:    The intracranial segment of the ICA are within normal limits.  There is hypoplastic right A1.  There remaining anterior cerebral arteries are within normal limits.  The middle cerebral arteries are unremarkable.    The intracranial segments of the vertebral arteries are unremarkable.  The basilar is unremarkable.  There is high-grade narrowing involving the right P2    There is no evidence of stenosis or an aneurysm.                                       US Carotid Bilateral (Final result)  Result time 08/15/23 14:56:26      Final result by Jose Mack MD (08/15/23 14:56:26)                          Final result by Jose Mack MD (08/15/23 14:56:24)                   Impression:      1. No sonographic evidence of hemodynamically significant stenosis of the bilateral extracranial internal carotid arteries.      Electronically signed by: Jose Mack  Date:    08/15/2023  Time:    14:56               Narrative:    EXAMINATION:  US CAROTID BILATERAL    CLINICAL  HISTORY:  R/O stenosis;    TECHNIQUE:  Grayscale and color Doppler ultrasound examination of the carotid and vertebral artery systems bilaterally.  Stenosis estimates are per the NASCET measurement criteria.    COMPARISON:  Carotid Doppler sonogram 03/12/2019    FINDINGS:  Right:    CCA PSV: 55-cm/sec    ICA PSV: 45-cm/sec    ICA EDV: 21-cm/sec    ECA PSV: 71-cm/sec    ICA/CCA PSV ratio: 0.8    Plaque formation: No significant    Vertebral artery: Antegrade flow with normal monophasic waveforms.    Left:    CCA PSV: 64-cm/sec    ICA PSV: 48-cm/sec    ICA EDV: 17-cm/sec    ECA PSV: 83-cm/sec    ICA/CCA PSV ratio: 0.8    Plaque formation: No significant    Vertebral artery: Antegrade flow with normal monophasic waveforms.                                       CT Head Without Contrast (Final result)  Result time 08/15/23 11:58:44      Final result by Zach Ackerman MD (08/15/23 11:58:44)                          Final result by Zach Ackerman MD (08/15/23 11:58:41)                   Impression:      Interval development of small focal hypodensities within the head of the left caudate nucleus and within the right basal ganglia suggestive of small lacunar infarcts, likely subacute to remote in age.    Small remote lacunar infarct involving the right thalamus.    No major vascular distribution infarcts are identified.  No evidence for intracranial hemorrhage.    Mild ethmoid sinus mucosal disease.      Electronically signed by: Zach Ackerman MD  Date:    08/15/2023  Time:    11:58               Narrative:    EXAMINATION:  CT HEAD WITHOUT CONTRAST    CLINICAL HISTORY:  Neuro deficit, acute, stroke suspected; Paresthesia of skin    TECHNIQUE:  Low dose axial images were obtained through the head.  Coronal and sagittal reformations were also performed. Contrast was not administered.    COMPARISON:  03/11/2019.    FINDINGS:  The ventricles are normal in size and configuration.  There is a small focus of decreased  density within the right thalamus likely reflecting a small remote lacunar infarct as noted on MRI examination of the brain dated 03/11/2019.  There has been interval development of focal hypodensities within the head of the left caudate nucleus and within the right basal ganglia suggestive of small lacunar infarcts which are likely subacute to remote in age.  No major vascular distribution infarcts are identified.  There is no evidence for abnormal masses, mass effect, or midline shift.  No abnormal intra or extra-axial fluid collections are identified, specifically there is no evidence for intracranial hemorrhage.  There is mucosal thickening identified within the frontal and ethmoid sinuses.  Mastoid air cells are clear.  Bony calvarium is intact.                                       Medications   cloNIDine tablet 0.1 mg (0.1 mg Oral Given 8/15/23 1057)     Medical Decision Making:   History:   Old Medical Records: I decided to obtain old medical records.  Clinical Tests:   Lab Tests: Ordered and Reviewed  Radiological Study: Ordered and Reviewed  Given the above, this patient presents to the emergency room with some numbness of the upper and lower lips on the right side.  He also has numbness and pain in the right hand.  With an abundance of caution a CT scan was performed.  It revealed subacute lacunar infarcts.  I will place this patient to observation for Neurology evaluation.  There is no hemorrhage.  There is no significant tenderness of the right hand.  The patient has a brisk right radial pulse.  I will defer further treatment and evaluation to Neurology.  Diagnostic studies are essentially otherwise unremarkable.   This patient is outside the time window for ischemic stroke.  The deficits also are very mild.  TPA would not be indicated.        Scribe Attestation:   Scribe #1: I performed the above scribed service and the documentation accurately describes the services I performed. I attest to the  accuracy of the note.              I personally performed the services described in this documentation.  All medical record  entries made by the scribe are at my direction and in my presence.  Signed, Dr. Tavarez       Clinical Impression:   Final diagnoses:  [R20.2] Paresthesias - Right jaw and lips  [I63.9] Ischemic stroke  [M79.609, R20.2] Paresthesia and pain of right extremity - Right hand (Primary)  [I63.81] Lacunar infarction        ED Disposition Condition    Observation Stable                Jake Tavarez MD  08/15/23 1224       Jake Tavarez MD  08/15/23 6350       Jake Tavarez MD  09/19/23 [id number]

## 2023-08-15 NOTE — ASSESSMENT & PLAN NOTE
CT head show,Interval development of small focal hypodensities within the head of the left caudate nucleus and within the right basal ganglia suggestive of small lacunar infarcts, likely subacute to remote in age.   Small remote lacunar infarct involving the right thalamus. No major vascular distribution infarcts are identified.  No evidence for intracranial hemorrhage.  Started patient on ASA.Plavix,statin,and consulted neurology.  Antithrombotics for secondary stroke prevention: Antiplatelets: Aspirin: 81 mg daily  Clopidogrel: 75 mg daily    Statins for secondary stroke prevention and hyperlipidemia, if present:   Statins: Atorvastatin- 40 mg daily    Aggressive risk factor modification: HTN, DM     Rehab efforts: The patient has been evaluated by a stroke team provider and the therapy needs have been fully considered based off the presenting complaints and exam findings. The following therapy evaluations are needed: PT evaluate and treat, OT evaluate and treat, SLP evaluate and treat    Diagnostics ordered/pending: Carotid ultrasound to assess vasculature, MRA head to assess vasculature, MRI head without contrast to assess brain parenchyma    VTE prophylaxis: Enoxaparin 40 mg SQ every 24 hours    BP parameters:permissive HTN.

## 2023-08-15 NOTE — Clinical Note
Diagnosis: Paresthesias [159221]   Future Attending Provider: CONG RAMIREZ [[id number]]   Admitting Provider:: CONG RAMIREZ [[id number]]   Special Needs:: No Special Needs [1]

## 2023-08-15 NOTE — PT/OT/SLP PROGRESS
Speech Language Pathology      Lore ABDON Shrestha  MRN: [id number]    Patient not seen today secondary to Testing/imaging (xray/CT/MRI). Will reattempt Anne-Marie Guillen, OSCAR-SLP

## 2023-08-15 NOTE — ASSESSMENT & PLAN NOTE
Patient's FSGs are controlled on current medication regimen.  Last A1c reviewed-   Lab Results   Component Value Date    HGBA1C 8.5 (H) 03/11/2019     Most recent fingerstick glucose reviewed-   Recent Labs   Lab 08/15/23  1036   POCTGLUCOSE 127*     Current correctional scale  Medium  Maintain anti-hyperglycemic dose as follows-   Antihyperglycemics (From admission, onward)    Start     Stop Route Frequency Ordered    08/15/23 1323  insulin aspart U-100 pen 1-10 Units         -- SubQ Every 4 hours PRN 08/15/23 1223        Hold Oral hypoglycemics while patient is in the hospital.

## 2023-08-15 NOTE — NURSING
Arrived to unit from ED. Pt c/o tingling and numbness to right hand and right side of lip. Bilateral hand  strong, equal. Gait steady. Assessment completed. Will cont to monitor.

## 2023-08-15 NOTE — PHARMACY MED REC
"Admission Medication History     The home medication history was taken by Bianca Dinero CPhT.      You may go to "Admission" then "Reconcile Home Medications" tabs to review and/or act upon these items.     The home medication list has been updated by the Pharmacy department.   Please read ALL comments highlighted in yellow.   Please address this information as you see fit.    Feel free to contact us if you have any questions or require assistance.      The medications listed below were removed from the home medication list. Please reorder if appropriate:  Patient reports no longer taking the following medication(s):  Aspirin 325 mg tab  Humalog 75/25 mg tab  Lisinopril 40 mg tab      Medications listed below were obtained from: Patient/family and Analytic software- [employer]  (Not in a hospital admission)      Potential issues to be addressed PRIOR TO DISCHARGE          Bianca Dinero CPhT.  [phone]                  .          "

## 2023-08-16 VITALS
OXYGEN SATURATION: 97 % | HEIGHT: 71 IN | DIASTOLIC BLOOD PRESSURE: 82 MMHG | HEART RATE: 76 BPM | RESPIRATION RATE: 18 BRPM | BODY MASS INDEX: 26.29 KG/M2 | WEIGHT: 187.81 LBS | SYSTOLIC BLOOD PRESSURE: 154 MMHG | TEMPERATURE: 98 F

## 2023-08-16 LAB
AMPHET+METHAMPHET UR QL: NEGATIVE
BARBITURATES UR QL SCN>200 NG/ML: NEGATIVE
BENZODIAZ UR QL SCN>200 NG/ML: NEGATIVE
BZE UR QL SCN: NEGATIVE
CANNABINOIDS UR QL SCN: NEGATIVE
CHOLEST SERPL-MCNC: 249 MG/DL (ref 120–199)
CHOLEST/HDLC SERPL: 7.5 {RATIO} (ref 2–5)
CREAT UR-MCNC: 173.8 MG/DL (ref 23–375)
HDLC SERPL-MCNC: 33 MG/DL (ref 40–75)
HDLC SERPL: 13.3 % (ref 20–50)
LDLC SERPL CALC-MCNC: 190.8 MG/DL (ref 63–159)
METHADONE UR QL SCN>300 NG/ML: NEGATIVE
NONHDLC SERPL-MCNC: 216 MG/DL
OPIATES UR QL SCN: NEGATIVE
PCP UR QL SCN>25 NG/ML: NEGATIVE
POCT GLUCOSE: 114 MG/DL (ref 70–110)
POCT GLUCOSE: 142 MG/DL (ref 70–110)
POCT GLUCOSE: 158 MG/DL (ref 70–110)
POCT GLUCOSE: 192 MG/DL (ref 70–110)
TOXICOLOGY INFORMATION: NORMAL
TRIGL SERPL-MCNC: 126 MG/DL (ref 30–150)

## 2023-08-16 PROCEDURE — 97165 OT EVAL LOW COMPLEX 30 MIN: CPT

## 2023-08-16 PROCEDURE — 80307 DRUG TEST PRSMV CHEM ANLYZR: CPT | Performed by: HOSPITALIST

## 2023-08-16 PROCEDURE — 25000003 PHARM REV CODE 250: Performed by: HOSPITALIST

## 2023-08-16 PROCEDURE — G0378 HOSPITAL OBSERVATION PER HR: HCPCS

## 2023-08-16 PROCEDURE — 97535 SELF CARE MNGMENT TRAINING: CPT

## 2023-08-16 PROCEDURE — 36415 COLL VENOUS BLD VENIPUNCTURE: CPT | Performed by: HOSPITALIST

## 2023-08-16 PROCEDURE — 99204 PR OFFICE/OUTPT VISIT, NEW, LEVL IV, 45-59 MIN: ICD-10-PCS | Mod: GT,,, | Performed by: PSYCHIATRY & NEUROLOGY

## 2023-08-16 PROCEDURE — 97161 PT EVAL LOW COMPLEX 20 MIN: CPT

## 2023-08-16 PROCEDURE — 92523 SPEECH SOUND LANG COMPREHEN: CPT

## 2023-08-16 PROCEDURE — 99204 OFFICE O/P NEW MOD 45 MIN: CPT | Mod: GT,,, | Performed by: PSYCHIATRY & NEUROLOGY

## 2023-08-16 PROCEDURE — 80061 LIPID PANEL: CPT | Performed by: HOSPITALIST

## 2023-08-16 RX ORDER — CLOPIDOGREL BISULFATE 75 MG/1
75 TABLET ORAL DAILY
Qty: 21 TABLET | Refills: 0 | Status: SHIPPED | OUTPATIENT
Start: 2023-08-17 | End: 2023-08-16 | Stop reason: SDUPTHER

## 2023-08-16 RX ORDER — ASPIRIN 81 MG/1
81 TABLET ORAL DAILY
Qty: 90 TABLET | Refills: 3 | Status: SHIPPED | OUTPATIENT
Start: 2023-08-17 | End: 2024-08-16

## 2023-08-16 RX ORDER — CLOPIDOGREL BISULFATE 75 MG/1
75 TABLET ORAL DAILY
Qty: 90 TABLET | Refills: 3 | Status: SHIPPED | OUTPATIENT
Start: 2023-08-17 | End: 2024-08-11

## 2023-08-16 RX ORDER — INSULIN GLARGINE 100 [IU]/ML
10 INJECTION, SOLUTION SUBCUTANEOUS NIGHTLY
Refills: 0
Start: 2023-08-16

## 2023-08-16 RX ORDER — LOSARTAN POTASSIUM 25 MG/1
100 TABLET ORAL DAILY
Status: DISCONTINUED | OUTPATIENT
Start: 2023-08-16 | End: 2023-08-16 | Stop reason: HOSPADM

## 2023-08-16 RX ORDER — AMLODIPINE BESYLATE 5 MG/1
5 TABLET ORAL DAILY
Status: DISCONTINUED | OUTPATIENT
Start: 2023-08-16 | End: 2023-08-16 | Stop reason: HOSPADM

## 2023-08-16 RX ADMIN — DOLUTEGRAVIR SODIUM AND RILPIVIRINE HYDROCHLORIDE 1 TABLET: 50; 25 TABLET, FILM COATED ORAL at 08:08

## 2023-08-16 RX ADMIN — LOSARTAN POTASSIUM 100 MG: 25 TABLET, FILM COATED ORAL at 11:08

## 2023-08-16 RX ADMIN — INSULIN ASPART 1 UNITS: 100 INJECTION, SOLUTION INTRAVENOUS; SUBCUTANEOUS at 06:08

## 2023-08-16 RX ADMIN — INSULIN ASPART 2 UNITS: 100 INJECTION, SOLUTION INTRAVENOUS; SUBCUTANEOUS at 11:08

## 2023-08-16 RX ADMIN — ASPIRIN 81 MG: 81 TABLET, COATED ORAL at 08:08

## 2023-08-16 RX ADMIN — AMLODIPINE BESYLATE 5 MG: 5 TABLET ORAL at 11:08

## 2023-08-16 RX ADMIN — ATORVASTATIN CALCIUM 40 MG: 40 TABLET, FILM COATED ORAL at 08:08

## 2023-08-16 RX ADMIN — HYDROCHLOROTHIAZIDE 25 MG: 25 TABLET ORAL at 08:08

## 2023-08-16 RX ADMIN — CLOPIDOGREL BISULFATE 75 MG: 75 TABLET ORAL at 08:08

## 2023-08-16 NOTE — HOSPITAL COURSE
Lore Shrestha is a 58 y.o. male with a PMHx of DM, and HTN, who presents to the ED for evaluation of right hand pain onset yesterday around noon. Patient also c/o right leg stiffness, intermittent dry cough, and numbness to the right-side of his upper and lower lips. Patient states he was seen by his PCP this morning and advised to come here. Patient denies shortness of breath, chest pain, fever, chills, abdominal pain, nausea, vomiting, diarrhea, dysuria, headaches, congestion, sore throat, arm or leg trouble, eye pain, ear pain, rash, or other associated symptoms.CT head show,Interval development of small focal hypodensities within the head of the left caudate nucleus and within the right basal ganglia suggestive of small lacunar infarcts, likely subacute to remote in age.   Small remote lacunar infarct involving the right thalamus. No major vascular distribution infarcts are identified.  No evidence for intracranial hemorrhage.  Started patient on ASA.Plavix,statin,and consulted neurology.MRI show no acute CVA,but old CVA as before,carotid dopller and echo. Was unremarkable,did well with PT,OT,ST.  His symptoms improved,likely TIA,patient janki discharged home with statin,ASA,Plavix,out patient follow up with neurology and PCP .

## 2023-08-16 NOTE — PLAN OF CARE
08/16/23 1103   Final Note   Assessment Type Final Discharge Note   Anticipated Discharge Disposition Home   Hospital Resources/Appts/Education Provided Community resources provided   Post-Acute Status   Post-Acute Authorization Other   Other Status No Post-Acute Service Needs     Pts nurse Christina notified that the pt can d/c from CM standpoint

## 2023-08-16 NOTE — PT/OT/SLP EVAL
Occupational Therapy   Evaluation and Discharge Note    Name: Lore Shrestha  MRN: [id number]  Admitting Diagnosis: CVA (cerebral vascular accident)  Recent Surgery: * No surgery found *      Recommendations:     Discharge Recommendations: home  Discharge Equipment Recommendations: none  Barriers to discharge:  None    Assessment:     Lore Shrestha is a 58 y.o. male with a medical diagnosis of CVA (cerebral vascular accident). At this time, patient is functioning at their prior level of function and does not require further acute OT services.     Plan:     During this hospitalization, patient does not require further acute OT services.  Please re-consult if situation changes.    Plan of Care Reviewed with: patient    Subjective     Chief Complaint: none reported   Patient/Family Comments/goals: feels back to normal with no complaints; jaw doesn't hurt/not numb anymore    Occupational Profile:  Living Environment: pt lives in an apartment on the third floor with elevator (constantly broken) and B/L HR for stairs.   Previous level of function: independent   Roles and Routines: recently stopped driving- will walk or take public transportation; works nights stocking shelves/heavy lifting   Equipment Used at home: none  Assistance upon Discharge: 3 sisters and 2 friends local     Pain/Comfort:  Pain Rating 1: 0/10    Patients cultural, spiritual, Yarsani conflicts given the current situation: no    Objective:     Patient found HOB elevated with peripheral IV, telemetry upon OT entry to room.    General Precautions: Standard, fall, diabetic  Orthopedic Precautions: N/A  Braces: N/A  Respiratory Status: Room air     Occupational Performance:    Bed Mobility:    Patient completed Scooting with independence  Patient completed Supine to Sit with independence    Functional Mobility/Transfers:  Patient completed Sit <> Stand Transfer with independence  with  no assistive device   Functional Mobility: Gait belt donned prior to  transfer for safety during mobility/transfers. Pt completed ~150 ft independently.     Activities of Daily Living:  Upper Body Dressing: independence to don back gown while standing  Lower Body Dressing: independence to doff/don B/L socks seated     Cognitive/Visual Perceptual:  Cognitive/Psychosocial Skills:     -       Follows Commands/attention:Follows multistep  commands  -       Communication: clear/fluent  -       Memory: No Deficits noted  -       Safety awareness/insight to disability: intact   -       Mood/Affect/Coping skills/emotional control: Cooperative and Pleasant  Visual/Perceptual:      -Intact  R/L discrimination      Physical Exam:  Balance:    -       seated: good; standing: good   Postural examination/scapula alignment:    -       No postural abnormalities identified  Skin integrity: Visible skin intact and R great toe nail- hard, dry, some discoloration, no drainage  Edema:  None noted  Sensation:    -       Intact  light/touch BUE  Dominant hand:    -       Right  Upper Extremity Range of Motion:     -       Right Upper Extremity: WFL  -       Left Upper Extremity: WFL  Upper Extremity Strength:    -       Right Upper Extremity: 5/5  -       Left Upper Extremity: 5/5   Strength:    -       Right Upper Extremity: WFL  -       Left Upper Extremity: WFL  Fine Motor Coordination:    -       Intact  Left hand, finger to nose, Right hand, finger to nose, Left hand thumb/finger opposition skills, Right hand thumb/finger opposition skills, Left hand, manipulation of objects, and Right hand, manipulation of objects  Gross motor coordination:   WFL    AMPAC 6 Click ADL:  AMPAC Total Score: 24    Treatment & Education:  Pt educated on OT role    Multiple self-care tasks/functional mobility completed- assistance level noted above   All questions/concerns answered within OT scope of practice       Patient left up in chair with all lines intact, call button in reach, and staff member rounding with pt  virtually     GOALS:   Multidisciplinary Problems       Occupational Therapy Goals          Problem: Occupational Therapy    Goal Priority Disciplines Outcome Interventions   Occupational Therapy Goal     OT, PT/OT Adequate for Care Transition                        History:     Past Medical History:   Diagnosis Date    Diabetes mellitus     HIV (human immunodeficiency virus infection)     Hypertension          Past Surgical History:   Procedure Laterality Date    NO PAST SURGERIES  03/11/2019       Time Tracking:     OT Date of Treatment: 08/16/23  OT Start Time: 0927  OT Stop Time: 0939  OT Total Time (min): 12 min    Billable Minutes:Evaluation 12 min (co-eval with PT)    8/16/2023

## 2023-08-16 NOTE — CONSULTS
"TELE-NEUROLOGY CONSULT       Patient Location: W411/W411 A     START TIME: 9:30  END TIME: 9:45        Name: Lore Shrestha  MRN: [id number]   [facility]: [id number]      Date: 08/16/2023    Chief Complaint: R facial paresthesia     History of Present Illness (HPI):  Patient is a 58-year-old male with past medical history diabetes hypertension who presents to the ER due to right hand pain and right leg stiffness and numbness to right part of his face. CT head shows Interval development of small focal hypodensities within the head of the left caudate nucleus and within the right basal ganglia suggestive of small lacunar infarcts, likely subacute to remote in age. MRI brain is neg for acute ischemia. MRA suggestive of R P2 stenosis. BP upon admission was 199/101. LDL is 190. Today patient is back to baseline. He states that he takes aspirin on a daily basis for years. Denies any focal deficits. R facial numbness has resolved.     ROS: as above    Past Medical History: The patient  has a past medical history of Diabetes mellitus, HIV (human immunodeficiency virus infection), and Hypertension.    Social History: The patient  reports that he has never smoked. He has never used smokeless tobacco. He reports that he does not drink alcohol and does not use drugs.    Family History: Their family history is not on file.    Allergies: Patient has no known allergies.     Meds: Scheduled Meds:   aspirin  81 mg Oral Daily    atorvastatin  40 mg Oral Daily    clopidogreL  75 mg Oral Daily    dolutegravir-rilpivirine 50-25 mg  1 tablet Oral Daily    enoxparin  40 mg Subcutaneous Q24H (prophylaxis, 1700)    hydroCHLOROthiazide  25 mg Oral Daily     Continuous Infusions:  PRN Meds:.dextrose 50%, glucagon (human recombinant), hydrALAZINE, insulin aspart U-100    Exam:  /75 (Patient Position: Lying)   Pulse 69   Temp 97.7 °F (36.5 °C) (Oral)   Resp 18   Ht 5' 11" (1.803 m)   Wt 85.2 kg (187 lb 13.3 oz)   SpO2 100%   BMI 26.20 kg/m² "       Constitutional  Well-developed, well-nourished, appears stated age   Eyes  No scleral icterus       Cardiovascular  No lower extremity edema    Respiratory  No labored breathing    Skin  No rash    Hematologic  No bruising   Psychiatric  No depression   Other  GI/ deferred    Neurological     * Mental status  Alert and oriented to person, place, time, and situation; no dysarthria; no aphasia; normal recent and remote memory; follows commands   * Cranial nerves   EOMI, no facial weakness                                   * Motor  Lifts all 4 ext up equally, no drift        * Coordination  No dysmetria or tremor with outstretched hands    * Gait  Deferred          Laboratory/Radiological:   Admission on 08/15/2023   Component Date Value Ref Range Status    POCT Glucose 08/15/2023 127 (H)  70 - 110 mg/dL Final    Magnesium 08/15/2023 2.3  1.6 - 2.6 mg/dL Final    Sodium 08/15/2023 138  136 - 145 mmol/L Final    Potassium 08/15/2023 4.0  3.5 - 5.1 mmol/L Final    Chloride 08/15/2023 105  95 - 110 mmol/L Final    CO2 08/15/2023 25  23 - 29 mmol/L Final    Glucose 08/15/2023 137 (H)  70 - 110 mg/dL Final    BUN 08/15/2023 12  6 - 20 mg/dL Final    Creatinine 08/15/2023 1.3  0.5 - 1.4 mg/dL Final    Calcium 08/15/2023 9.5  8.7 - 10.5 mg/dL Final    Total Protein 08/15/2023 8.0  6.0 - 8.4 g/dL Final    Albumin 08/15/2023 4.2  3.5 - 5.2 g/dL Final    Total Bilirubin 08/15/2023 0.3  0.1 - 1.0 mg/dL Final    Alkaline Phosphatase 08/15/2023 97  55 - 135 U/L Final    AST 08/15/2023 22  10 - 40 U/L Final    ALT 08/15/2023 19  10 - 44 U/L Final    eGFR 08/15/2023 >60  >60 mL/min/1.73 m^2 Final    Anion Gap 08/15/2023 8  8 - 16 mmol/L Final    WBC 08/15/2023 8.07  3.90 - 12.70 K/uL Final    RBC 08/15/2023 4.70  4.60 - 6.20 M/uL Final    Hemoglobin 08/15/2023 13.0 (L)  14.0 - 18.0 g/dL Final    Hematocrit 08/15/2023 39.8 (L)  40.0 - 54.0 % Final    MCV 08/15/2023 85  82 - 98 fL Final    MCH 08/15/2023 27.7  27.0 - 31.0 pg  Final    MCHC 08/15/2023 32.7  32.0 - 36.0 g/dL Final    RDW 08/15/2023 12.3  11.5 - 14.5 % Final    Platelets 08/15/2023 316  150 - 450 K/uL Final    MPV 08/15/2023 9.6  9.2 - 12.9 fL Final    Immature Granulocytes 08/15/2023 0.2  0.0 - 0.5 % Final    Gran # (ANC) 08/15/2023 4.5  1.8 - 7.7 K/uL Final    Immature Grans (Abs) 08/15/2023 0.02  0.00 - 0.04 K/uL Final    Lymph # 08/15/2023 2.8  1.0 - 4.8 K/uL Final    Mono # 08/15/2023 0.5  0.3 - 1.0 K/uL Final    Eos # 08/15/2023 0.1  0.0 - 0.5 K/uL Final    Baso # 08/15/2023 0.05  0.00 - 0.20 K/uL Final    nRBC 08/15/2023 0  0 /100 WBC Final    Gran % 08/15/2023 55.9  38.0 - 73.0 % Final    Lymph % 08/15/2023 35.1  18.0 - 48.0 % Final    Mono % 08/15/2023 6.7  4.0 - 15.0 % Final    Eosinophil % 08/15/2023 1.5  0.0 - 8.0 % Final    Basophil % 08/15/2023 0.6  0.0 - 1.9 % Final    Differential Method 08/15/2023 Automated   Final    Prothrombin Time 08/15/2023 10.5  9.0 - 12.5 sec Final    INR 08/15/2023 1.0  0.8 - 1.2 Final    aPTT 08/15/2023 25.4  21.0 - 32.0 sec Final    BSA 08/15/2023 2.07  m2 Final    Rose's Biplane MOD Ejection Fra* 08/15/2023 5  % Final    LVOT stroke volume 08/15/2023 41.62  cm3 Final    LVIDd 08/15/2023 4.64  3.5 - 6.0 cm Final    LV Systolic Volume 08/15/2023 41.94  mL Final    LV Systolic Volume Index 08/15/2023 20.5  mL/m2 Final    LVIDs 08/15/2023 3.23  2.1 - 4.0 cm Final    LV Diastolic Volume 08/15/2023 99.16  mL Final    LV Diastolic Volume Index 08/15/2023 48.37  mL/m2 Final    IVS 08/15/2023 0.98  0.6 - 1.1 cm Final    LVOT diameter 08/15/2023 1.88  cm Final    LVOT area 08/15/2023 2.8  cm2 Final    FS 08/15/2023 30  28 - 44 % Final    Left Ventricle Relative Wall Thick* 08/15/2023 0.47  cm Final    Posterior Wall 08/15/2023 1.10  0.6 - 1.1 cm Final    LV mass 08/15/2023 169.97  g Final    LV Mass Index 08/15/2023 83  g/m2 Final    MV Peak E Arnoldo 08/15/2023 0.67  m/s Final    TDI LATERAL 08/15/2023 0.13  m/s Final    TDI SEPTAL  08/15/2023 0.08  m/s Final    E/E' ratio 08/15/2023 6.38  m/s Final    MV Peak A Arnoldo 08/15/2023 0.71  m/s Final    TR Max Arnoldo 08/15/2023 1.96  m/s Final    E/A ratio 08/15/2023 0.94   Final    E wave deceleration time 08/15/2023 182.59  msec Final    LV SEPTAL E/E' RATIO 08/15/2023 8.38  m/s Final    LV LATERAL E/E' RATIO 08/15/2023 5.15  m/s Final    PV Peak S Arnoldo 08/15/2023 0.45  m/s Final    PV Peak D Arnoldo 08/15/2023 0.26  m/s Final    Pulm vein S/D ratio 08/15/2023 1.73   Final    LVOT peak arnoldo 08/15/2023 0.66  m/s Final    Left Ventricular Outflow Tract Kathe* 08/15/2023 0.43  cm/s Final    Left Ventricular Outflow Tract Kathe* 08/15/2023 0.88  mmHg Final    LA size 08/15/2023 3.85  cm Final    Left Atrium Major Axis 08/15/2023 5.70  cm Final    Left Atrium Minor Axis 08/15/2023 4.97  cm Final    RVDD 08/15/2023 3.41  cm Final    RV S' 08/15/2023 12.39  cm/s Final    TAPSE 08/15/2023 2.36  cm Final    RA Major Axis 08/15/2023 4.78  cm Final    AV mean gradient 08/15/2023 2  mmHg Final    AV peak gradient 08/15/2023 4  mmHg Final    Ao peak arnoldo 08/15/2023 0.98  m/s Final    Ao VTI 08/15/2023 24.40  cm Final    LVOT peak VTI 08/15/2023 15.00  cm Final    AV valve area 08/15/2023 1.71  cm² Final    AV Velocity Ratio 08/15/2023 0.67   Final    AV index (prosthetic) 08/15/2023 0.61   Final    BRANT by Velocity Ratio 08/15/2023 1.87  cm² Final    MV mean gradient 08/15/2023 1  mmHg Final    MV peak gradient 08/15/2023 3  mmHg Final    MV stenosis pressure 1/2 time 08/15/2023 52.95  ms Final    MV valve area p 1/2 method 08/15/2023 4.15  cm2 Final    MV valve area by continuity eq 08/15/2023 1.48  cm2 Final    MV VTI 08/15/2023 28.2  cm Final    Triscuspid Valve Regurgitation Pea* 08/15/2023 15  mmHg Final    PV PEAK VELOCITY 08/15/2023 0.71  m/s Final    PV peak gradient 08/15/2023 2  mmHg Final    Ao root annulus 08/15/2023 2.35  cm Final    Sinus 08/15/2023 2.79  cm Final    STJ 08/15/2023 2.43  cm Final    Ascending  aorta 08/15/2023 2.77  cm Final    IVC diameter 08/15/2023 1.77  cm Final    Mean e' 08/15/2023 0.11  m/s Final    ZLVIDS 08/15/2023 -1.22   Final    ZLVIDD 08/15/2023 -2.82   Final    AORTIC VALVE CUSP SEPERATION 08/15/2023 1.87  cm Final    LA Volume Index 08/15/2023 33.1  mL/m2 Final    LA volume 08/15/2023 67.77  cm3 Final    LA WIDTH 08/15/2023 3.9  cm Final    RA Width 08/15/2023 4.0  cm Final    TV resting pulmonary artery pressu* 08/15/2023 23  mmHg Final    RV TB RVSP 08/15/2023 10  mmHg Final    Est. RA pres 08/15/2023 8  mmHg Final    Benzodiazepines 08/16/2023 Negative  Negative Final    Methadone metabolites 08/16/2023 Negative  Negative Final    Cocaine (Metab.) 08/16/2023 Negative  Negative Final    Opiate Scrn, Ur 08/16/2023 Negative  Negative Final    Barbiturate Screen, Ur 08/16/2023 Negative  Negative Final    Amphetamine Screen, Ur 08/16/2023 Negative  Negative Final    THC 08/16/2023 Negative  Negative Final    Phencyclidine 08/16/2023 Negative  Negative Final    Creatinine, Urine 08/16/2023 173.8  23.0 - 375.0 mg/dL Final    Toxicology Information 08/16/2023 SEE COMMENT   Final    POCT Glucose 08/15/2023 169 (H)  70 - 110 mg/dL Final    POCT Glucose 08/15/2023 191 (H)  70 - 110 mg/dL Final    Cholesterol 08/16/2023 249 (H)  120 - 199 mg/dL Final    Triglycerides 08/16/2023 126  30 - 150 mg/dL Final    HDL 08/16/2023 33 (L)  40 - 75 mg/dL Final    LDL Cholesterol 08/16/2023 190.8 (H)  63.0 - 159.0 mg/dL Final    HDL/Cholesterol Ratio 08/16/2023 13.3 (L)  20.0 - 50.0 % Final    Total Cholesterol/HDL Ratio 08/16/2023 7.5 (H)  2.0 - 5.0 Final    Non-HDL Cholesterol 08/16/2023 216  mg/dL Final    POCT Glucose 08/16/2023 142 (H)  70 - 110 mg/dL Final    POCT Glucose 08/16/2023 158 (H)  70 - 110 mg/dL Final    POCT Glucose 08/16/2023 114 (H)  70 - 110 mg/dL Final   Admission on 08/08/2023, Discharged on 08/08/2023   Component Date Value Ref Range Status    POCT Glucose 08/08/2023 56 (L)  70 - 110  mg/dL Final    POCT Glucose 08/08/2023 59 (L)  70 - 110 mg/dL Final    Sodium 08/08/2023 140  136 - 145 mmol/L Final    Potassium 08/08/2023 3.3 (L)  3.5 - 5.1 mmol/L Final    Chloride 08/08/2023 106  95 - 110 mmol/L Final    CO2 08/08/2023 26  23 - 29 mmol/L Final    Glucose 08/08/2023 168 (H)  70 - 110 mg/dL Final    BUN 08/08/2023 10  6 - 20 mg/dL Final    Creatinine 08/08/2023 1.2  0.5 - 1.4 mg/dL Final    Calcium 08/08/2023 9.3  8.7 - 10.5 mg/dL Final    Total Protein 08/08/2023 7.2  6.0 - 8.4 g/dL Final    Albumin 08/08/2023 4.1  3.5 - 5.2 g/dL Final    Total Bilirubin 08/08/2023 0.2  0.1 - 1.0 mg/dL Final    Alkaline Phosphatase 08/08/2023 91  55 - 135 U/L Final    AST 08/08/2023 21  10 - 40 U/L Final    ALT 08/08/2023 20  10 - 44 U/L Final    eGFR 08/08/2023 >60  >60 mL/min/1.73 m^2 Final    Anion Gap 08/08/2023 8  8 - 16 mmol/L Final    WBC 08/08/2023 10.48  3.90 - 12.70 K/uL Final    RBC 08/08/2023 4.38 (L)  4.60 - 6.20 M/uL Final    Hemoglobin 08/08/2023 12.2 (L)  14.0 - 18.0 g/dL Final    Hematocrit 08/08/2023 37.8 (L)  40.0 - 54.0 % Final    MCV 08/08/2023 86  82 - 98 fL Final    MCH 08/08/2023 27.9  27.0 - 31.0 pg Final    MCHC 08/08/2023 32.3  32.0 - 36.0 g/dL Final    RDW 08/08/2023 12.6  11.5 - 14.5 % Final    Platelets 08/08/2023 254  150 - 450 K/uL Final    MPV 08/08/2023 9.6  9.2 - 12.9 fL Final    Immature Granulocytes 08/08/2023 0.2  0.0 - 0.5 % Final    Gran # (ANC) 08/08/2023 8.3 (H)  1.8 - 7.7 K/uL Final    Immature Grans (Abs) 08/08/2023 0.02  0.00 - 0.04 K/uL Final    Lymph # 08/08/2023 1.6  1.0 - 4.8 K/uL Final    Mono # 08/08/2023 0.5  0.3 - 1.0 K/uL Final    Eos # 08/08/2023 0.0  0.0 - 0.5 K/uL Final    Baso # 08/08/2023 0.04  0.00 - 0.20 K/uL Final    nRBC 08/08/2023 0  0 /100 WBC Final    Gran % 08/08/2023 78.8 (H)  38.0 - 73.0 % Final    Lymph % 08/08/2023 15.3 (L)  18.0 - 48.0 % Final    Mono % 08/08/2023 4.9  4.0 - 15.0 % Final    Eosinophil % 08/08/2023 0.4  0.0 - 8.0 % Final     Basophil % 2023 0.4  0.0 - 1.9 % Final    Differential Method 2023 Automated   Final    Troponin I 2023 0.013  0.000 - 0.026 ng/mL Final    POCT Glucose 2023 114 (H)  70 - 110 mg/dL Final    Troponin I 2023 0.010  0.000 - 0.026 ng/mL Final    POCT Glucose 2023 84  70 - 110 mg/dL Final       Diagnoses:   1) R sided facial paresthesia in setting of malignant blood pressures- TIA vs hypertensive urgency- MRI is negative for acute stroke  2) HTN  3) DM  4) HLD      Medical Decision Makin) Plavix 75 mg daily (does not need DAPT given low ABCD2 score)  2) Lipitor 80 mg  3) Vascular risk factor optimization  4) f/u with neurology as outpatient  5) ECHO with contrast and bubble study  6) A1C serum        Thank you for this consult. Please do not hesitate to contact us with questions. Please re-consult neurology if any issues.    This is a consult performed through audio-visual using [employer] mae. Pt and provider are in different locations. History and physical exam are limited due to the nature of this encounter.       Dr. Annette Bermeo  Tele-Neurology- [facility]

## 2023-08-16 NOTE — NURSING
Discharge instructions given. Pt verbalized understanding of discharge instructions. IV removed from left AC, intact. Patient home med returned to patient. Pt waiting on RX to be delivered from pharmacy.

## 2023-08-16 NOTE — PLAN OF CARE
Problem: Physical Therapy  Goal: Physical Therapy Goal  Outcome: Met     Pt independent with functional mobility and ambulation, no further therapy needs at this time.

## 2023-08-16 NOTE — NURSING
[facility], [facility]  Nurses Note -- 4 Eyes  Pt lying in moderate high back rest. No Numbness, Tingling on R cheek and lips   , bed in low position, wheels locked. Side rails up. Call light within reach.    8/15/2023       Skin assessed on: Q Shift      [x] No Pressure Injuries Present    [x]Prevention Measures Documented    [] Yes LDA  for Pressure Injury Previously documented     [] Yes New Pressure Injury Discovered   [] LDA for New Pressure Injury Added      Attending RN:  Svetlana Ni, LEANDRO     Second RN:  Christina Ndiaye, RN

## 2023-08-16 NOTE — DISCHARGE SUMMARY
[facility]  Discharge Summary      Patient Name: Lore Shrestha  MRN: [id number]  [facility]: [id number]  Patient Class: OP- Observation  Admission Date: 8/15/2023  Hospital Length of Stay: 1 day   Discharge Date and Time:  08/16/2023 11:16 AM  Attending Physician: Patricia Boyle, *   Discharging Provider: Patricia Boyle MD  Primary Care Provider: [facility]    Primary Care Team: Networked reference to record PCT     HPI:   Lore Shrestha is a 58 y.o. male with a PMHx of DM, and HTN, who presents to the ED for evaluation of right hand pain onset yesterday around noon. Patient also c/o right leg stiffness, intermittent dry cough, and numbness to the right-side of his upper and lower lips. Patient states he was seen by his PCP this morning and advised to come here. Patient denies shortness of breath, chest pain, fever, chills, abdominal pain, nausea, vomiting, diarrhea, dysuria, headaches, congestion, sore throat, arm or leg trouble, eye pain, ear pain, rash, or other associated symptoms.CT head show,Interval development of small focal hypodensities within the head of the left caudate nucleus and within the right basal ganglia suggestive of small lacunar infarcts, likely subacute to remote in age.   Small remote lacunar infarct involving the right thalamus. No major vascular distribution infarcts are identified.  No evidence for intracranial hemorrhage.  Started patient on ASA.Plavix,statin,and consulted neurology.      * No surgery found *      Hospital Course:   Lore Shrestha is a 58 y.o. male with a PMHx of DM, and HTN, who presents to the ED for evaluation of right hand pain onset yesterday around noon. Patient also c/o right leg stiffness, intermittent dry cough, and numbness to the right-side of his upper and lower lips. Patient states he was seen by his PCP this morning and advised to come here. Patient denies shortness of breath, chest pain, fever,  chills, abdominal pain, nausea, vomiting, diarrhea, dysuria, headaches, congestion, sore throat, arm or leg trouble, eye pain, ear pain, rash, or other associated symptoms.CT head show,Interval development of small focal hypodensities within the head of the left caudate nucleus and within the right basal ganglia suggestive of small lacunar infarcts, likely subacute to remote in age.   Small remote lacunar infarct involving the right thalamus. No major vascular distribution infarcts are identified.  No evidence for intracranial hemorrhage.  Started patient on ASA.Plavix,statin,and consulted neurology.MRI show no acute CVA,but old CVA as before,carotid dopller and echo. Was unremarkable,did well with PT,OT,ST.  His symptoms improved,likely TIA,patient janki discharged home with statin,ASA,Plavix,out patient follow up with neurology and PCP .       Goals of Care Treatment Preferences:  Code Status: Full Code      Consults:   Consults (From admission, onward)        Status Ordering Provider     Inpatient consult to Social Work  Once        Provider:  (Not yet assigned)    Completed CONG RAMIREZ     Inpatient consult to neurology  Once        Provider:  Annette Bermeo MD    Completed ZONIA JOSHI          No new Assessment & Plan notes have been filed under this hospital service since the last note was generated.  Service: Hospital Medicine    Final Active Diagnoses:    Diagnosis Date Noted POA    PRINCIPAL PROBLEM:  CVA (cerebral vascular accident) [I63.9] 03/11/2019 Yes    DM (diabetes mellitus), type 2 [E11.9] 08/15/2023 Yes    Hyperlipidemia [E78.5] 03/11/2019 Yes    Essential hypertension [I10] 04/09/2013 Yes    HIV (human immunodeficiency virus infection) [B20] 04/09/2013 Yes      Problems Resolved During this Admission:       Discharged Condition: stable    Disposition: Home or Self Care    Follow Up:   Follow-up Information     Center[facility] Follow up in 1  week(s).    Specialties: Internal Medicine, Family Medicine  Contact information:  [address]N  [facility] [id number]  [phone]                       Patient Instructions:      Ambulatory referral/consult to Neurology   Standing Status: Future   Referral Priority: Routine Referral Type: Consultation   Referral Reason: Specialty Services Required   Requested Specialty: Neurology   Number of Visits Requested: 1     Ambulatory referral/consult to Physical/Occupational Therapy   Standing Status: Future   Referral Priority: Routine Referral Type: Physical Medicine   Referral Reason: Specialty Services Required   Number of Visits Requested: 1     Activity as tolerated       Significant Diagnostic Studies: Labs:   BMP:   Recent Labs   Lab 08/15/23  1103   *      K 4.0      CO2 25   BUN 12   CREATININE 1.3   CALCIUM 9.5   MG 2.3   , CMP   Recent Labs   Lab 08/15/23  1103      K 4.0      CO2 25   *   BUN 12   CREATININE 1.3   CALCIUM 9.5   PROT 8.0   ALBUMIN 4.2   BILITOT 0.3   ALKPHOS 97   AST 22   ALT 19   ANIONGAP 8   , CBC   Recent Labs   Lab 08/15/23  1103   WBC 8.07   HGB 13.0*   HCT 39.8*       and Lipid Panel   Lab Results   Component Value Date    CHOL 249 (H) 08/16/2023    HDL 33 (L) 08/16/2023    LDLCALC 190.8 (H) 08/16/2023    TRIG 126 08/16/2023    CHOLHDL 13.3 (L) 08/16/2023     Radiology: MRI: brain  CT scan: head   Ultrasound: neck   Cardiac Graphics: Echocardiogram:   2D echo with color flow doppler:   Results for orders placed or performed during the hospital encounter of 04/08/13   2D echo with color flow doppler   Result Value Ref Range    EF + QEF 55     Mitral Valve Regurgitation trivial     Narrative    TEST DESCRIPTION       The aortic root is normal in size, measuring 2.2 cm at sinotubular junction and 2.6 cm at Sinuses of Valsalva. The proximal ascending aorta is normal in size, measuring 2.5 cm across.     The left atrial volume index is  moderately enlarged, measuring 38.18 cc/m2. The left ventricle is normal in size, with an end-diastolic diameter of 4.6 cm, and an end-systolic diameter of 2.8 cm. Wall thickness is mildly increased, with the septum   measuring 1.2 cm and the posterior wall measuring 1.1 cm across. Relative wall thickness was increased at 0.48, and the LV mass index was 109.6 g/m2 consistent with concentric remodeling.   Regional and global left ventricular systolic function appears normal with a visually estimated ejection fraction of 55%.     The right atrium is normal in size, measuring 5.0 cm in length and 3.3 cm in width in the apical view. The right ventricle is upper limit of normal measuring 3.4 cm at the base in the apical right ventricle-focused view. Global right ventricular systolic   function appears normal.     The aortic valve is mildly sclerotic with normal leaflet mobility.     The mitral valve is normal in structure with normal leaflet mobility. There is trivial mitral regurgitation.     The tricuspid valve is normal in structure with normal leaflet mobility. There is trivial tricuspid regurgitation.     The pulmonic valve is not well seen.     There is no evidence of pericardial effusion, intracavity mass, thrombi, or vegetation.         CONCLUSIONS     1 - Left ventricular hypertrophy.     2 - Normal left ventricular function (EF 55%).     3 - Right ventricle is upper limit of normal in size with normal systolic function.         This document has been electronically    SIGNED BY: Reid Nicholas M.D. On: 04/09/2013 15:11    and Transthoracic echo (TTE) complete (Cupid Only):   Results for orders placed or performed during the hospital encounter of 08/15/23   Echo   Result Value Ref Range    BSA 2.07 m2    Rose's Biplane MOD Ejection Fraction 5 %    LVOT stroke volume 41.62 cm3    LVIDd 4.64 3.5 - 6.0 cm    LV Systolic Volume 41.94 mL    LV Systolic Volume Index 20.5 mL/m2    LVIDs 3.23 2.1 - 4.0 cm    LV  Diastolic Volume 99.16 mL    LV Diastolic Volume Index 48.37 mL/m2    IVS 0.98 0.6 - 1.1 cm    LVOT diameter 1.88 cm    LVOT area 2.8 cm2    FS 30 28 - 44 %    Left Ventricle Relative Wall Thickness 0.47 cm    Posterior Wall 1.10 0.6 - 1.1 cm    LV mass 169.97 g    LV Mass Index 83 g/m2    MV Peak E Arnoldo 0.67 m/s    TDI LATERAL 0.13 m/s    TDI SEPTAL 0.08 m/s    E/E' ratio 6.38 m/s    MV Peak A Arnoldo 0.71 m/s    TR Max Arnoldo 1.96 m/s    E/A ratio 0.94     E wave deceleration time 182.59 msec    LV SEPTAL E/E' RATIO 8.38 m/s    LV LATERAL E/E' RATIO 5.15 m/s    PV Peak S Arnoldo 0.45 m/s    PV Peak D Arnoldo 0.26 m/s    Pulm vein S/D ratio 1.73     LVOT peak arnoldo 0.66 m/s    Left Ventricular Outflow Tract Mean Velocity 0.43 cm/s    Left Ventricular Outflow Tract Mean Gradient 0.88 mmHg    LA size 3.85 cm    Left Atrium Major Axis 5.70 cm    Left Atrium Minor Axis 4.97 cm    RVDD 3.41 cm    RV S' 12.39 cm/s    TAPSE 2.36 cm    RA Major Axis 4.78 cm    AV mean gradient 2 mmHg    AV peak gradient 4 mmHg    Ao peak arnoldo 0.98 m/s    Ao VTI 24.40 cm    LVOT peak VTI 15.00 cm    AV valve area 1.71 cm²    AV Velocity Ratio 0.67     AV index (prosthetic) 0.61     BRANT by Velocity Ratio 1.87 cm²    MV mean gradient 1 mmHg    MV peak gradient 3 mmHg    MV stenosis pressure 1/2 time 52.95 ms    MV valve area p 1/2 method 4.15 cm2    MV valve area by continuity eq 1.48 cm2    MV VTI 28.2 cm    Triscuspid Valve Regurgitation Peak Gradient 15 mmHg    PV PEAK VELOCITY 0.71 m/s    PV peak gradient 2 mmHg    Ao root annulus 2.35 cm    Sinus 2.79 cm    STJ 2.43 cm    Ascending aorta 2.77 cm    IVC diameter 1.77 cm    Mean e' 0.11 m/s    ZLVIDS -1.22     ZLVIDD -2.82     AORTIC VALVE CUSP SEPERATION 1.87 cm    LA Volume Index 33.1 mL/m2    LA volume 67.77 cm3    LA WIDTH 3.9 cm    RA Width 4.0 cm    TV resting pulmonary artery pressure 23 mmHg    RV TB RVSP 10 mmHg    Est. RA pres 8 mmHg    Narrative      Left Ventricle: The left ventricle is normal  in size. Mildly increased   wall thickness. There is concentric remodeling. Mild global hypokinesis   present. There is mildly reduced systolic function with a visually   estimated ejection fraction of 45 - 50%. There is normal diastolic   function.    Right Ventricle: Normal right ventricular cavity size. Wall thickness   is normal. Right ventricle wall motion  is normal. Systolic function is   normal.    Pulmonary Artery: The estimated pulmonary artery systolic pressure is   23 mmHg.         Pending Diagnostic Studies:     None         Medications:  Reconciled Home Medications:      Medication List      START taking these medications    aspirin 81 MG EC tablet  Commonly known as: ECOTRIN  Take 1 tablet (81 mg total) by mouth once daily.  Start taking on: August 17, 2023     clopidogreL 75 mg tablet  Commonly known as: PLAVIX  Take 1 tablet (75 mg total) by mouth once daily.  Start taking on: August 17, 2023        CHANGE how you take these medications    LANTUS SOLOSTAR U-100 INSULIN glargine 100 units/mL SubQ pen  Generic drug: insulin  Inject 10 Units into the skin every evening.  What changed:   · how much to take  · how to take this  · when to take this        CONTINUE taking these medications    amitriptyline 25 MG tablet  Commonly known as: ELAVIL  Take 25 mg by mouth nightly as needed.     amLODIPine 5 MG tablet  Commonly known as: NORVASC  Take 5 mg by mouth.     atorvastatin 80 MG tablet  Commonly known as: LIPITOR  Take 80 mg by mouth once daily.     dolutegravir-rilpivirine 50-25 mg 50-25 mg Tab  Commonly known as: JULUCA  Take 1 tablet by mouth once daily.     FLUoxetine 40 MG capsule  Take 40 mg by mouth once daily.     gabapentin 300 MG capsule  Commonly known as: NEURONTIN  Take 300 mg by mouth 3 (three) times daily.     hydroCHLOROthiazide 25 MG tablet  Commonly known as: HYDRODIURIL  Take 1 tablet (25 mg total) by mouth 2 (two) times daily.     losartan 100 MG tablet  Commonly known as:  COZAAR  Take 100 mg by mouth once daily.            Indwelling Lines/Drains at time of discharge:   Lines/Drains/Airways     None                 Time spent on the discharge of patient: less than 30  minutes         Patricia Boyle MD  Department of Hospital Medicine  [facility]

## 2023-08-16 NOTE — PT/OT/SLP EVAL
Speech Language Pathology Evaluation  Cognitive Communication    Patient Name:  Lore Shrestha   MRN:  [id number]  Admitting Diagnosis: CVA (cerebral vascular accident)    Recommendations:     Recommendations:                General Recommendations:  Follow-up not indicated  Diet recommendations:  Regular, Thin   Aspiration Precautions: 1 bite/sip at a time   General Precautions: Standard,    Communication strategies:  none    Assessment:     Lore Shrestha is a 58 y.o. male with cva he presents with cognitive linguistic skills at reported baseline.     History:     Past Medical History:   Diagnosis Date    Diabetes mellitus     HIV (human immunodeficiency virus infection)     Hypertension        Past Surgical History:   Procedure Laterality Date    NO PAST SURGERIES  03/11/2019       Social History: Patient lives alone    Occupation/hobbies/homemaking:  at [employer]    Subjective   Pt reporting he was unsure if he would know cognitive linguistic skills are at baseline or not. Speech at level of conversation was organized, specific and pleasant.   Patient goals: return to baseline    Pain/Comfort:  Pain Rating 1: 0/10  Pain Rating Post-Intervention 1: 0/10    Respiratory Status: Room air    Objective:     Cognitive Status:    wfl     Receptive Language:   Comprehension:   wfl    Pragmatics:    wfl    Expressive Language:  Verbal:    wfl      Motor Speech:  wfl    Voice:   wfl    Visual-Spatial:  wfl    Treatment: Pt educated regarding recommendation for discharge from ST services.     Goals:   Multidisciplinary Problems       SLP Goals       Not on file              Multidisciplinary Problems (Resolved)          Problem: SLP    Goal Priority Disciplines Outcome   SLP Goal   (Resolved)    Low SLP Met   Description: Pt will participate in clinical swallow eval (goal met 8/15)  Pt will participate in cognitive linguistic eval (goal met 8/16/23)                       Plan:     Plan of Care expires:   08/17/23  Plan of Care reviewed with:  patient   SLP Follow-Up:  no     Discharge recommendations:  none  Barriers to Discharge:  None    Time Tracking:     SLP Treatment Date:   08/16/23  Speech Start Time:  1148  Speech Stop Time:  1221     Speech Total Time (min):  33 min    Billable Minutes: Eval 20  and Self Care/Home Management Training 13    08/16/2023

## 2023-08-16 NOTE — PLAN OF CARE
Problem: Occupational Therapy  Goal: Occupational Therapy Goal  Outcome: Adequate for Care Transition     Initial OT eval completed. Pt is at baseline with no further OT needs- independent with ADLs and ~150 ft. Thank you.

## 2023-08-16 NOTE — PT/OT/SLP EVAL
Physical Therapy Evaluation and Discharge Note    Patient Name:  Lore Shrestha   MRN:  [id number]    Recommendations:     Discharge Recommendations: home  Discharge Equipment Recommendations: none   Barriers to discharge: Inaccessible home    Assessment:     Lore Shrestha is a 58 y.o. male admitted with a medical diagnosis of CVA (cerebral vascular accident) (imaging negative for acute CVA).  At this time, patient is functioning at their prior level of function and does not require further acute PT services.     Recent Surgery: * No surgery found *      Plan:     During this hospitalization, patient does not require further acute PT services.  Please re-consult if situation changes.      Subjective     Chief Complaint: numbness to R side of lips  Patient/Family Comments/goals: Pt agreeable to PT eval.   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  Pt will be taking a bus or walk, reported that he had to get rid of his vehicle.  Equipment used at home: none.  Upon discharge, patient will have assistance from 3 sisters and 2 friends.    Objective:     Patient found HOB elevated with peripheral IV, telemetry upon PT entry to room.    General Precautions: Standard, fall, diabetic; Pt has reading glasses.   Orthopedic Precautions:N/A   Braces: N/A  Respiratory Status: Room air    Exams:  Cognitive Exam:  Patient was able to follow multiple commands.    Gross Motor Coordination:  WFL  Postural Exam:  Patient presented with the following abnormalities:    -       No postural abnormalities identified  Sensation:    -       Intact  light/touch BLE  Skin Integrity/Edema:      -       Skin integrity: Visible skin intact and Pt concerned about R great toe nail bed.  It appeared dry and discolored, but no drainage,  edema, or erthythema noted at this time.  Pt has been monitoring his skin.  -       Edema: None noted BLE  BLE ROM: WNL  BLE Strength: WNL 5/5    Functional Mobility:  Bed Mobility:     Scooting: independence  Supine to Sit: modified independence with HOB elevated   Transfers:     Sit to Stand: independence with no AD  Bed to Chair: independence with  no AD  using  Step Transfer  Gait: Pt ambulated ~150 ft independently in the hallway.  Pt with slight increased in B hip ER, otherwise no major gait deviations.  Balance: Pt with fair+ dynamic standing balance.     AM-PAC 6 CLICK MOBILITY  Total Score:24       Patient left up in chair with all lines intact, call button in reach, and tele neurology present.  Tray table close by.     GOALS:   Multidisciplinary Problems       Physical Therapy Goals       Not on file              Multidisciplinary Problems (Resolved)          Problem: Physical Therapy    Goal Priority Disciplines Outcome Goal Variances Interventions   Physical Therapy Goal   (Resolved)     PT, PT/OT Met                         History:     Past Medical History:   Diagnosis Date    Diabetes mellitus     HIV (human immunodeficiency virus infection)     Hypertension        Past Surgical History:   Procedure Laterality Date    NO PAST SURGERIES  03/11/2019       Time Tracking:     PT Received On: 08/16/23  PT Start Time: 0927     PT Stop Time: 0939  PT Total Time (min): 12 min     Billable Minutes: Evaluation 12 min co-eval with OT      08/16/2023

## 2023-08-28 ENCOUNTER — TELEPHONE (OUTPATIENT)
Dept: NEUROLOGY | Facility: CLINIC | Age: 58
End: 2023-08-28

## 2023-10-23 ENCOUNTER — OFFICE VISIT (OUTPATIENT)
Dept: NEUROLOGY | Facility: CLINIC | Age: 58
End: 2023-10-23
Payer: COMMERCIAL

## 2023-10-23 ENCOUNTER — PATIENT MESSAGE (OUTPATIENT)
Dept: NEUROLOGY | Facility: CLINIC | Age: 58
End: 2023-10-23

## 2023-10-23 ENCOUNTER — LAB VISIT (OUTPATIENT)
Dept: LAB | Facility: HOSPITAL | Age: 58
End: 2023-10-23
Payer: COMMERCIAL

## 2023-10-23 VITALS — DIASTOLIC BLOOD PRESSURE: 87 MMHG | SYSTOLIC BLOOD PRESSURE: 137 MMHG | HEART RATE: 81 BPM

## 2023-10-23 DIAGNOSIS — R20.2 NUMBNESS AND TINGLING OF RIGHT ARM AND LEG: ICD-10-CM

## 2023-10-23 DIAGNOSIS — E11.69 TYPE 2 DIABETES MELLITUS WITH OTHER SPECIFIED COMPLICATION, WITH LONG-TERM CURRENT USE OF INSULIN: ICD-10-CM

## 2023-10-23 DIAGNOSIS — E11.69 TYPE 2 DIABETES MELLITUS WITH OTHER SPECIFIED COMPLICATION, WITH LONG-TERM CURRENT USE OF INSULIN: Primary | ICD-10-CM

## 2023-10-23 DIAGNOSIS — B20 HIV INFECTION, UNSPECIFIED SYMPTOM STATUS: ICD-10-CM

## 2023-10-23 DIAGNOSIS — Z79.4 TYPE 2 DIABETES MELLITUS WITH OTHER SPECIFIED COMPLICATION, WITH LONG-TERM CURRENT USE OF INSULIN: ICD-10-CM

## 2023-10-23 DIAGNOSIS — Z79.4 TYPE 2 DIABETES MELLITUS WITH OTHER SPECIFIED COMPLICATION, WITH LONG-TERM CURRENT USE OF INSULIN: Primary | ICD-10-CM

## 2023-10-23 DIAGNOSIS — R20.0 NUMBNESS AND TINGLING OF RIGHT ARM AND LEG: ICD-10-CM

## 2023-10-23 LAB
ESTIMATED AVG GLUCOSE: 301 MG/DL (ref 68–131)
HBA1C MFR BLD: 12.1 % (ref 4–5.6)

## 2023-10-23 PROCEDURE — 86361 T CELL ABSOLUTE COUNT: CPT

## 2023-10-23 PROCEDURE — 83036 HEMOGLOBIN GLYCOSYLATED A1C: CPT

## 2023-10-23 PROCEDURE — 99999 PR PBB SHADOW E&M-EST. PATIENT-LVL III: CPT | Mod: PBBFAC,,,

## 2023-10-23 PROCEDURE — 99999 PR PBB SHADOW E&M-EST. PATIENT-LVL III: ICD-10-PCS | Mod: PBBFAC,,,

## 2023-10-23 PROCEDURE — 36415 COLL VENOUS BLD VENIPUNCTURE: CPT

## 2023-10-23 NOTE — PROGRESS NOTES
[facility] - NEUROLOGY 7TH [place] [facility], [place]    Date: 10/23/23  Patient Name: Lore Shrestha   MRN: [id number]   PCP: [facility]  Referring Provider: Self, Aaareferral    Assessment:   Lore Shrestha is a 58 y.o. male presenting hypertension, hyperlipidemia, diabetes, HIV, syphillis s/p treatment, s/p cubital tunnel release, and recent hospital discharge 08/2023 for suspected TIA who presents with right hand/arm pain, right leg pain, and tingling in the right arm and right leg. His presentation could be related to cervical cord/spine localization, chronic pattern of stroke, or diabetic neuropathy. Suspect diabetes less likely due to unilateral presentation, though it is still a possibility and will check an A1c. Likewise, stroke is not as likely given chronic findings on MRI. History of cubital tunnel release could explain the symptoms in the right arm localizing to the ulnar nerve. However, his concurrent symptoms in the right arm and leg in the setting of HIV and syphillis could be an infectious/neoplastic/degenerative process that could localize to the cervical cord.     Plan:     - Order A1c and CD4 count  - MRI cervical spine w/wo contrast  - Recommend continuation of elavil and gabapentin   - Educated the patient about the plan and addressed all questions accordingly  - F/u in 2 months        Problem List Items Addressed This Visit    None      Francesco Coulter MD    Patient note was created using MModal Dictation.  Any errors in syntax or even information may not have been identified and edited on initial review prior to signing this note.  Subjective:   Patient seen in consultation at the request of Self, Aaareferral for the evaluation of right arm pain/tingling and right leg pain/tingling. A copy of this note will be sent to the referring physician.        HPI:     Mr. Lore Shrestha is a 58 y.o. male with history of hypertension,  hyperlipidemia, diabetes, HIV, syphillis s/p treatment, s/p cubital tunnel release, and recent hospital discharge 08/2023 for suspected TIA who presents with right hand/arm pain, right leg pain, and tingling in the right arm and right leg. His symptoms started 08/2023 which prompted him to visit the ED. There was concern for stroke but MRI did not show acute evidence of stroke. There were chronic areas of small vessel infarcts in the L caudate, R basal ganglia, and R thalamus. There was associated R P2 stenosis without large vessel occlusion. He was discharged with DAPT. His symptoms still persist without improvement. He describes pain and tingling in his right elbow that radiates down his forearm and tingling on the dorsum of his right hand and finger tips. The pain and tingling in his right leg starts at the middle of his thigh and radiates to his knee. At worst the pain is a 9/10 in severity. Often salonpas will provide mild relief but other OTC and prescription medications have not provided substantial relief. He takes gabapentin scheduled and elavil prn which do help but he will often feel drowsy with gabapentin. His is not sure when his last A1c and CD4 count were collected. He reports adherence to all his medications and there are no further changes to his regimen. He denies fevers, chills, chest pain, headache, sob, dizziness, or weakness.             PAST MEDICAL HISTORY:  Past Medical History:   Diagnosis Date    Diabetes mellitus     HIV (human immunodeficiency virus infection)     Hypertension        PAST SURGICAL HISTORY:  Past Surgical History:   Procedure Laterality Date    NO PAST SURGERIES  03/11/2019       CURRENT MEDS:  Current Outpatient Medications   Medication Sig Dispense Refill    amitriptyline (ELAVIL) 25 MG tablet Take 25 mg by mouth nightly as needed.      amLODIPine (NORVASC) 5 MG tablet Take 5 mg by mouth.      aspirin (ECOTRIN) 81 MG EC tablet Take 1 tablet (81 mg total) by mouth once  daily. 90 tablet 3    atorvastatin (LIPITOR) 80 MG tablet Take 80 mg by mouth once daily.      clopidogreL (PLAVIX) 75 mg tablet Take 1 tablet (75 mg total) by mouth once daily. 90 tablet 3    dolutegravir-rilpivirine (JULUCA) 50-25 mg Tab Take 1 tablet by mouth once daily. 30 tablet 3    FLUoxetine 40 MG capsule Take 40 mg by mouth once daily.      gabapentin (NEURONTIN) 300 MG capsule Take 300 mg by mouth 3 (three) times daily.      hydroCHLOROthiazide (HYDRODIURIL) 25 MG tablet Take 1 tablet (25 mg total) by mouth 2 (two) times daily. 60 tablet 0    LANTUS SOLOSTAR U-100 INSULIN glargine 100 units/mL SubQ pen Inject 10 Units into the skin every evening.  0    losartan (COZAAR) 100 MG tablet Take 100 mg by mouth once daily.       No current facility-administered medications for this visit.       ALLERGIES:  Review of patient's allergies indicates:  No Known Allergies    FAMILY HISTORY:  No family history on file.    SOCIAL HISTORY:  Social History     Tobacco Use    Smoking status: Never    Smokeless tobacco: Never   Substance Use Topics    Alcohol use: No    Drug use: No       Review of Systems:  12 system review of systems is negative except for the symptoms mentioned in HPI.      Objective:     Vitals:    10/23/23 1316   BP: 137/87   Pulse: 81     General: NAD, well nourished   Eyes: no tearing, discharge, no erythema   ENT: moist mucous membranes of the oral cavity, nares patent    Neck: Supple, full range of motion  Cardiovascular: Warm and well perfused, pulses equal and symmetrical  Lungs: Normal work of breathing, normal chest wall excursions  Skin: No rash, lesions, or breakdown on exposed skin  Psychiatry: Mood and affect are appropriate   Abdomen: soft, non tender, non distended  Extremeties: No cyanosis, clubbing or edema.    Neurological   MENTAL STATUS: Alert and oriented to person, place, and time. Attention and concentration within normal limits. Speech without dysarthria, able to name and repeat  without difficulty. Recent and remote memory within normal limits   CRANIAL NERVES: Visual fields intact. PERRL. EOMI. Facial sensation intact. Face symmetrical. Hearing grossly intact. Full shoulder shrug bilaterally. Tongue protrudes midline   SENSORY: Sensation is intact to light touch throughout.  Joint position perception intact. Negative Romberg. Decreased vibration in the right upper and lower extremity.  MOTOR: Normal bulk and tone. No pronator drift.  5/5 deltoid, biceps, triceps, interosseous, hand  bilaterally. 5/5 iliopsoas, knee extension/flexion, foot dorsi/plantarflexion bilaterally.    REFLEXES: Symmetric and 2+ throughout with exception of right upper/lower extremity 1+. Toes down going bilaterally.   CEREBELLAR/COORDINATION/GAIT: Gait steady with normal arm swing and stride length.  Heel to shin intact. Finger to nose intact. Normal rapid alternating movements.

## 2023-10-24 LAB
CD3+CD4+ CELLS # BLD: 927 CELLS/UL (ref 300–1400)
CD3+CD4+ CELLS NFR BLD: 36 % (ref 28–57)

## 2023-11-07 DIAGNOSIS — G62.9 NEUROPATHY: Primary | ICD-10-CM

## 2023-11-08 ENCOUNTER — HOSPITAL ENCOUNTER (OUTPATIENT)
Dept: RADIOLOGY | Facility: HOSPITAL | Age: 58
Discharge: HOME | End: 2023-11-08
Payer: COMMERCIAL

## 2023-11-08 DIAGNOSIS — R20.2 NUMBNESS AND TINGLING OF RIGHT ARM AND LEG: ICD-10-CM

## 2023-11-08 DIAGNOSIS — R20.0 NUMBNESS AND TINGLING OF RIGHT ARM AND LEG: ICD-10-CM

## 2023-11-08 PROCEDURE — 72156 MRI NECK SPINE W/O & W/DYE: CPT | Mod: 26,,, | Performed by: RADIOLOGY

## 2023-11-08 PROCEDURE — 72156 MRI CERVICAL SPINE W WO CONTRAST: ICD-10-PCS | Mod: 26,,, | Performed by: RADIOLOGY

## 2023-11-08 PROCEDURE — 25500020 PHARM REV CODE 255

## 2023-11-08 PROCEDURE — A9585 GADOBUTROL INJECTION: HCPCS

## 2023-11-08 PROCEDURE — 72156 MRI NECK SPINE W/O & W/DYE: CPT | Mod: TC

## 2023-11-08 RX ORDER — GADOBUTROL 604.72 MG/ML
9 INJECTION INTRAVENOUS
Status: COMPLETED | OUTPATIENT
Start: 2023-11-08 | End: 2023-11-08

## 2023-11-08 RX ADMIN — GADOBUTROL 9 ML: 604.72 INJECTION INTRAVENOUS at 03:11

## 2023-11-20 PROBLEM — I63.9 CVA (CEREBRAL VASCULAR ACCIDENT): Status: RESOLVED | Noted: 2019-03-11 | Resolved: 2023-11-20

## 2024-01-25 DIAGNOSIS — B35.1 DERMATOPHYTOSIS OF NAIL: Primary | ICD-10-CM

## 2024-03-04 ENCOUNTER — HOSPITAL ENCOUNTER (OUTPATIENT)
Dept: RADIOLOGY | Facility: HOSPITAL | Age: 59
Discharge: HOME | End: 2024-03-04
Attending: PODIATRIST
Payer: COMMERCIAL

## 2024-03-04 DIAGNOSIS — B35.1 DERMATOPHYTOSIS OF NAIL: ICD-10-CM

## 2024-03-04 PROCEDURE — 73620 X-RAY EXAM OF FOOT: CPT | Mod: TC,50,FY

## 2024-03-04 PROCEDURE — 73620 X-RAY EXAM OF FOOT: CPT | Mod: 26,,, | Performed by: RADIOLOGY

## 2024-04-17 NOTE — PROGRESS NOTES
Physical Therapy Daily Treatment Note     Name: Lore Shrestha  Clinic Number: [id number]    Therapy Diagnosis:   Encounter Diagnoses   Name Primary?    Muscle weakness of lower extremity     Impaired functional mobility, balance, gait, and endurance      Physician: Patricia Boyle,Cornel    Visit Date: 4/5/2019    Physician Orders: PT Eval and Treat   Medical Diagnosis from Referral: L sided numbness  Evaluation Date: 3/25/2019  Authorization Period Expiration: 12/31/19  Plan of Care Expiration: 5/25/19  Visit # / Visits authorized: 3/ 20       Time In: 1025  Time Out: 1120  Total Billable Time: 35 minutes    Precautions: Diabetes and HIV      Subjective     Pt reports: that he is okay today.  Pt states that he is still having some back pain.   He was compliant with home exercise program.  Response to previous treatment: good   Functional change: none to report     Pain: 7/10  Location: bilateral back      Objective     Fady received therapeutic exercises to develop strength, endurance, ROM, flexibility, posture and core stabilization for 40 minutes including:    Nustep x 8'  [place] level 3     Standing Hip abduction 3 x 10 x 3#  + Mini Squats 2 x 10   Step ups 6'' step x 20 ea LE   LTR x 2'   HL hip abduction Green TB x 20   Bridges 2 x 10   SLR 2 x 10 ea LE   Seated LAQ with ball squeeze 20 x 3'' hold       Fady participated in neuromuscular re-education activities to improve: Balance for 10 minutes. The following activities were included:    + Fwd stepping over small berry x 20 ea LE   + Vertical HT NBOS on Airex  2 x 1'   + Horizontal HT NBOS on Airex 2 x 1'     Fady participated in dynamic functional therapeutic activities to improve functional performance for 5  minutes, including:      Sit <> stands 2 x 10 on 3rd plyo box       Fady received hot pack for 10 minutes to low back .          Home Exercises Provided and Patient Education Provided     Education provided:   - compliance with  HEP    Written Home Exercises Provided: Patient instructed to cont prior HEP.  Exercises were reviewed and Fady was able to demonstrate them prior to the end of the session.  Fady demonstrated good  understanding of the education provided.     See EMR under Patient Instructions for exercises provided prior visit.    Assessment       Mr Shrestha tolerated treatment session well today.  Patient with good tolerance to progression of strengthening exercises with appropriate training effect achieved.  Patient was challenged with dynamic balance activities with 1 LOB observed which patient was able to self correct.  Plan to continue progression of exercises as tolerated by the patient.     Fady is progressing well towards his goals.   Pt prognosis is Good.     Pt will continue to benefit from skilled outpatient physical therapy to address the deficits listed in the problem list box on initial evaluation, provide pt/family education and to maximize pt's level of independence in the home and community environment.     Pt's spiritual, cultural and educational needs considered and pt agreeable to plan of care and goals.     Anticipated barriers to physical therapy: none    GOALS: Short Term Goals: 4 weeks  1. Pt will increase 30 sec sit to stand score to 10 to indicate improved functional strength.  2. Pt will be able to tolerate multi-directional LE strengthening in order to improve ability to perform household chores.  3. Pt will report 50% improvement in ability to walk long distances since start of care to indicate improved functional mobility.   4. Pt will hold single leg stance on L for 10 seconds to indicate improved static balance.   5. Pt to tolerate HEP to improve ROM and independence with ADL's     Long Term Goals: 8 weeks  1. Pt will increase DGI score to 22/24 to indicate improved static and dynamic balance.   2. Pt will be able to perform 2 x 10 multi-directional LE strengthening without fatigue in order to  improve ability to perform household chores.  3. Pt will report 80% improvement in ability to walk long distances since start of care to indicate improved functional mobility.   4. Pt will hold single leg stance on L for 15 seconds to indicate improved static balance.   5. Pt to be Independent with HEP to improve ROM and independence with ADL's      Plan     Continue with current POC.     Laurie Culver, PTA    Photo Preface (Leave Blank If You Do Not Want): Photographs were obtained today Detail Level: Simple

## 2025-05-09 DIAGNOSIS — R13.10 DYSPHAGIA, UNSPECIFIED: Primary | ICD-10-CM

## 2025-05-12 ENCOUNTER — TELEPHONE (OUTPATIENT)
Dept: GASTROENTEROLOGY | Facility: CLINIC | Age: 60
End: 2025-05-12
Payer: COMMERCIAL

## 2025-05-12 NOTE — TELEPHONE ENCOUNTER
MA returned call/spoke with patient. All questions answered in regards to his upcoming appointment.

## 2025-05-12 NOTE — TELEPHONE ENCOUNTER
----- Message from Tech Dorene sent at 5/12/2025  9:27 AM CDT -----  Regarding: Patient call back  .Type: Patient Call BackWho called:self What is the request in detail:asking for a call pack in regards to referral for the appointment on tomorrow. Caller would like to verify from the office that the referral was received for his appointment on 05/13/2025  just to double check/ease his mind. Can the clinic reply by [facility]?no Would the patient rather a call back or a response via [facility]? [facility] call back number:.[phone] Information:

## 2025-05-13 ENCOUNTER — OFFICE VISIT (OUTPATIENT)
Dept: GASTROENTEROLOGY | Facility: CLINIC | Age: 60
End: 2025-05-13
Payer: COMMERCIAL

## 2025-05-13 VITALS
WEIGHT: 201.25 LBS | HEART RATE: 82 BPM | HEIGHT: 71 IN | DIASTOLIC BLOOD PRESSURE: 87 MMHG | SYSTOLIC BLOOD PRESSURE: 153 MMHG | BODY MASS INDEX: 28.18 KG/M2

## 2025-05-13 DIAGNOSIS — R15.9 INCONTINENCE OF FECES, UNSPECIFIED FECAL INCONTINENCE TYPE: ICD-10-CM

## 2025-05-13 DIAGNOSIS — R13.19 ESOPHAGEAL DYSPHAGIA: ICD-10-CM

## 2025-05-13 DIAGNOSIS — R19.7 DIARRHEA, UNSPECIFIED TYPE: ICD-10-CM

## 2025-05-13 DIAGNOSIS — K21.9 GASTROESOPHAGEAL REFLUX DISEASE, UNSPECIFIED WHETHER ESOPHAGITIS PRESENT: Primary | ICD-10-CM

## 2025-05-13 PROCEDURE — 3008F BODY MASS INDEX DOCD: CPT | Mod: CPTII,S$GLB,,

## 2025-05-13 PROCEDURE — 3077F SYST BP >= 140 MM HG: CPT | Mod: CPTII,S$GLB,,

## 2025-05-13 PROCEDURE — 99204 OFFICE O/P NEW MOD 45 MIN: CPT | Mod: S$GLB,,,

## 2025-05-13 PROCEDURE — 4010F ACE/ARB THERAPY RXD/TAKEN: CPT | Mod: CPTII,S$GLB,,

## 2025-05-13 PROCEDURE — 99999 PR PBB SHADOW E&M-EST. PATIENT-LVL IV: CPT | Mod: PBBFAC,,,

## 2025-05-13 PROCEDURE — 3079F DIAST BP 80-89 MM HG: CPT | Mod: CPTII,S$GLB,,

## 2025-05-13 PROCEDURE — 1159F MED LIST DOCD IN RCRD: CPT | Mod: CPTII,S$GLB,,

## 2025-05-13 NOTE — PATIENT INSTRUCTIONS
Ordered upper endoscopy and colonoscopy. Schedulers will call you  Complete stool tests    Fecal Incontinence:  Avoid caffeine.  Keep perianal skin clean and dry. Use premoistened wipe and/or water. Avoid excessive wiping.   Apply a barrier cream to the perianal skin, such as Zinc Oxide.   Incontinence pads can be used to protect both skin and clothing.    Take daily fiber supplement    Acid Reflux Tips  - Stay upright for at least 3 hours after eating  - Raise the head of the bed 4 to 6 inches    - Decrease excess weight    - Avoid citrus juices, acidic foods, alcohol, chocolate, caffeinated and carbonated beverages, fatty and fried foods   - Avoid tight-fitting clothing  - Avoid cigarettes and other tobacco products  - Take antacids (E.g. Tums, Rolaids, Maalox, Mylanta) for breakthrough symptoms

## 2025-05-13 NOTE — PROGRESS NOTES
"    [facility] Consultation Note    Reason for Consult:  The primary encounter diagnosis was Gastroesophageal reflux disease, unspecified whether esophagitis present. Diagnoses of Dysphagia, unspecified, Incontinence of feces, unspecified fecal incontinence type, and Diarrhea, unspecified type were also pertinent to this visit.    PCP:   [facility]   No address on file    Referring MD:  Self, Aaareferral  No address on file    HPI:  This is a 59 y.o. male with PMHx of stroke x2 (on blood thinners), DM, HIV, and HTN here for evaluation of dysphagia to solids that lasted all of March 2025. Pt reports he had difficulty swallowing meats and vegetables, but was able to tolerate yogurts. This gradually resolved. He now has no difficulty swallowing, though he reports constant dry mouth and throat. He does endorse reflux about every 2-3 days. Takes pepto as needed with relief. Pt denies abdominal pain, regurgitation, bloating, belching, early satiety, or N/V. Denies frequent NSAID use. Denies tobacco and alcohol use.     Pt also reports his PCP told him there was something "concerning" about his throat noted on an imaging study and suggested he get this checked out "asap". Pt does not recall what type of imaging, when it was done, where it was done, etc.     Pt reports intermittent watery/loose fecal incontinence and 3-4 soft, formed BM daily for the past 2 months (also began in March). He used to have 1-2 regular BM daily. With the incontinence, he doesn't realize he is passing stool until he feels it in his underwear.     Pt reports colonoscopy in 2021 with no polyps and recommended repeat in 5 years. He denies any FHx of colon cancer. Denies constipation, bloody stools, rectal bleeding, or melena.      Objective Findings:    Vital Signs:  BP (!) 153/87   Pulse 82   Ht 5' 11" (1.803 m)   Wt 91.3 kg (201 lb 4.5 oz)   BMI 28.07 kg/m²   Body mass index is 28.07 " kg/m².    Physical Exam:  General Appearance: Well appearing in no acute distress  Abdomen: Soft, non tender, non distended in all four quadrants. No hepatosplenomegaly, ascites, or mass.    Assessment:  1. Gastroesophageal reflux disease, unspecified whether esophagitis present    2. Dysphagia, unspecified    3. Incontinence of feces, unspecified fecal incontinence type    4. Diarrhea, unspecified type      This is a 59 y.o M here for evaluation of dysphagia, now resolved, GERD, and change in bowel habits with fecal incontinence over the past 2 months.      - Ordered dual scopes  - Ordered stool tests  - Advised high fiber diet and daily fiber supplement  - Discussed GERD dietary and lifestyle changes with patient  - Consider PPI after scope    Order summary:  Orders Placed This Encounter    Stool culture    Calprotectin, Stool    Giardia / Cryptosporidum, EIA    Rotavirus antigen, stool    Pancreatic elastase, fecal     Thank you so much for allowing me to participate in the care of Lore Q Martin Sarah Abukhader, PA-C [facility]

## 2025-05-14 ENCOUNTER — TELEPHONE (OUTPATIENT)
Dept: GASTROENTEROLOGY | Facility: CLINIC | Age: 60
End: 2025-05-14
Payer: COMMERCIAL

## 2025-05-14 DIAGNOSIS — R19.7 DIARRHEA, UNSPECIFIED TYPE: Primary | ICD-10-CM

## 2025-05-14 DIAGNOSIS — R13.10 DYSPHAGIA, UNSPECIFIED TYPE: ICD-10-CM

## 2025-05-14 NOTE — TELEPHONE ENCOUNTER
MA returned patient's call. All questions answered to Mr. Bessdionna's satisfaction about submitting stool specimen.

## 2025-05-14 NOTE — TELEPHONE ENCOUNTER
----- Message from EMBI sent at 5/14/2025  2:41 PM CDT -----  Who called:selfWhat is the request in detail: has some questions regarding the lab requisition Can the clinic reply by [facility]?noWould the patient rather a call back or a response via [facility]:.[phone] Information:

## 2025-05-15 ENCOUNTER — TELEPHONE (OUTPATIENT)
Dept: ENDOSCOPY | Facility: HOSPITAL | Age: 60
End: 2025-05-15

## 2025-05-15 ENCOUNTER — CLINICAL SUPPORT (OUTPATIENT)
Dept: ENDOSCOPY | Facility: HOSPITAL | Age: 60
End: 2025-05-15
Payer: COMMERCIAL

## 2025-05-15 VITALS — HEIGHT: 71 IN | BODY MASS INDEX: 28.14 KG/M2 | WEIGHT: 201 LBS

## 2025-05-15 DIAGNOSIS — Z12.11 SPECIAL SCREENING FOR MALIGNANT NEOPLASMS, COLON: ICD-10-CM

## 2025-05-15 DIAGNOSIS — R19.7 DIARRHEA, UNSPECIFIED TYPE: Primary | ICD-10-CM

## 2025-05-15 DIAGNOSIS — R13.10 DYSPHAGIA, UNSPECIFIED TYPE: ICD-10-CM

## 2025-05-15 DIAGNOSIS — R19.7 DIARRHEA, UNSPECIFIED TYPE: ICD-10-CM

## 2025-05-15 RX ORDER — POLYETHYLENE GLYCOL 3350, SODIUM SULFATE ANHYDROUS, SODIUM BICARBONATE, SODIUM CHLORIDE, POTASSIUM CHLORIDE 236; 22.74; 6.74; 5.86; 2.97 G/4L; G/4L; G/4L; G/4L; G/4L
4 POWDER, FOR SOLUTION ORAL ONCE
Qty: 4000 ML | Refills: 0 | Status: SHIPPED | OUTPATIENT
Start: 2025-05-15 | End: 2025-05-15

## 2025-05-15 NOTE — TELEPHONE ENCOUNTER
"Patient is scheduled for a Colonoscopy/EGD on 6/25/25 with Dr. FABIANA Simon  Referral for procedure from [facility]          The patient is currently under an external PCP, Lucia Ji NP ,  care. Is patient okay to hold blood thinner Plavix (clopidogrel) for their upcoming scheduled Colonoscopy/EGD on 6/25/25.       External provider information:    Physician name: Lucia Ji NP   Facility/Location: [place] [address]    Phone number: [phone]          ----- Message -----  From: Elisha Jane PA-C  Sent: 5/13/2025   3:10 PM CDT  To: [facility] Endoscopy Schedulers  Subject: EGD/Colonoscopy                                  Procedure: EGD/Colonoscopy    Diagnosis: Diarrhea and Dysphagia    Procedure Timing: Within 12 weeks    *If within 4 weeks selected, please joshua as high priority*    *If greater than 12 weeks, please select "5-12 weeks" and delay sending until 3 months prior to requested date*    [place] [place] [facility] [facility]    Additional Scheduling Information: Blood thinners    Prep Specifications:Standard prep    Is the patient taking a GLP-1 Agonist:no    Have you attached a patient to this message: yes           " No change

## 2025-05-15 NOTE — TELEPHONE ENCOUNTER
Contacted the patient to schedule an endoscopy procedure(s) Colonoscopy/EGD . The patient did not answer the call and left a voice message requesting a call back.   New PAT appt made

## 2025-05-16 ENCOUNTER — LAB VISIT (OUTPATIENT)
Dept: LAB | Facility: HOSPITAL | Age: 60
End: 2025-05-16
Payer: COMMERCIAL

## 2025-05-16 DIAGNOSIS — R15.9 INCONTINENCE OF FECES, UNSPECIFIED FECAL INCONTINENCE TYPE: ICD-10-CM

## 2025-05-16 DIAGNOSIS — R19.7 DIARRHEA, UNSPECIFIED TYPE: ICD-10-CM

## 2025-05-16 LAB — RV AG STL QL IA.RAPID: NEGATIVE

## 2025-05-16 PROCEDURE — 87046 STOOL CULTR AEROBIC BACT EA: CPT

## 2025-05-16 PROCEDURE — 82653 EL-1 FECAL QUANTITATIVE: CPT

## 2025-05-16 PROCEDURE — 87425 ROTAVIRUS AG IA: CPT

## 2025-05-16 PROCEDURE — 87328 CRYPTOSPORIDIUM AG IA: CPT

## 2025-05-16 PROCEDURE — 87427 SHIGA-LIKE TOXIN AG IA: CPT | Mod: 59

## 2025-05-18 LAB
E COLI SXT1 STL QL IA: NEGATIVE
E COLI SXT2 STL QL IA: NEGATIVE

## 2025-05-19 ENCOUNTER — TELEPHONE (OUTPATIENT)
Dept: ENDOSCOPY | Facility: HOSPITAL | Age: 60
End: 2025-05-19
Payer: COMMERCIAL

## 2025-05-19 LAB
BACTERIA STL CULT: NORMAL
CALPROTECTIN INTERP (OHS): NORMAL
CALPROTECTIN STOOL (OHS): 22.1 ΜG/G
CRYPTOSP AG SPEC QL: NEGATIVE
ELASTASE, STOOL INTERPRETATION (OHS): ABNORMAL
G LAMBLIA AG STL QL IA: NEGATIVE
PANCREATIC ELASTASE, FECAL (OHS): 112 ΜG/G

## 2025-05-19 NOTE — TELEPHONE ENCOUNTER
----- Message from LEANDRO Shelton sent at 5/15/2025 12:34 PM CDT -----  Regardin/25 BT  The patient is currently under an external PCP, Lucia Ji NP ,  care. Is patient okay to hold blood thinner Plavix (clopidogrel) for their upcoming scheduled Colonoscopy/EGD on 25. External provider information:  Physician name: Lucia Ji NP Facility/Location: [address] [address]Odvuq number: [phone]

## 2025-05-19 NOTE — TELEPHONE ENCOUNTER
Department of Endoscopy      Dear NATHANIEL Ji NP,    Your patient, Lore Shrestha 1965 is being scheduled for EGD/Colonoscopy and our records indicate they are taking Plavix (clopidogrel).    Please indicate if and when the patient can safely stop their medication prior to the procedure by completing one of the following:    Do not discontinue medication: _____    Medication can be discontinued _____ days prior to the procedure.    Please take into consideration that therapeutic maneuvers may be performed during the procedure that requires delay in restarting anticoagulation therapy.      Signature: ______________________________________________ Date:__________________    Please sign and date this letter and return fax to : [phone] If you have any questions or concerns, please call [phone] Monday-Friday, 8:00 am-3:00 pm.     Thank you for your prompt response,    [facility]       Medication                                                                Hold Time                                                                                                                                                        ANTIPLATELETS   Persantine (dipyridamole) 2 days   Aggrenox (ASA/dipyridamole) 2 days     Pletal (cilostazol) 2 days     Plavix (clopidogrel) 5 days     Effient (prasugrel) 5 days     Ticlid (ticlidopine) 10 days     Brilinta (ticagrelor) 3 days     Zontivity (vorapaxar) 5 days   ANTICOAGULANTS   Coumadin (warfarin) 5 days   Lovenox (enoxaparin)  -last dose administered to be 50% of total daily dose 24 hours   Fragmin (dalteparin)   -last dose administered to be 50% of total daily dose 24 hours   Arixtra (fondaparinux) 2 days     Xarelto (rivaroxaban)   2 days     Eliquis (apixaban)   2 days     Savaysa (edoxaban)   2 days     Pradaxa (dabigatran)   2 days     Iprivask (desirudin)   10 hrs

## 2025-05-23 NOTE — TELEPHONE ENCOUNTER
Patient has been approved to hold Plavix (clopidogrel) for 5 days per M Garrison NP-approval scanned into chart.

## 2025-06-05 ENCOUNTER — TELEPHONE (OUTPATIENT)
Dept: GASTROENTEROLOGY | Facility: CLINIC | Age: 60
End: 2025-06-05
Payer: COMMERCIAL

## 2025-06-24 ENCOUNTER — ANESTHESIA EVENT (OUTPATIENT)
Dept: ENDOSCOPY | Facility: HOSPITAL | Age: 60
End: 2025-06-24
Payer: COMMERCIAL

## 2025-06-25 ENCOUNTER — ANESTHESIA (OUTPATIENT)
Dept: ENDOSCOPY | Facility: HOSPITAL | Age: 60
End: 2025-06-25
Payer: COMMERCIAL

## 2025-06-25 ENCOUNTER — HOSPITAL ENCOUNTER (OUTPATIENT)
Facility: HOSPITAL | Age: 60
Discharge: HOME | End: 2025-06-25
Attending: STUDENT IN AN ORGANIZED HEALTH CARE EDUCATION/TRAINING PROGRAM | Admitting: STUDENT IN AN ORGANIZED HEALTH CARE EDUCATION/TRAINING PROGRAM
Payer: COMMERCIAL

## 2025-06-25 VITALS
TEMPERATURE: 97 F | SYSTOLIC BLOOD PRESSURE: 134 MMHG | DIASTOLIC BLOOD PRESSURE: 75 MMHG | HEART RATE: 80 BPM | RESPIRATION RATE: 20 BRPM | OXYGEN SATURATION: 95 %

## 2025-06-25 DIAGNOSIS — R13.10 DYSPHAGIA, UNSPECIFIED TYPE: ICD-10-CM

## 2025-06-25 DIAGNOSIS — K21.9 GERD (GASTROESOPHAGEAL REFLUX DISEASE): ICD-10-CM

## 2025-06-25 DIAGNOSIS — R19.7 DIARRHEA, UNSPECIFIED TYPE: ICD-10-CM

## 2025-06-25 LAB — POCT GLUCOSE: 99 MG/DL (ref 70–110)

## 2025-06-25 PROCEDURE — 37000009 HC ANESTHESIA EA ADD 15 MINS: Performed by: STUDENT IN AN ORGANIZED HEALTH CARE EDUCATION/TRAINING PROGRAM

## 2025-06-25 PROCEDURE — 45380 COLONOSCOPY AND BIOPSY: CPT | Mod: XS,,, | Performed by: STUDENT IN AN ORGANIZED HEALTH CARE EDUCATION/TRAINING PROGRAM

## 2025-06-25 PROCEDURE — 37000008 HC ANESTHESIA 1ST 15 MINUTES: Performed by: STUDENT IN AN ORGANIZED HEALTH CARE EDUCATION/TRAINING PROGRAM

## 2025-06-25 PROCEDURE — 45385 COLONOSCOPY W/LESION REMOVAL: CPT | Performed by: STUDENT IN AN ORGANIZED HEALTH CARE EDUCATION/TRAINING PROGRAM

## 2025-06-25 PROCEDURE — 82962 GLUCOSE BLOOD TEST: CPT | Performed by: STUDENT IN AN ORGANIZED HEALTH CARE EDUCATION/TRAINING PROGRAM

## 2025-06-25 PROCEDURE — 27201089 HC SNARE, DISP (ANY): Performed by: STUDENT IN AN ORGANIZED HEALTH CARE EDUCATION/TRAINING PROGRAM

## 2025-06-25 PROCEDURE — 88305 TISSUE EXAM BY PATHOLOGIST: CPT | Mod: 26,,, | Performed by: PATHOLOGY

## 2025-06-25 PROCEDURE — 45380 COLONOSCOPY AND BIOPSY: CPT | Mod: XS | Performed by: STUDENT IN AN ORGANIZED HEALTH CARE EDUCATION/TRAINING PROGRAM

## 2025-06-25 PROCEDURE — 43239 EGD BIOPSY SINGLE/MULTIPLE: CPT | Mod: 51,,, | Performed by: STUDENT IN AN ORGANIZED HEALTH CARE EDUCATION/TRAINING PROGRAM

## 2025-06-25 PROCEDURE — 45385 COLONOSCOPY W/LESION REMOVAL: CPT | Mod: ,,, | Performed by: STUDENT IN AN ORGANIZED HEALTH CARE EDUCATION/TRAINING PROGRAM

## 2025-06-25 PROCEDURE — 63600175 PHARM REV CODE 636 W HCPCS

## 2025-06-25 PROCEDURE — 88305 TISSUE EXAM BY PATHOLOGIST: CPT | Mod: TC,91 | Performed by: STUDENT IN AN ORGANIZED HEALTH CARE EDUCATION/TRAINING PROGRAM

## 2025-06-25 PROCEDURE — 27201012 HC FORCEPS, HOT/COLD, DISP: Performed by: STUDENT IN AN ORGANIZED HEALTH CARE EDUCATION/TRAINING PROGRAM

## 2025-06-25 PROCEDURE — 43239 EGD BIOPSY SINGLE/MULTIPLE: CPT | Performed by: STUDENT IN AN ORGANIZED HEALTH CARE EDUCATION/TRAINING PROGRAM

## 2025-06-25 PROCEDURE — 25000003 PHARM REV CODE 250

## 2025-06-25 RX ORDER — OMEPRAZOLE 40 MG/1
40 CAPSULE, DELAYED RELEASE ORAL DAILY
Qty: 90 CAPSULE | Refills: 3 | Status: SHIPPED | OUTPATIENT
Start: 2025-06-25

## 2025-06-25 RX ORDER — LIDOCAINE HYDROCHLORIDE 20 MG/ML
INJECTION INTRAVENOUS
Status: DISCONTINUED | OUTPATIENT
Start: 2025-06-25 | End: 2025-06-25

## 2025-06-25 RX ORDER — DEXMEDETOMIDINE HYDROCHLORIDE 100 UG/ML
INJECTION, SOLUTION INTRAVENOUS
Status: DISCONTINUED | OUTPATIENT
Start: 2025-06-25 | End: 2025-06-25

## 2025-06-25 RX ORDER — LIDOCAINE HYDROCHLORIDE 10 MG/ML
1 INJECTION, SOLUTION EPIDURAL; INFILTRATION; INTRACAUDAL; PERINEURAL ONCE
Status: DISCONTINUED | OUTPATIENT
Start: 2025-06-25 | End: 2025-06-25 | Stop reason: HOSPADM

## 2025-06-25 RX ORDER — DEXTROMETHORPHAN/PSEUDOEPHED 2.5-7.5/.8
DROPS ORAL
Status: DISCONTINUED
Start: 2025-06-25 | End: 2025-06-25 | Stop reason: HOSPADM

## 2025-06-25 RX ORDER — PROPOFOL 10 MG/ML
VIAL (ML) INTRAVENOUS
Status: DISCONTINUED | OUTPATIENT
Start: 2025-06-25 | End: 2025-06-25

## 2025-06-25 RX ORDER — SODIUM CHLORIDE 9 MG/ML
INJECTION, SOLUTION INTRAVENOUS CONTINUOUS
Status: DISCONTINUED | OUTPATIENT
Start: 2025-06-25 | End: 2025-06-25 | Stop reason: HOSPADM

## 2025-06-25 RX ORDER — LIDOCAINE HYDROCHLORIDE 20 MG/ML
INJECTION, SOLUTION EPIDURAL; INFILTRATION; INTRACAUDAL; PERINEURAL
Status: DISCONTINUED
Start: 2025-06-25 | End: 2025-06-25 | Stop reason: HOSPADM

## 2025-06-25 RX ORDER — PROPOFOL 10 MG/ML
VIAL (ML) INTRAVENOUS
Status: DISCONTINUED
Start: 2025-06-25 | End: 2025-06-25 | Stop reason: HOSPADM

## 2025-06-25 RX ADMIN — PROPOFOL 20 MG: 10 INJECTION, EMULSION INTRAVENOUS at 09:06

## 2025-06-25 RX ADMIN — PROPOFOL 40 MG: 10 INJECTION, EMULSION INTRAVENOUS at 09:06

## 2025-06-25 RX ADMIN — LIDOCAINE HYDROCHLORIDE 140 MG: 20 INJECTION, SOLUTION INTRAVENOUS at 09:06

## 2025-06-25 RX ADMIN — PROPOFOL 30 MG: 10 INJECTION, EMULSION INTRAVENOUS at 09:06

## 2025-06-25 RX ADMIN — PROPOFOL 10 MG: 10 INJECTION, EMULSION INTRAVENOUS at 09:06

## 2025-06-25 RX ADMIN — DEXMEDETOMIDINE HYDROCHLORIDE 8 MCG: 100 INJECTION, SOLUTION INTRAVENOUS at 09:06

## 2025-06-25 RX ADMIN — PROPOFOL 70 MG: 10 INJECTION, EMULSION INTRAVENOUS at 09:06

## 2025-06-25 RX ADMIN — PROPOFOL 50 MG: 10 INJECTION, EMULSION INTRAVENOUS at 09:06

## 2025-06-25 RX ADMIN — SODIUM CHLORIDE: 0.9 INJECTION, SOLUTION INTRAVENOUS at 09:06

## 2025-06-25 NOTE — PLAN OF CARE
Procedure and recovery complete. Patient awake and alert. MD at bedside, procedure findings and suggestions discussed. Discharge instructions given, patient verbalized understanding of instructions. Pt discharged via wheelchair accompanied by staff and co-worker.

## 2025-06-25 NOTE — ANESTHESIA POSTPROCEDURE EVALUATION
Anesthesia Post Evaluation    Patient: Lore Shrestha    Procedure(s) Performed: Procedure(s) (LRB):  EGD (ESOPHAGOGASTRODUODENOSCOPY) (N/A)  COLONOSCOPY (N/A)    Final Anesthesia Type: general      Patient location during evaluation: GI PACU  Patient participation: Yes- Able to Participate  Level of consciousness: awake and alert and oriented  Post-procedure vital signs: reviewed and stable  Pain management: adequate  Airway patency: patent    PONV status at discharge: No PONV  Anesthetic complications: no      Cardiovascular status: blood pressure returned to baseline and hemodynamically stable  Respiratory status: unassisted, spontaneous ventilation and room air  Hydration status: euvolemic  Follow-up not needed.              Vitals Value Taken Time   /75 06/25/25 10:19   Temp 36.3 °C (97.4 °F) 06/25/25 09:49   Pulse 80 06/25/25 10:19   Resp 20 06/25/25 10:19   SpO2 95 % 06/25/25 10:19         Event Time   Out of Recovery 10:45:00         Pain/Wang Score: Wang Score: 10 (6/25/2025 10:19 AM)

## 2025-06-25 NOTE — TRANSFER OF CARE
Anesthesia Transfer of Care Note    Patient: Lore Shrestha    Procedure(s) Performed: Procedure(s) (LRB):  EGD (ESOPHAGOGASTRODUODENOSCOPY) (N/A)  COLONOSCOPY (N/A)    Patient location: GI    Anesthesia Type: general    Transport from OR: Transported from OR on room air with adequate spontaneous ventilation    Post pain: adequate analgesia    Post assessment: no apparent anesthetic complications and tolerated procedure well    Post vital signs: stable    Level of consciousness: responds to stimulation and lethargic    Nausea/Vomiting: no nausea/vomiting    Complications: none    Transfer of care protocol was followed      Last vitals: Visit Vitals  BP (!) 113/57 (BP Location: Left arm, Patient Position: Lying)   Pulse 81   Temp 36.3 °C (97.4 °F) (Oral)   Resp 18   SpO2 (!) 94%

## 2025-06-25 NOTE — H&P
Endoscopy History and Physical    [facility]    Procedure - EGD/colonoscopy  ASA - per anesthesia  Mallampati - per anesthesia  Plan of anesthesia - MAC    HPI:  This is a 60 y.o. male here for evaluation of : dysphagia, GERD, diarrhea    ROS:  Constitutional: No fevers, chills  CV: No chest pain  Pulm: No cough  Ophtho: No vision changes  GI: see HPI  Derm: No rash    Medical History:  has a past medical history of Diabetes mellitus, HIV (human immunodeficiency virus infection), and Hypertension.    Surgical History:  has a past surgical history that includes No past surgeries (03/11/2019).    Family History: family history is not on file.. Otherwise no colon cancer, inflammatory bowel disease, or GI malignancies.    Social History:  reports that he has never smoked. He has never used smokeless tobacco. He reports that he does not drink alcohol and does not use drugs.    Review of patient's allergies indicates:  No Known Allergies    Medications:   Prescriptions Prior to Admission[1]      Vital Signs:   Vitals:    06/25/25 0740   BP:    Pulse: 81   Resp:    Temp:        General Appearance: Well appearing in no acute distress  Eyes:    No scleral icterus  ENT: atraumatic  Abdomen: Soft, nondistended  Extremities: no tenderness  Skin: normal color    Labs:  Lab Results   Component Value Date    WBC 8.07 08/15/2023    HGB 13.0 (L) 08/15/2023    HCT 39.8 (L) 08/15/2023     08/15/2023    CHOL 249 (H) 08/16/2023    TRIG 126 08/16/2023    HDL 33 (L) 08/16/2023    ALT 17 08/02/2024    AST 19 08/02/2024     (L) 08/02/2024    K 4.0 08/02/2024     11/08/2023    CREATININE 1.28 08/02/2024    BUN 11.0 08/02/2024    CO2 25 08/02/2024    TSH 0.611 03/11/2019    INR 1.0 08/17/2023    HGBA1C 12.1 (H) 10/23/2023       I have explained the risks and benefits of endoscopy procedures to the patient/their POA including but not limited to bleeding, perforation, infection, and  death.  The patient/POA was asked if they understand and allowed to ask any further questions to their satisfaction.    Janis Simon MD         [1]   Medications Prior to Admission   Medication Sig Dispense Refill Last Dose/Taking    amitriptyline (ELAVIL) 25 MG tablet Take 25 mg by mouth nightly as needed.   6/24/2025    amLODIPine (NORVASC) 5 MG tablet Take 5 mg by mouth.   6/24/2025    atorvastatin (LIPITOR) 80 MG tablet Take 80 mg by mouth once daily.   6/24/2025    FLUoxetine 40 MG capsule Take 40 mg by mouth once daily.   6/24/2025    losartan (COZAAR) 100 MG tablet Take 100 mg by mouth once daily.   6/24/2025    aspirin (ECOTRIN) 81 MG EC tablet Take 1 tablet (81 mg total) by mouth once daily. 90 tablet 3     clopidogreL (PLAVIX) 75 mg tablet Take 1 tablet (75 mg total) by mouth once daily. 90 tablet 3 6/19/2025    dolutegravir-rilpivirine (JULUCA) 50-25 mg Tab Take 1 tablet by mouth once daily. 30 tablet 3     gabapentin (NEURONTIN) 300 MG capsule Take 300 mg by mouth 3 (three) times daily.       hydroCHLOROthiazide (HYDRODIURIL) 25 MG tablet Take 1 tablet (25 mg total) by mouth 2 (two) times daily. 60 tablet 0     LANTUS SOLOSTAR U-100 INSULIN glargine 100 units/mL SubQ pen Inject 10 Units into the skin every evening.  0

## 2025-06-25 NOTE — [employer] PATIENT INSTRUCTIONS
Discharge Summary/Instructions after an Endoscopic Procedure  Patient Name: Lore Shrestha  Patient MRN: [id number]  Patient YOB: 1965 Wednesday, June 25, 2025  Janis Simon MD  Dear patient,  As a result of recent federal legislation (The Federal Cures Act), you may   receive lab or pathology results from your procedure in your [facility]   account before your physician is able to contact you. Your physician or   their representative will relay the results to you with their   recommendations at their soonest availability.  Thank you,  RESTRICTIONS:  During your procedure today, you received medications for sedation.  These   medications may affect your judgment, balance and coordination.  Therefore,   for 24 hours, you have the following restrictions:   - DO NOT drive a car, operate machinery, make legal/financial decisions,   sign important papers or drink alcohol.    ACTIVITY:  Today: no heavy lifting, straining or running due to procedural   sedation/anesthesia.  The following day: return to full activity including work.  DIET:  Eat and drink normally unless instructed otherwise.     TREATMENT FOR COMMON SIDE EFFECTS:  - Mild abdominal pain, nausea, belching, bloating or excessive gas:  rest,   eat lightly and use a heating pad.  - Sore Throat: treat with throat lozenges and/or gargle with warm salt   water.  - Because air was used during the procedure, expelling large amounts of air   from your rectum or belching is normal.  - If a bowel prep was taken, you may not have a bowel movement for 1-3 days.    This is normal.  SYMPTOMS TO WATCH FOR AND REPORT TO YOUR PHYSICIAN:  1. Abdominal pain or bloating, other than gas cramps.  2. Chest pain.  3. Back pain.  4. Signs of infection such as: chills or fever occurring within 24 hours   after the procedure.  5. Rectal bleeding, which would show as bright red, maroon, or black stools.   (A tablespoon of blood from the rectum is not serious, especially if    hemorrhoids are present.)  6. Vomiting.  7. Weakness or dizziness.  GO DIRECTLY TO THE NEAREST EMERGENCY ROOM IF YOU HAVE ANY OF THE FOLLOWING:      Difficulty breathing              Chills and/or fever over 101 F   Persistent vomiting and/or vomiting blood   Severe abdominal pain   Severe chest pain   Black, tarry stools   Bleeding- more than one tablespoon   Any other symptom or condition that you feel may need urgent attention  Your doctor recommends these additional instructions:  If any biopsies were taken, your doctors clinic will contact you in 1 to 2   weeks with any results.  - Patient has a contact number available for emergencies.  The signs and   symptoms of potential delayed complications were discussed with the   patient.  Return to normal activities tomorrow.  Written discharge   instructions were provided to the patient.   - Discharge patient to home.   - Resume previous diet.   - Continue present medications.   - Await pathology results.   - Perform a colonoscopy today.   - Use a proton pump inhibitor PO daily. Dysphagia may be from reflux.  For questions, problems or results please call your physician - Janis Simon MD at Work:  [phone].  [facility] can be reached at [phone]     IF A COMPLICATION OR EMERGENCY SITUATION ARISES AND YOU ARE UNABLE TO REACH   YOUR PHYSICIAN - GO DIRECTLY TO THE EMERGENCY ROOM.  MD Janis Valente MD  6/25/2025 9:52:12 AM  This report has been verified and signed electronically.  Dear patient,  As a result of recent federal legislation (The Federal Cures Act), you may   receive lab or pathology results from your procedure in your [facility]   account before your physician is able to contact you. Your physician or   their representative will relay the results to you with their   recommendations at their soonest availability.  Thank you,  [employer]OVATION

## 2025-06-25 NOTE — [employer]OVATION PATIENT INSTRUCTIONS
Discharge Summary/Instructions after an Endoscopic Procedure  Patient Name: Lore Shrestha  Patient MRN: [id number]  Patient YOB: 1965 Wednesday, June 25, 2025  Janis Simon MD  Dear patient,  As a result of recent federal legislation (The Federal Cures Act), you may   receive lab or pathology results from your procedure in your [facility]   account before your physician is able to contact you. Your physician or   their representative will relay the results to you with their   recommendations at their soonest availability.  Thank you,  RESTRICTIONS:  During your procedure today, you received medications for sedation.  These   medications may affect your judgment, balance and coordination.  Therefore,   for 24 hours, you have the following restrictions:   - DO NOT drive a car, operate machinery, make legal/financial decisions,   sign important papers or drink alcohol.    ACTIVITY:  Today: no heavy lifting, straining or running due to procedural   sedation/anesthesia.  The following day: return to full activity including work.  DIET:  Eat and drink normally unless instructed otherwise.     TREATMENT FOR COMMON SIDE EFFECTS:  - Mild abdominal pain, nausea, belching, bloating or excessive gas:  rest,   eat lightly and use a heating pad.  - Sore Throat: treat with throat lozenges and/or gargle with warm salt   water.  - Because air was used during the procedure, expelling large amounts of air   from your rectum or belching is normal.  - If a bowel prep was taken, you may not have a bowel movement for 1-3 days.    This is normal.  SYMPTOMS TO WATCH FOR AND REPORT TO YOUR PHYSICIAN:  1. Abdominal pain or bloating, other than gas cramps.  2. Chest pain.  3. Back pain.  4. Signs of infection such as: chills or fever occurring within 24 hours   after the procedure.  5. Rectal bleeding, which would show as bright red, maroon, or black stools.   (A tablespoon of blood from the rectum is not serious, especially if    hemorrhoids are present.)  6. Vomiting.  7. Weakness or dizziness.  GO DIRECTLY TO THE NEAREST EMERGENCY ROOM IF YOU HAVE ANY OF THE FOLLOWING:      Difficulty breathing              Chills and/or fever over 101 F   Persistent vomiting and/or vomiting blood   Severe abdominal pain   Severe chest pain   Black, tarry stools   Bleeding- more than one tablespoon   Any other symptom or condition that you feel may need urgent attention  Your doctor recommends these additional instructions:  If any biopsies were taken, your doctors clinic will contact you in 1 to 2   weeks with any results.  - Patient has a contact number available for emergencies.  The signs and   symptoms of potential delayed complications were discussed with the   patient.  Return to normal activities tomorrow.  Written discharge   instructions were provided to the patient.   - Discharge patient to home.   - Resume previous diet.   - Continue present medications.   - Await pathology results.   - Repeat colonoscopy in 7 years for surveillance.   - Resume Plavix (clopidogrel) at prior dose tomorrow.  For questions, problems or results please call your physician - Janis Simon MD at Work:  [phone].  [facility] can be reached at [phone]     IF A COMPLICATION OR EMERGENCY SITUATION ARISES AND YOU ARE UNABLE TO REACH   YOUR PHYSICIAN - GO DIRECTLY TO THE EMERGENCY ROOM.  MD Janis Valente MD  6/25/2025 10:00:27 AM  This report has been verified and signed electronically.  Dear patient,  As a result of recent federal legislation (The Federal Cures Act), you may   receive lab or pathology results from your procedure in your [facility]   account before your physician is able to contact you. Your physician or   their representative will relay the results to you with their   recommendations at their soonest availability.  Thank you,  PROVATION

## 2025-06-26 ENCOUNTER — RESULTS FOLLOW-UP (OUTPATIENT)
Dept: GASTROENTEROLOGY | Facility: HOSPITAL | Age: 60
End: 2025-06-26

## 2025-06-26 LAB
ESTROGEN SERPL-MCNC: NORMAL PG/ML
INSULIN SERPL-ACNC: NORMAL U[IU]/ML
LAB AP CLINICAL INFORMATION: NORMAL
LAB AP GROSS DESCRIPTION: NORMAL
LAB AP PERFORMING LOCATION(S): NORMAL
LAB AP REPORT FOOTNOTES: NORMAL